# Patient Record
Sex: FEMALE | Race: WHITE | Employment: FULL TIME | URBAN - METROPOLITAN AREA
[De-identification: names, ages, dates, MRNs, and addresses within clinical notes are randomized per-mention and may not be internally consistent; named-entity substitution may affect disease eponyms.]

---

## 2016-06-01 LAB
ANTIBODY SCREEN OB: NEGATIVE
HEMATOCRIT: 42.1
HEMOGLOBIN: 13.9
HEPATITIS B SURFACE ANTIGEN OB: NEGATIVE
PLATELET COUNT: 379
RAPID PLASMA REAGIN OB: NONREACTIVE
RH FACTOR OB: POSITIVE
URINE CULTURE: NEGATIVE

## 2016-08-06 LAB — Lab: NEGATIVE

## 2016-10-18 LAB — Lab: 107

## 2016-12-23 LAB — STREP GP B CULT OB: NEGATIVE

## 2017-01-12 ENCOUNTER — HOSPITAL ENCOUNTER (OUTPATIENT)
Facility: HOSPITAL | Age: 32
Setting detail: OBSERVATION
Discharge: HOME | End: 2017-01-12
Attending: OBSTETRICS & GYNECOLOGY | Admitting: OBSTETRICS & GYNECOLOGY
Payer: COMMERCIAL

## 2017-01-12 PROCEDURE — 59025 FETAL NON-STRESS TEST: CPT

## 2017-01-12 PROCEDURE — 99213 OFFICE O/P EST LOW 20 MIN: CPT

## 2017-01-12 PROCEDURE — 99212 OFFICE O/P EST SF 10 MIN: CPT

## 2017-01-12 RX ORDER — FAMOTIDINE 10 MG
10 TABLET ORAL 2 TIMES DAILY
COMMUNITY
End: 2017-04-18 | Stop reason: ALTCHOICE

## 2017-01-13 VITALS — DIASTOLIC BLOOD PRESSURE: 86 MMHG | SYSTOLIC BLOOD PRESSURE: 127 MMHG | HEART RATE: 86 BPM

## 2017-01-13 NOTE — TRIAGE
[facility]      Triage Note    Rigo Jeffrey Patient Status:  Observation    10/14/1985 MRN [id number]   Location [facility] Attending Elvira Alfaro MD   Hosp Day # 0 PCP Areli Allen.  MD Ashley Bush   Est

## 2017-01-15 ENCOUNTER — ANESTHESIA EVENT (OUTPATIENT)
Dept: OBGYN UNIT | Facility: HOSPITAL | Age: 32
End: 2017-01-15

## 2017-01-15 ENCOUNTER — HOSPITAL ENCOUNTER (INPATIENT)
Facility: HOSPITAL | Age: 32
LOS: 2 days | Discharge: HOME | End: 2017-01-17
Attending: OBSTETRICS & GYNECOLOGY | Admitting: OBSTETRICS & GYNECOLOGY
Payer: COMMERCIAL

## 2017-01-15 ENCOUNTER — ANESTHESIA (OUTPATIENT)
Dept: OBGYN UNIT | Facility: HOSPITAL | Age: 32
End: 2017-01-15

## 2017-01-15 PROBLEM — Z34.90 PREGNANCY: Status: ACTIVE | Noted: 2017-01-15

## 2017-01-15 PROBLEM — Z34.90 PREGNANCY (HCC): Status: ACTIVE | Noted: 2017-01-15

## 2017-01-15 LAB
BILIRUB UR QL: NEGATIVE
COLOR UR: YELLOW
ERYTHROCYTE [DISTWIDTH] IN BLOOD BY AUTOMATED COUNT: 13.1 % (ref 11–15)
GLUCOSE UR-MCNC: NEGATIVE MG/DL
HCT VFR BLD AUTO: 37.8 % (ref 35–48)
HGB BLD-MCNC: 12.9 G/DL (ref 12–16)
KETONES UR-MCNC: NEGATIVE MG/DL
LEUKOCYTE ESTERASE UR QL STRIP.AUTO: NEGATIVE
MCH RBC QN AUTO: 30.3 PG (ref 27–32)
MCHC RBC AUTO-ENTMCNC: 34 G/DL (ref 32–37)
MCV RBC AUTO: 89 FL (ref 80–100)
NITRITE UR QL STRIP.AUTO: NEGATIVE
PH UR: 7 [PH] (ref 5–8)
PLATELET # BLD AUTO: 254 K/UL (ref 140–400)
PMV BLD AUTO: 8.7 FL (ref 7.4–10.3)
PROT UR-MCNC: NEGATIVE MG/DL
RBC # BLD AUTO: 4.25 M/UL (ref 3.7–5.4)
RBC #/AREA URNS AUTO: 1 /HPF
RH BLOOD TYPE: POSITIVE
SP GR UR STRIP: 1.01 (ref 1–1.03)
UROBILINOGEN UR STRIP-ACNC: <2
VIT C UR-MCNC: NEGATIVE MG/DL
WBC # BLD AUTO: 7.7 K/UL (ref 4–11)
WBC #/AREA URNS AUTO: 2 /HPF

## 2017-01-15 PROCEDURE — 51702 INSERT TEMP BLADDER CATH: CPT

## 2017-01-15 PROCEDURE — 85027 COMPLETE CBC AUTOMATED: CPT | Performed by: OBSTETRICS & GYNECOLOGY

## 2017-01-15 PROCEDURE — 86901 BLOOD TYPING SEROLOGIC RH(D): CPT | Performed by: OBSTETRICS & GYNECOLOGY

## 2017-01-15 PROCEDURE — 86900 BLOOD TYPING SEROLOGIC ABO: CPT | Performed by: OBSTETRICS & GYNECOLOGY

## 2017-01-15 PROCEDURE — 0KQM0ZZ REPAIR PERINEUM MUSCLE, OPEN APPROACH: ICD-10-PCS | Performed by: OBSTETRICS & GYNECOLOGY

## 2017-01-15 PROCEDURE — 81001 URINALYSIS AUTO W/SCOPE: CPT | Performed by: OBSTETRICS & GYNECOLOGY

## 2017-01-15 PROCEDURE — 36415 COLL VENOUS BLD VENIPUNCTURE: CPT

## 2017-01-15 PROCEDURE — 86780 TREPONEMA PALLIDUM: CPT | Performed by: OBSTETRICS & GYNECOLOGY

## 2017-01-15 RX ORDER — NALBUPHINE HCL 10 MG/ML
2.5 AMPUL (ML) INJECTION
Status: DISCONTINUED | OUTPATIENT
Start: 2017-01-15 | End: 2017-01-17

## 2017-01-15 RX ORDER — SODIUM CHLORIDE, SODIUM LACTATE, POTASSIUM CHLORIDE, CALCIUM CHLORIDE 600; 310; 30; 20 MG/100ML; MG/100ML; MG/100ML; MG/100ML
INJECTION, SOLUTION INTRAVENOUS
Status: COMPLETED
Start: 2017-01-15 | End: 2017-01-15

## 2017-01-15 RX ORDER — MORPHINE SULFATE 4 MG/ML
4 INJECTION, SOLUTION INTRAMUSCULAR; INTRAVENOUS EVERY 10 MIN PRN
Status: DISCONTINUED | OUTPATIENT
Start: 2017-01-15 | End: 2017-01-17

## 2017-01-15 RX ORDER — LIDOCAINE HYDROCHLORIDE 10 MG/ML
INJECTION, SOLUTION EPIDURAL; INFILTRATION; INTRACAUDAL; PERINEURAL AS NEEDED
Status: DISCONTINUED | OUTPATIENT
Start: 2017-01-15 | End: 2018-09-10 | Stop reason: SURG

## 2017-01-15 RX ORDER — TERBUTALINE SULFATE 1 MG/ML
0.25 INJECTION, SOLUTION SUBCUTANEOUS AS NEEDED
Status: DISCONTINUED | OUTPATIENT
Start: 2017-01-15 | End: 2017-01-15

## 2017-01-15 RX ORDER — SODIUM CHLORIDE 0.9 % (FLUSH) 0.9 %
10 SYRINGE (ML) INJECTION AS NEEDED
Status: DISCONTINUED | OUTPATIENT
Start: 2017-01-15 | End: 2017-01-17

## 2017-01-15 RX ORDER — MORPHINE SULFATE 10 MG/ML
6 INJECTION, SOLUTION INTRAMUSCULAR; INTRAVENOUS EVERY 10 MIN PRN
Status: DISCONTINUED | OUTPATIENT
Start: 2017-01-15 | End: 2017-01-17

## 2017-01-15 RX ORDER — ONDANSETRON 2 MG/ML
4 INJECTION INTRAMUSCULAR; INTRAVENOUS ONCE AS NEEDED
Status: ACTIVE | OUTPATIENT
Start: 2017-01-15 | End: 2017-01-15

## 2017-01-15 RX ORDER — SODIUM CHLORIDE, SODIUM LACTATE, POTASSIUM CHLORIDE, CALCIUM CHLORIDE 600; 310; 30; 20 MG/100ML; MG/100ML; MG/100ML; MG/100ML
INJECTION, SOLUTION INTRAVENOUS
Status: DISPENSED
Start: 2017-01-15 | End: 2017-01-15

## 2017-01-15 RX ORDER — AMMONIA INHALANTS 0.04 G/.3ML
0.3 INHALANT RESPIRATORY (INHALATION) AS NEEDED
Status: DISCONTINUED | OUTPATIENT
Start: 2017-01-15 | End: 2017-01-15

## 2017-01-15 RX ORDER — ONDANSETRON 2 MG/ML
4 INJECTION INTRAMUSCULAR; INTRAVENOUS EVERY 6 HOURS PRN
Status: DISCONTINUED | OUTPATIENT
Start: 2017-01-15 | End: 2017-01-17

## 2017-01-15 RX ORDER — ONDANSETRON 2 MG/ML
4 INJECTION INTRAMUSCULAR; INTRAVENOUS EVERY 6 HOURS PRN
Status: DISCONTINUED | OUTPATIENT
Start: 2017-01-15 | End: 2017-01-15

## 2017-01-15 RX ORDER — LIDOCAINE HYDROCHLORIDE AND EPINEPHRINE 15; 5 MG/ML; UG/ML
INJECTION, SOLUTION EPIDURAL AS NEEDED
Status: DISCONTINUED | OUTPATIENT
Start: 2017-01-15 | End: 2018-09-10 | Stop reason: SURG

## 2017-01-15 RX ORDER — HYDROMORPHONE HYDROCHLORIDE 1 MG/ML
0.2 INJECTION, SOLUTION INTRAMUSCULAR; INTRAVENOUS; SUBCUTANEOUS EVERY 5 MIN PRN
Status: DISCONTINUED | OUTPATIENT
Start: 2017-01-15 | End: 2017-01-17

## 2017-01-15 RX ORDER — MORPHINE SULFATE 2 MG/ML
2 INJECTION, SOLUTION INTRAMUSCULAR; INTRAVENOUS EVERY 10 MIN PRN
Status: DISCONTINUED | OUTPATIENT
Start: 2017-01-15 | End: 2017-01-17

## 2017-01-15 RX ORDER — ACETAMINOPHEN 325 MG/1
650 TABLET ORAL EVERY 4 HOURS PRN
Status: DISCONTINUED | OUTPATIENT
Start: 2017-01-15 | End: 2017-01-17

## 2017-01-15 RX ORDER — HYDROCODONE BITARTRATE AND ACETAMINOPHEN 5; 325 MG/1; MG/1
1 TABLET ORAL EVERY 4 HOURS PRN
Status: DISCONTINUED | OUTPATIENT
Start: 2017-01-15 | End: 2017-01-17

## 2017-01-15 RX ORDER — NALOXONE HYDROCHLORIDE 0.4 MG/ML
80 INJECTION, SOLUTION INTRAMUSCULAR; INTRAVENOUS; SUBCUTANEOUS AS NEEDED
Status: ACTIVE | OUTPATIENT
Start: 2017-01-15 | End: 2017-01-15

## 2017-01-15 RX ORDER — DOCUSATE SODIUM 100 MG/1
100 CAPSULE, LIQUID FILLED ORAL
Status: DISCONTINUED | OUTPATIENT
Start: 2017-01-15 | End: 2017-01-17

## 2017-01-15 RX ORDER — FAMOTIDINE 20 MG/1
10 TABLET ORAL 2 TIMES DAILY
Status: DISCONTINUED | OUTPATIENT
Start: 2017-01-15 | End: 2017-01-17

## 2017-01-15 RX ORDER — EPHEDRINE SULFATE 50 MG/ML
5 INJECTION, SOLUTION INTRAVENOUS AS NEEDED
Status: DISCONTINUED | OUTPATIENT
Start: 2017-01-15 | End: 2017-01-15

## 2017-01-15 RX ORDER — DEXTROSE, SODIUM CHLORIDE, SODIUM LACTATE, POTASSIUM CHLORIDE, AND CALCIUM CHLORIDE 5; .6; .31; .03; .02 G/100ML; G/100ML; G/100ML; G/100ML; G/100ML
125 INJECTION, SOLUTION INTRAVENOUS CONTINUOUS
Status: DISCONTINUED | OUTPATIENT
Start: 2017-01-15 | End: 2017-01-15

## 2017-01-15 RX ORDER — SIMETHICONE 80 MG
80 TABLET,CHEWABLE ORAL 3 TIMES DAILY PRN
Status: DISCONTINUED | OUTPATIENT
Start: 2017-01-15 | End: 2017-01-17

## 2017-01-15 RX ORDER — AMMONIA INHALANTS 0.04 G/.3ML
0.3 INHALANT RESPIRATORY (INHALATION) AS NEEDED
Status: DISCONTINUED | OUTPATIENT
Start: 2017-01-15 | End: 2017-01-17

## 2017-01-15 RX ORDER — HYDROMORPHONE HYDROCHLORIDE 1 MG/ML
0.6 INJECTION, SOLUTION INTRAMUSCULAR; INTRAVENOUS; SUBCUTANEOUS EVERY 5 MIN PRN
Status: DISCONTINUED | OUTPATIENT
Start: 2017-01-15 | End: 2017-01-17

## 2017-01-15 RX ORDER — IBUPROFEN 600 MG/1
600 TABLET ORAL EVERY 6 HOURS
Status: DISCONTINUED | OUTPATIENT
Start: 2017-01-15 | End: 2017-01-17

## 2017-01-15 RX ORDER — IBUPROFEN 600 MG/1
600 TABLET ORAL ONCE AS NEEDED
Status: DISCONTINUED | OUTPATIENT
Start: 2017-01-15 | End: 2017-01-15

## 2017-01-15 RX ORDER — HALOPERIDOL 5 MG/ML
0.25 INJECTION INTRAMUSCULAR ONCE AS NEEDED
Status: ACTIVE | OUTPATIENT
Start: 2017-01-15 | End: 2017-01-15

## 2017-01-15 RX ORDER — HYDROCODONE BITARTRATE AND ACETAMINOPHEN 5; 325 MG/1; MG/1
2 TABLET ORAL EVERY 4 HOURS PRN
Status: DISCONTINUED | OUTPATIENT
Start: 2017-01-15 | End: 2017-01-17

## 2017-01-15 RX ORDER — BUPIVACAINE HYDROCHLORIDE 2.5 MG/ML
INJECTION, SOLUTION EPIDURAL; INFILTRATION; INTRACAUDAL
Status: DISCONTINUED
Start: 2017-01-15 | End: 2017-01-15 | Stop reason: WASHOUT

## 2017-01-15 RX ORDER — HYDROCODONE BITARTRATE AND ACETAMINOPHEN 5; 325 MG/1; MG/1
1 TABLET ORAL AS NEEDED
Status: DISCONTINUED | OUTPATIENT
Start: 2017-01-15 | End: 2017-01-17

## 2017-01-15 RX ORDER — LIDOCAINE HYDROCHLORIDE 10 MG/ML
30 INJECTION, SOLUTION EPIDURAL; INFILTRATION; INTRACAUDAL; PERINEURAL ONCE
Status: DISCONTINUED | OUTPATIENT
Start: 2017-01-15 | End: 2017-01-15

## 2017-01-15 RX ORDER — HYDROMORPHONE HYDROCHLORIDE 1 MG/ML
0.4 INJECTION, SOLUTION INTRAMUSCULAR; INTRAVENOUS; SUBCUTANEOUS EVERY 5 MIN PRN
Status: DISCONTINUED | OUTPATIENT
Start: 2017-01-15 | End: 2017-01-17

## 2017-01-15 RX ORDER — HYDROCODONE BITARTRATE AND ACETAMINOPHEN 5; 325 MG/1; MG/1
2 TABLET ORAL AS NEEDED
Status: DISCONTINUED | OUTPATIENT
Start: 2017-01-15 | End: 2017-01-17

## 2017-01-15 RX ORDER — BISACODYL 10 MG
10 SUPPOSITORY, RECTAL RECTAL ONCE AS NEEDED
Status: ACTIVE | OUTPATIENT
Start: 2017-01-15 | End: 2017-01-15

## 2017-01-15 RX ORDER — SODIUM CHLORIDE 0.9 % (FLUSH) 0.9 %
10 SYRINGE (ML) INJECTION AS NEEDED
Status: DISCONTINUED | OUTPATIENT
Start: 2017-01-15 | End: 2017-01-15

## 2017-01-15 RX ADMIN — LIDOCAINE HYDROCHLORIDE AND EPINEPHRINE 5 ML: 15; 5 INJECTION, SOLUTION EPIDURAL at 03:45:00

## 2017-01-15 RX ADMIN — LIDOCAINE HYDROCHLORIDE 5 ML: 10 INJECTION, SOLUTION EPIDURAL; INFILTRATION; INTRACAUDAL; PERINEURAL at 03:45:00

## 2017-01-15 NOTE — ANESTHESIA PROCEDURE NOTES
Labor Analgesia  Performed by: Marla Saxena by: Felix Jimenez    Start Time:  1/15/2017 3:45 AM  End Time:  1/15/2017 4:02 AM  Site Identification: surface landmarks    Reason for Block: labor epidural    Anesthesiologist:  Felix Jimenez  Performed

## 2017-01-15 NOTE — H&P
[address] Jing Cardenas Patient Status:  Observation    10/14/1985 MRN [id number]   Location [address] C-D Attending Tessa, [address] [place] [address] Day # 0 PCP Roberta Masterson.  Zenobia Lacy MD     Date of Admission admission:  famotidine 10 MG Oral Tab Take 10 mg by mouth 2 (two) times daily. Disp:  Rfl:  1/14/2017 at Unknown time   Prenatal MV-Min-Fe Fum-FA-DHA (PRENATAL+DHA) 28-0.975 & 200 MG Oral Misc Take  by mouth.  Disp:  Rfl:  1/14/2017 at Unknown time     Delfino

## 2017-01-15 NOTE — LACTATION NOTE
This note was copied from the chart of [address] [place] [address].   LACTATION NOTE - INFANT    Evaluation Type  Evaluation Type: Inpatient    Problems & Assessment  Problems Diagnosed or Identified: Sleepy  Infant Assessment: Skin color: pink or appropriate for ethnicity;

## 2017-01-15 NOTE — ANESTHESIA PREPROCEDURE EVALUATION
Anesthesia PreOp Note    HPI:     Michelle August is a 33year old female who presents for preoperative consultation requested by: * No surgeons listed *    Date of Surgery: 1/15/2017    * No procedures listed *  Indication: * No pre-op diagnosis entered * (MARCAINE) 0.25 % injection         fentaNYL 2mcg/ml & bupivacaine 1.25mg/ml 2 mcg/ml-0.125% epidural infusion         lactated ringers infusion         Lidocaine HCl (PF) (XYLOCAINE) 1 % injection Annita Darling MD 5 mL at 01/15/17 0345    lidoca EPHEDrine Sulfate injection 5 mg 5 mg Intravenous PRN Annita Hubbard MD     Nalbuphine HCl (NUBAIN) injection 2.5 mg 2.5 mg Intravenous Q15 Min PRN Annita Hubbard MD     Naloxone HCl (NARCAN) 0.4 MG/ML injection 80 mcg 80 mcg Intravenous PRN Ross Mauricio MD is 79. Her respiration is 16 and oxygen saturation is 97%.     01/15/17  0220 01/15/17  0230 01/15/17  0310   BP: 152/82 154/80 133/79   Pulse: 56 61 79   Temp: 98 °F (36.7 °C)     TempSrc: Oral     Resp: 16     Height: 1.626 m (5' 4\")     Weight: 87.998 k

## 2017-01-15 NOTE — LACTATION NOTE
LACTATION NOTE - MOTHER           Problems identified  Problems identified: Knowledge deficit         Breastfeeding goal  Breastfeeding goal: To maintain breast milk feeding per patient goal    Maternal Assessment  Bilateral Breasts: Soft  Bilateral Nipple

## 2017-01-15 NOTE — L&D DELIVERY NOTE
[facility]    Vaginal Delivery Note    [facility] Patient Status:  Observation    10/14/1985 MRN [id number]   Location [address] C-D Attending Tessa, [address] [place] [address] Day # 0 PCP Leora Luna.  Adelina Paredes MD     Delivery     I

## 2017-01-15 NOTE — DISCHARGE SUMMARY
[facility]    Discharge Summary    Raisa Cam Patient Status:  Observation    10/14/1985 MRN [id number]   Location [address] C-D Attending Tessa, [address] [place] [address] Street Day # 0       Admission Date: 1/15/2017  Discharge

## 2017-01-16 LAB
HCT VFR BLD AUTO: 35.5 % (ref 35–48)
HGB BLD-MCNC: 12.1 G/DL (ref 12–16)
T PALLIDUM AB SER QL: NEGATIVE

## 2017-01-16 PROCEDURE — 85014 HEMATOCRIT: CPT | Performed by: OBSTETRICS & GYNECOLOGY

## 2017-01-16 PROCEDURE — 85018 HEMOGLOBIN: CPT | Performed by: OBSTETRICS & GYNECOLOGY

## 2017-01-16 NOTE — PLAN OF CARE
Problem: POSTPARTUM  Goal: Optimize infant feeding at the breast  INTERVENTIONS:  - Initiate breast feeding within first hour after birth. - Monitor effectiveness of current breast feeding efforts. - Assess support systems available to mother/family.   - beliefs/practices regarding lactation, letdown techniques, maternal food preferences.   - Assess mother’s knowledge and previous experience with breast feeding.  - Provide information as needed about early infant feeding cues (e.g., rooting, lip smacking, s

## 2017-01-16 NOTE — LACTATION NOTE
LACTATION NOTE - MOTHER           Problems identified  Problems identified: Knowledge deficit (hx of plugged ducts)         Breastfeeding goal  Breastfeeding goal: To maintain breast milk feeding per patient goal    Maternal Assessment  Bilateral Breasts:

## 2017-01-16 NOTE — PROGRESS NOTES
[facility]    OB/GYNE Progress Note      Joo Taveras Patient Status:  Inpatient    10/14/1985 MRN [id number]   Location [facility] 3SE Attending Tessa, [address] [place] [address] Day # 1 PCP Arnulfo Amos.  Luba Ken MD       Assessment/Plan

## 2017-01-17 VITALS
WEIGHT: 194 LBS | RESPIRATION RATE: 16 BRPM | HEIGHT: 64 IN | TEMPERATURE: 98 F | OXYGEN SATURATION: 97 % | HEART RATE: 86 BPM | SYSTOLIC BLOOD PRESSURE: 126 MMHG | BODY MASS INDEX: 33.12 KG/M2 | DIASTOLIC BLOOD PRESSURE: 83 MMHG

## 2017-01-17 NOTE — LACTATION NOTE
LACTATION NOTE - MOTHER           Problems identified  Problems identified: Knowledge deficit; Nipple pain  Problems Identified Other: Reports cluster feeding overnight    Maternal history  Other/comment: plugged ducts, engorgement with first    Breastfeedi

## 2017-01-17 NOTE — PLAN OF CARE
BREAST FEEDING    • Optimize infant feeding at the breast Completed        INADEQUATE LATCH, SUCK OR SWALLOW    • Demonstrate ability to latch and sustain latch, audible swallowing and satiety Completed        POSTPARTUM    • Long Term Goal:Experiences nor

## 2017-01-17 NOTE — LACTATION NOTE
This note was copied from the chart of [address] [place] [address].   LACTATION NOTE - INFANT    Evaluation Type  Evaluation Type: Inpatient    Problems & Assessment  Problems Diagnosed or Identified: Tongue restriction  Problems: comment/detail: Visible lingual frenulum an goal: To provide adequate infant nutrition to prevent initial  weight loss of greater than 10% and thereafter to meet or exceed minimum weight gain of 4-7 oz per week  while maintaining breast milk feeding.   Written Education: Outpatient lactation c

## 2017-01-17 NOTE — PROGRESS NOTES
Michelle August  [id number]  January 17, 2017  7:19 AM      Post-Partum Vaginal Delivery    Subjective: Doing well, no complaints    Objective:  Tolerating present diet, pain well controlled, ambulating  /78 mmHg  Pulse 81  Temp(Src) 98.1 °F (36.7 °C) (Oral

## 2017-01-17 NOTE — PLAN OF CARE
Dr Anisha King notified of EPDS 10. Behavior Health notified of score. Requested that RN give Mom line to patient prior to discharge. Patient acting appropriate and given additional resources.

## 2017-01-25 ENCOUNTER — NURSE ONLY (OUTPATIENT)
Dept: LACTATION | Facility: HOSPITAL | Age: 32
End: 2017-01-25
Payer: COMMERCIAL

## 2017-01-25 DIAGNOSIS — O92.79 DISORDER OF LACTATION, POSTPARTUM CONDITION OR COMPLICATION: Primary | ICD-10-CM

## 2017-01-25 PROCEDURE — 99212 OFFICE O/P EST SF 10 MIN: CPT

## 2017-01-25 NOTE — PROGRESS NOTES
Mother here today because of ongoing nipple pain while nursing infant. Infant is gaining weight appropriately. Mom has oversupply of milk.  Upon observation of the feeding, infant unable to hilda upper lip to make a good suction on the breast due to restric positioning to make her back more comfortable with nursing.

## 2017-04-04 ENCOUNTER — TELEPHONE (OUTPATIENT)
Dept: FAMILY MEDICINE CLINIC | Facility: CLINIC | Age: 32
End: 2017-04-04

## 2017-04-04 NOTE — TELEPHONE ENCOUNTER
She's curious if there are any suggestions for energy levels, natural remedies. She's feeling run down after having her child in January and he's sleeping well now so she wants to speak with a nurse.  She's been taking the prenatal vitamin and limiting caff

## 2017-04-04 NOTE — TELEPHONE ENCOUNTER
Pt states she has a 1month old at home, this is her second child, she has a toddler as well. Pt states there are times she feels like she is completley out of energy. Pt is breast feeding, taking prenatal vitamin and limiting caffeine.  Pt is trying to eat

## 2017-04-18 ENCOUNTER — OFFICE VISIT (OUTPATIENT)
Dept: FAMILY MEDICINE CLINIC | Facility: CLINIC | Age: 32
End: 2017-04-18

## 2017-04-18 VITALS
HEIGHT: 65.16 IN | SYSTOLIC BLOOD PRESSURE: 114 MMHG | TEMPERATURE: 98 F | DIASTOLIC BLOOD PRESSURE: 80 MMHG | RESPIRATION RATE: 18 BRPM | WEIGHT: 159 LBS | BODY MASS INDEX: 26.17 KG/M2 | HEART RATE: 87 BPM

## 2017-04-18 DIAGNOSIS — R53.83 OTHER FATIGUE: Primary | ICD-10-CM

## 2017-04-18 PROCEDURE — 99214 OFFICE O/P EST MOD 30 MIN: CPT | Performed by: FAMILY MEDICINE

## 2017-04-18 PROCEDURE — 99212 OFFICE O/P EST SF 10 MIN: CPT | Performed by: FAMILY MEDICINE

## 2017-04-19 NOTE — PROGRESS NOTES
HPI:    Patient ID: Annia Harris is a 33year old female. Malaise  Associated symptoms include fatigue. Pertinent negatives include no fever, headaches, joint swelling, nausea or rash. Nothing aggravates the symptoms. She has tried rest for the symptoms. decrease commitments. If no improvement consider more extensive exam/testing.       Orders Placed This Encounter  BASIC METABOLIC PANEL [04229] [Q]  CBC [6399] [Q]  TSH [899] [Q]    Meds This Visit:  No prescriptions requested or ordered in this encounter

## 2017-04-26 ENCOUNTER — OFFICE VISIT (OUTPATIENT)
Dept: FAMILY MEDICINE CLINIC | Facility: CLINIC | Age: 32
End: 2017-04-26

## 2017-04-26 VITALS
WEIGHT: 157 LBS | DIASTOLIC BLOOD PRESSURE: 78 MMHG | HEIGHT: 65.5 IN | BODY MASS INDEX: 25.84 KG/M2 | RESPIRATION RATE: 16 BRPM | TEMPERATURE: 97 F | SYSTOLIC BLOOD PRESSURE: 113 MMHG | HEART RATE: 80 BPM

## 2017-04-26 DIAGNOSIS — J01.00 ACUTE NON-RECURRENT MAXILLARY SINUSITIS: Primary | ICD-10-CM

## 2017-04-26 PROCEDURE — 99212 OFFICE O/P EST SF 10 MIN: CPT | Performed by: FAMILY MEDICINE

## 2017-04-26 PROCEDURE — 99213 OFFICE O/P EST LOW 20 MIN: CPT | Performed by: FAMILY MEDICINE

## 2017-04-26 RX ORDER — AMOXICILLIN AND CLAVULANATE POTASSIUM 875; 125 MG/1; MG/1
1 TABLET, FILM COATED ORAL 2 TIMES DAILY
Qty: 20 TABLET | Refills: 0 | Status: SHIPPED | OUTPATIENT
Start: 2017-04-26 | End: 2017-05-06

## 2017-04-27 NOTE — PROGRESS NOTES
HPI:    Giovanna Haddad is a 33year old female presents to clinic with a almost  3 week history of symptoms. States that she initially felt like she had a cold over Easter weekend.  Symptoms were severe for about 4-5 days and then they improved but did not r HENT: Positive for congestion and ear pain. Negative for sore throat. Eyes: Negative. Respiratory: Positive for cough. Negative for hemoptysis, sputum production, shortness of breath and wheezing. Cardiovascular: Negative.     Gastrointestinal: N defined types were placed in this encounter. Meds This Visit:    Signed Prescriptions Disp Refills    Amoxicillin-Pot Clavulanate 875-125 MG Oral Tab 20 tablet 0      Sig: Take 1 tablet by mouth 2 (two) times daily.            Imaging & Referrals:  No

## 2017-08-08 ENCOUNTER — OFFICE VISIT (OUTPATIENT)
Dept: FAMILY MEDICINE CLINIC | Facility: CLINIC | Age: 32
End: 2017-08-08

## 2017-08-08 VITALS
SYSTOLIC BLOOD PRESSURE: 116 MMHG | BODY MASS INDEX: 26.34 KG/M2 | HEIGHT: 64.57 IN | WEIGHT: 156.19 LBS | DIASTOLIC BLOOD PRESSURE: 77 MMHG | RESPIRATION RATE: 17 BRPM | TEMPERATURE: 98 F | HEART RATE: 73 BPM

## 2017-08-08 DIAGNOSIS — R20.0 NUMBNESS OF TOES: ICD-10-CM

## 2017-08-08 DIAGNOSIS — D22.9 NEVUS: ICD-10-CM

## 2017-08-08 DIAGNOSIS — E66.3 OVERWEIGHT: ICD-10-CM

## 2017-08-08 DIAGNOSIS — Z01.419 ENCOUNTER FOR GYNECOLOGICAL EXAMINATION WITHOUT ABNORMAL FINDING: Primary | ICD-10-CM

## 2017-08-08 DIAGNOSIS — F41.9 ANXIETY: ICD-10-CM

## 2017-08-08 PROCEDURE — 99395 PREV VISIT EST AGE 18-39: CPT | Performed by: FAMILY MEDICINE

## 2017-08-08 NOTE — PROGRESS NOTES
HPI:    Patient ID: Mayela Mendez is a 33year old female. HPI    Review of Systems   Constitutional: Negative. Respiratory: Negative. Cardiovascular: Negative. Gastrointestinal: Negative. Skin: Negative.     Neurological: Positive for numbness anxiety has been helpful    Overweight–reviewed diet and exercise recommendations    Lactating mother–currently breast-feeding    Nevus–right groin/buttock with 5 mm flat pigmented nevus, discussed monitoring for change    Numbness of toes–patient has inte

## 2017-08-10 LAB — HPV I/H RISK 1 DNA SPEC QL NAA+PROBE: NEGATIVE

## 2017-08-28 ENCOUNTER — NURSE TRIAGE (OUTPATIENT)
Dept: FAMILY MEDICINE CLINIC | Facility: CLINIC | Age: 32
End: 2017-08-28

## 2017-08-28 NOTE — TELEPHONE ENCOUNTER
Action Requested: Summary for Provider     []  Critical Lab, Recommendations Needed  [] Need Additional Advice  []   FYI    []   Need Orders  [] Need Medications Sent to Pharmacy  []  Other     SUMMARY: Carlo Clarke today or tomorrow but pt can only go to OP

## 2017-08-29 ENCOUNTER — OFFICE VISIT (OUTPATIENT)
Dept: FAMILY MEDICINE CLINIC | Facility: CLINIC | Age: 32
End: 2017-08-29

## 2017-08-29 VITALS
SYSTOLIC BLOOD PRESSURE: 114 MMHG | TEMPERATURE: 98 F | DIASTOLIC BLOOD PRESSURE: 77 MMHG | BODY MASS INDEX: 26.25 KG/M2 | RESPIRATION RATE: 16 BRPM | WEIGHT: 155.63 LBS | HEIGHT: 64.57 IN | HEART RATE: 77 BPM
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
## 2017-08-29 PROCEDURE — 99213 OFFICE O/P EST LOW 20 MIN: CPT | Performed by: FAMILY MEDICINE

## 2017-08-29 PROCEDURE — 99212 OFFICE O/P EST SF 10 MIN: CPT | Performed by: FAMILY MEDICINE

## 2017-08-29 RX ORDER — FLUTICASONE PROPIONATE 50 MCG
SPRAY, SUSPENSION (ML) NASAL DAILY
COMMUNITY
End: 2019-12-18

## 2017-08-29 RX ORDER — CHOLECALCIFEROL (VITAMIN D3) 50 MCG
TABLET ORAL
COMMUNITY
End: 2019-02-20

## 2017-08-29 NOTE — PROGRESS NOTES
HPI:    Patient ID: Jennifer Davis is a 33year old female. Rash   This is a new problem. The current episode started yesterday. The problem has been waxing and waning since onset. The affected locations include the left lower leg and right lower leg.  The

## 2017-08-29 NOTE — TELEPHONE ENCOUNTER
Spoke with patient and relayed Dr. Olga Lidia Foley message below-patient verbalizes understanding and agreement. Patient added on to schedule tonight.

## 2017-09-13 ENCOUNTER — NURSE TRIAGE (OUTPATIENT)
Dept: OTHER | Age: 32
End: 2017-09-13

## 2017-09-13 NOTE — TELEPHONE ENCOUNTER
Action Requested: Summary for Provider     []  Critical Lab, Recommendations Needed  [] Need Additional Advice  [x]   FYI    []   Need Orders  [] Need Medications Sent to Pharmacy  []  Other     SUMMARY:Home Care instructions provided.     Pt states that sh

## 2017-09-14 ENCOUNTER — TELEPHONE (OUTPATIENT)
Dept: FAMILY MEDICINE CLINIC | Facility: CLINIC | Age: 32
End: 2017-09-14

## 2017-09-14 NOTE — TELEPHONE ENCOUNTER
Pt states she was seen in at an  for an infection of her eye lid.  Pt was given prescription for Erythromycin eye ointment, pharmacist was not able to give pt information regarding safety of med while breast feeding and pt only found information about ora

## 2017-09-14 NOTE — TELEPHONE ENCOUNTER
Pt called back and stated that she feels her eye is more swollen and the eye is tearing. Pt was offered a appt but she can not make it in until after 5 pm. Pt was advised that she needs to be seen today.  Pt was advised to go to a walk in clinic or IC as sh

## 2017-09-14 NOTE — TELEPHONE ENCOUNTER
Pt returned call, informed her of Dr. Claudia Solis message.  She has no further questions or concerns

## 2017-09-14 NOTE — TELEPHONE ENCOUNTER
Pt calling and stts she is breast feeding and was given the med below to use on her eye. Pt wants to make sure it would be safe?   Please advise    Medication Quantity Refills Start End   triamcinolone acetonide 0.1 % External Cream 15 g 0 8/29/2017 8/29/20

## 2017-09-15 NOTE — TELEPHONE ENCOUNTER
Pt returning call, states she went to  in [facility], was prescribed pt eye ointment and was told her symptoms should improve in a couple days. Advised pt to call back if symptoms worsen. Patient verbalized understanding and had no further questions.

## 2017-10-20 ENCOUNTER — TELEPHONE (OUTPATIENT)
Dept: OTHER | Age: 32
End: 2017-10-20

## 2017-10-20 NOTE — TELEPHONE ENCOUNTER
LM for patient with information on Get out of your Mind and into your Life by Vaishnavi Cano, PhD.  Also contact information for Dr. Adis Polk.

## 2017-10-20 NOTE — TELEPHONE ENCOUNTER
Patient calling and stated she was diagnosed with Anxiety a couple of years ago. He family does have stressful situations. She stated she if fine now but would like to keep her anxiety under control.   She is asking if you know of any books she can read

## 2017-11-02 ENCOUNTER — NURSE TRIAGE (OUTPATIENT)
Dept: OTHER | Age: 32
End: 2017-11-02

## 2017-11-02 RX ORDER — CODEINE PHOSPHATE AND GUAIFENESIN 10; 100 MG/5ML; MG/5ML
5 SOLUTION ORAL EVERY 6 HOURS PRN
Qty: 180 ML | Refills: 0 | Status: SHIPPED | OUTPATIENT
Start: 2017-11-02 | End: 2017-11-16

## 2017-11-02 NOTE — TELEPHONE ENCOUNTER
Please reply to pool: EM RN TRIAGE      Action Requested: Summary for Provider     []  Critical Lab, Recommendations Needed  [x] Need Additional Advice  []   FYI    [x]   Need Orders  [x] Need Medications Sent to Pharmacy  []  Other     SUMMARY: Requesting

## 2017-11-02 NOTE — TELEPHONE ENCOUNTER
Left message for patient–will send Cheratussin to pharmacy. However agree with recommendation for follow-up visit, no additional antibiotic without evaluation.

## 2017-11-02 NOTE — TELEPHONE ENCOUNTER
Dr German Connors, Patient calling to ask if she can take Cheratussin if she is breast feeding her 10 month old, LMP about two weeks ago in October

## 2017-11-03 NOTE — TELEPHONE ENCOUNTER
Please let her know it is okay to use after infant is 3months of age. Occasionally may cause sedation and infant, but not dangerous levels.

## 2017-11-16 ENCOUNTER — OFFICE VISIT (OUTPATIENT)
Dept: FAMILY MEDICINE CLINIC | Facility: CLINIC | Age: 32
End: 2017-11-16

## 2017-11-16 ENCOUNTER — TELEPHONE (OUTPATIENT)
Dept: OTHER | Age: 32
End: 2017-11-16

## 2017-11-16 VITALS
HEART RATE: 84 BPM | TEMPERATURE: 98 F | RESPIRATION RATE: 16 BRPM | DIASTOLIC BLOOD PRESSURE: 83 MMHG | BODY MASS INDEX: 27 KG/M2 | SYSTOLIC BLOOD PRESSURE: 126 MMHG | WEIGHT: 161 LBS

## 2017-11-16 DIAGNOSIS — J02.9 PHARYNGITIS, UNSPECIFIED ETIOLOGY: Primary | ICD-10-CM

## 2017-11-16 PROCEDURE — 99212 OFFICE O/P EST SF 10 MIN: CPT | Performed by: FAMILY MEDICINE

## 2017-11-16 PROCEDURE — 99214 OFFICE O/P EST MOD 30 MIN: CPT | Performed by: FAMILY MEDICINE

## 2017-11-16 PROCEDURE — 87880 STREP A ASSAY W/OPTIC: CPT | Performed by: FAMILY MEDICINE

## 2017-11-16 NOTE — TELEPHONE ENCOUNTER
Patient called and states that she still has bilateral ear pain that is getting worse, sore throat, no fever, no more cough, presently at work, offered appointment but only available after 4 PM in [facility] ,agrees to see other provider,contacted

## 2017-11-16 NOTE — PROGRESS NOTES
HPI: Marilia Grullon is a 27year old female who presents for sore throat. Pt reports she had ear pain and sore throat few weeks ago. Treated with Amoxicillin. Had stomach flu the following week.   States throat has been sore in the morning and night throughout all unspecified etiology  (primary encounter diagnosis)     Rapid strep negative. Will send throat culture. Will do CBC and mono as well as pt has severe pain and swollen lymph nodes. Advised Tylenol or Motrin for pain, cool liquids, soft foods.    Pt agreed

## 2017-11-17 ENCOUNTER — LAB ENCOUNTER (OUTPATIENT)
Dept: LAB | Age: 32
End: 2017-11-17
Attending: FAMILY MEDICINE
Payer: COMMERCIAL

## 2017-11-17 DIAGNOSIS — J02.9 PHARYNGITIS, UNSPECIFIED ETIOLOGY: ICD-10-CM

## 2017-11-17 PROCEDURE — 86665 EPSTEIN-BARR CAPSID VCA: CPT

## 2017-11-17 PROCEDURE — 86664 EPSTEIN-BARR NUCLEAR ANTIGEN: CPT

## 2017-11-17 PROCEDURE — 36415 COLL VENOUS BLD VENIPUNCTURE: CPT

## 2017-11-17 PROCEDURE — 85025 COMPLETE CBC W/AUTO DIFF WBC: CPT

## 2017-11-20 ENCOUNTER — TELEPHONE (OUTPATIENT)
Dept: FAMILY MEDICINE CLINIC | Facility: CLINIC | Age: 32
End: 2017-11-20

## 2017-11-20 NOTE — TELEPHONE ENCOUNTER
Dr. Bridgett Robb for Dr. Janusz Euceda pt is calling for her mono test results. Pt stated that she got the result for the strep throat through mychart but not for the Mono. Ok to inform that she does not have mono? She wants to kiss her kids.

## 2018-01-09 ENCOUNTER — NURSE TRIAGE (OUTPATIENT)
Dept: OTHER | Age: 33
End: 2018-01-09

## 2018-01-09 NOTE — TELEPHONE ENCOUNTER
Action Requested: Summary for Provider     []  Critical Lab, Recommendations Needed  [x] Need Additional Advice  []   FYI    []   Need Orders  [] Need Medications Sent to Pharmacy  []  Other     SUMMARY: Asking if [facility] has any further recommendations for

## 2018-01-09 NOTE — TELEPHONE ENCOUNTER
Called patient - verified patient's name and  - informed pt of doctor's note - patient verbalized understanding and intent to comply

## 2018-01-09 NOTE — TELEPHONE ENCOUNTER
Agree with advice given. Also recommend adding Metamucil or Citrucel 1 teaspoon daily, continue this long-term to prevent future problems.   If current episode is not resolved in 2 weeks with advice given recommend appointment for evaluation

## 2018-01-16 NOTE — TELEPHONE ENCOUNTER
Patient called and states that her hemorrhoid issue is getting worse, feels like burning, stinging, pain for 1/2 hr afterwards, with slight bleeding when wiping it,advised to use witch hazel pad, states that it is not helping her, requesting MD appointment

## 2018-01-17 ENCOUNTER — OFFICE VISIT (OUTPATIENT)
Dept: FAMILY MEDICINE CLINIC | Facility: CLINIC | Age: 33
End: 2018-01-17

## 2018-01-17 ENCOUNTER — TELEPHONE (OUTPATIENT)
Dept: FAMILY MEDICINE CLINIC | Facility: CLINIC | Age: 33
End: 2018-01-17

## 2018-01-17 VITALS
HEART RATE: 75 BPM | WEIGHT: 165.81 LBS | SYSTOLIC BLOOD PRESSURE: 123 MMHG | BODY MASS INDEX: 27.96 KG/M2 | TEMPERATURE: 98 F | DIASTOLIC BLOOD PRESSURE: 82 MMHG | HEIGHT: 64.57 IN | RESPIRATION RATE: 17 BRPM

## 2018-01-17 DIAGNOSIS — L30.4 INTERTRIGO: ICD-10-CM

## 2018-01-17 DIAGNOSIS — K64.4 EXTERNAL HEMORRHOID, BLEEDING: Primary | ICD-10-CM

## 2018-01-17 PROCEDURE — 99212 OFFICE O/P EST SF 10 MIN: CPT | Performed by: FAMILY MEDICINE

## 2018-01-17 PROCEDURE — 99213 OFFICE O/P EST LOW 20 MIN: CPT | Performed by: FAMILY MEDICINE

## 2018-01-17 RX ORDER — CLOTRIMAZOLE AND BETAMETHASONE DIPROPIONATE 10; .64 MG/G; MG/G
1 CREAM TOPICAL 2 TIMES DAILY PRN
Qty: 45 G | Refills: 3 | OUTPATIENT
Start: 2018-01-17 | End: 2018-07-26

## 2018-01-17 NOTE — TELEPHONE ENCOUNTER
Patient waiting on Rx's to be sent to [facility] pharmacy saw Dr Ray Ochoa today. Wanted to also know what she can do for the pain in the meantime? Please advise.

## 2018-01-17 NOTE — TELEPHONE ENCOUNTER
Patient calling back--[facility] has not received RX from today's OV    Spoke with [facility]--ok to phone in Rx pended below--phoned in Rx to [place] at [facility]. Patient made aware.     Please advise    PHYSICAL EXAM:   Physical Exam             ASSESSMENT/PLAN:   No

## 2018-01-17 NOTE — PROGRESS NOTES
HPI:    Patient ID: Rigo Jeffrey is a 27year old female. Hemorrhoids   This is a recurrent problem. The current episode started 1 to 4 weeks ago. The problem occurs intermittently. The problem has been waxing and waning.  Pertinent negatives include no c straining. Sitz baths twice daily ×10-15 minutes. Anusol cream as directed. Call if no improvement in 1-2 weeks. Intertrigo– anterior prepuce clotrimazole/betamethasone cream twice daily ×7 days.     No orders of the defined types were placed in this

## 2018-01-17 NOTE — TELEPHONE ENCOUNTER
Pt called in requesting to have her future prescriptions sent to the [facility] pharmacy in [place], [place]. Pt was seen today and has 2 pending prescriptions.

## 2018-01-22 ENCOUNTER — NURSE TRIAGE (OUTPATIENT)
Dept: FAMILY MEDICINE CLINIC | Facility: CLINIC | Age: 33
End: 2018-01-22

## 2018-01-22 NOTE — TELEPHONE ENCOUNTER
Please let patient know I have sent prescription for Anusol suppository. Try this before appointment.

## 2018-01-22 NOTE — TELEPHONE ENCOUNTER
Action Requested: Summary for Provider     []  Critical Lab, Recommendations Needed  [x] Need Additional Advice  []   FYI    []   Need Orders  [] Need Medications Sent to Pharmacy  []  Other     SUMMARY: Dr Helen Montoya, patient will see you tomorrow, 1730 [address]

## 2018-01-22 NOTE — TELEPHONE ENCOUNTER
Spoke with patient (name and  verified), reviewed information, patient verbalized understanding and agrees with plan.   Relayed Dr Reshma Castillo message, [facility], [facility]. suppository sent to new pharmacy, patient will pick it up after work

## 2018-01-23 ENCOUNTER — OFFICE VISIT (OUTPATIENT)
Dept: FAMILY MEDICINE CLINIC | Facility: CLINIC | Age: 33
End: 2018-01-23

## 2018-01-23 VITALS
WEIGHT: 165 LBS | SYSTOLIC BLOOD PRESSURE: 130 MMHG | DIASTOLIC BLOOD PRESSURE: 87 MMHG | BODY MASS INDEX: 28 KG/M2 | TEMPERATURE: 99 F | HEART RATE: 69 BPM

## 2018-01-23 DIAGNOSIS — K64.4 EXTERNAL HEMORRHOID, BLEEDING: Primary | ICD-10-CM

## 2018-01-23 PROCEDURE — 99212 OFFICE O/P EST SF 10 MIN: CPT | Performed by: FAMILY MEDICINE

## 2018-01-23 PROCEDURE — 99213 OFFICE O/P EST LOW 20 MIN: CPT | Performed by: FAMILY MEDICINE

## 2018-01-23 NOTE — PROGRESS NOTES
HPI:    Patient ID: Princess Owens is a 27year old female. Hemorrhoids   The current episode started 1 to 4 weeks ago. The problem occurs daily. The problem has been waxing and waning. Pertinent negatives include no fever or rash.  The symptoms are aggrava and did notice some improvement in pain today. On exam to small external hemorrhoids with inflammation but no thrombosis. Recommend continue current treatment. If not improved in 1 week consider surgery evaluation.     No orders of the defined types were

## 2018-04-11 ENCOUNTER — TELEPHONE (OUTPATIENT)
Dept: FAMILY MEDICINE CLINIC | Facility: CLINIC | Age: 33
End: 2018-04-11

## 2018-04-11 NOTE — TELEPHONE ENCOUNTER
Spoke with patient and informed of [facility] recommendations. Advised to call back if symptoms progress, worsen or for any other questions/concerns. Patient verbalized understanding and agreement.

## 2018-04-11 NOTE — TELEPHONE ENCOUNTER
Please let patient know I recommend pseudoephedrine 12 hour every morning and Benadryl 25 mg and Afrin nasal spray at bedtime (Afrin only up to 3 days). Use nasal saline to such as Ayr nasal mist several times a day as needed.   Call if not improving in 1

## 2018-04-11 NOTE — TELEPHONE ENCOUNTER
[facility] please advise - patient does not want to come in for appt for evaluation     ----- Message from Princess Owens sent at 4/11/2018  9:04 AM CDT -----  Regarding: Non-Urgent Medical Question  Contact: [phone]  Fadi Perkins,     I am experiencing symptoms and

## 2018-07-26 ENCOUNTER — TELEPHONE (OUTPATIENT)
Dept: OTHER | Age: 33
End: 2018-07-26

## 2018-07-26 ENCOUNTER — OFFICE VISIT (OUTPATIENT)
Dept: FAMILY MEDICINE CLINIC | Facility: CLINIC | Age: 33
End: 2018-07-26
Payer: COMMERCIAL

## 2018-07-26 VITALS
OXYGEN SATURATION: 97 % | WEIGHT: 173.38 LBS | RESPIRATION RATE: 16 BRPM | DIASTOLIC BLOOD PRESSURE: 85 MMHG | BODY MASS INDEX: 29 KG/M2 | TEMPERATURE: 99 F | SYSTOLIC BLOOD PRESSURE: 127 MMHG | HEART RATE: 70 BPM

## 2018-07-26 DIAGNOSIS — J06.9 UPPER RESPIRATORY TRACT INFECTION, UNSPECIFIED TYPE: Primary | ICD-10-CM

## 2018-07-26 PROCEDURE — 99212 OFFICE O/P EST SF 10 MIN: CPT | Performed by: FAMILY MEDICINE

## 2018-07-26 PROCEDURE — 99213 OFFICE O/P EST LOW 20 MIN: CPT | Performed by: FAMILY MEDICINE

## 2018-07-26 RX ORDER — SPIRONOLACTONE 50 MG/1
TABLET, FILM COATED ORAL
Refills: 3 | COMMUNITY
Start: 2018-07-20 | End: 2018-07-26

## 2018-07-26 NOTE — TELEPHONE ENCOUNTER
Patient was left a message to call back.  Transfer to [facility]  Triage further      ----- Message from Willy Jerry sent at 7/25/2018 10:04 AM CDT -----  Regarding: Non-Urgent Medical Question  Contact: [phone]  Hi Dr. Lani Milner,   My , Brenton Jessica, is

## 2018-07-26 NOTE — TELEPHONE ENCOUNTER
Onset of symtpms Sun. 7/22/18. Sore throat, sinus congestion, chest congestion, afebrile. Spouse is ill Dx walking pneumonia. Spouse has 2:45 appt. w/ Dr Melany Rock  This pt would like to come in with him for eval.    Please advise if can be added as well.

## 2018-07-26 NOTE — PROGRESS NOTES
HPI:    Patient ID: Flaquita Zavala is a 27year old female. Cough   This is a new problem. The current episode started in the past 7 days. The problem has been waxing and waning. The cough is non-productive. Associated symptoms include a sore throat.  Perti ago.  Patient particularly concerned as  has had intermittent febrile illness. Exam consistent with URI. Symptomatic care discussed. Follow-up if worsening or if symptoms not resolved in 1 week.     No orders of the defined types were placed in th

## 2018-09-10 NOTE — ANESTHESIA POSTPROCEDURE EVALUATION
Patient: Shayy Bowman    Procedure Summary     Date:  01/15/17 Room / Location:      Anesthesia Start:  8463 Anesthesia Stop:      Procedure:  LABOR ANALGESIA Diagnosis:      Scheduled Providers:   Anesthesiologist:  Celeste Flores MD    Anesthesia Type:  epi

## 2018-09-24 ENCOUNTER — TELEPHONE (OUTPATIENT)
Dept: FAMILY MEDICINE CLINIC | Facility: CLINIC | Age: 33
End: 2018-09-24

## 2018-09-24 NOTE — TELEPHONE ENCOUNTER
Pt has moved to [place] and works down town. Pt stts her family only has one car and it is hard to travel to [facility]. Pt asking if Dr. Edyta Foley can recommend another PCP that is closer to her home ?  Or in [place]?

## 2018-09-26 NOTE — TELEPHONE ENCOUNTER
Please give her info for   [facility] Bandar Alvarez M.D.   Address: [address] [place] [address] [address], [facility], [address] [place] [address]  Phone: [phone]  This is the closest I know

## 2018-10-08 ENCOUNTER — OFFICE VISIT (OUTPATIENT)
Dept: INTERNAL MEDICINE CLINIC | Facility: CLINIC | Age: 33
End: 2018-10-08
Payer: COMMERCIAL

## 2018-10-08 VITALS
SYSTOLIC BLOOD PRESSURE: 127 MMHG | BODY MASS INDEX: 29.85 KG/M2 | RESPIRATION RATE: 18 BRPM | HEIGHT: 64.5 IN | WEIGHT: 177 LBS | DIASTOLIC BLOOD PRESSURE: 85 MMHG | HEART RATE: 70 BPM | TEMPERATURE: 99 F

## 2018-10-08 DIAGNOSIS — F41.9 ANXIETY: ICD-10-CM

## 2018-10-08 DIAGNOSIS — L70.8 OTHER ACNE: Primary | ICD-10-CM

## 2018-10-08 PROCEDURE — 99203 OFFICE O/P NEW LOW 30 MIN: CPT | Performed by: INTERNAL MEDICINE

## 2018-10-08 NOTE — PROGRESS NOTES
HPI:    Patient ID: Yojana Cheney is a 27year old female. HPI  She came today to establish care with new physician.   She   Has she states that she has anxiety which started postpartum she was advised by her OB at that time to see a therapist but that she Hematological: Negative for adenopathy. Does not bruise/bleed easily. Psychiatric/Behavioral: Negative for agitation, behavioral problems, confusion, hallucinations and sleep disturbance. The patient is not nervous/anxious.             Current Outpatien tenderness. Tympanic membrane is not erythematous. Left Ear: Tympanic membrane and external ear normal. No drainage or tenderness. No mastoid tenderness.    Nose: Nose normal. Right sinus exhibits no maxillary sinus tenderness and no frontal sinus tendern normal mood and affect. Her behavior is normal.   Vitals reviewed.            ASSESSMENT/PLAN:   Other acne  (primary encounter diagnosis) will refer to see dermatology   Anxiety-discussed about relaxation technique do some yoga ,will refer to Magnus Azul

## 2019-02-11 ENCOUNTER — HOSPITAL ENCOUNTER (OUTPATIENT)
Dept: GENERAL RADIOLOGY | Facility: HOSPITAL | Age: 34
Discharge: HOME | End: 2019-02-11
Attending: INTERNAL MEDICINE
Payer: COMMERCIAL

## 2019-02-11 ENCOUNTER — TELEPHONE (OUTPATIENT)
Dept: INTERNAL MEDICINE CLINIC | Facility: CLINIC | Age: 34
End: 2019-02-11

## 2019-02-11 ENCOUNTER — OFFICE VISIT (OUTPATIENT)
Dept: INTERNAL MEDICINE CLINIC | Facility: CLINIC | Age: 34
End: 2019-02-11
Payer: COMMERCIAL

## 2019-02-11 VITALS
HEART RATE: 78 BPM | SYSTOLIC BLOOD PRESSURE: 115 MMHG | HEIGHT: 64.5 IN | WEIGHT: 175 LBS | RESPIRATION RATE: 18 BRPM | DIASTOLIC BLOOD PRESSURE: 80 MMHG | TEMPERATURE: 98 F | BODY MASS INDEX: 29.51 KG/M2

## 2019-02-11 DIAGNOSIS — M54.50 LOW BACK PAIN RADIATING TO LOWER EXTREMITY: Primary | ICD-10-CM

## 2019-02-11 DIAGNOSIS — M79.606 LOW BACK PAIN RADIATING TO LOWER EXTREMITY: Primary | ICD-10-CM

## 2019-02-11 DIAGNOSIS — M79.606 LOW BACK PAIN RADIATING TO LOWER EXTREMITY: ICD-10-CM

## 2019-02-11 DIAGNOSIS — M54.50 LOW BACK PAIN RADIATING TO LOWER EXTREMITY: ICD-10-CM

## 2019-02-11 PROCEDURE — 72110 X-RAY EXAM L-2 SPINE 4/>VWS: CPT | Performed by: INTERNAL MEDICINE

## 2019-02-11 PROCEDURE — 99213 OFFICE O/P EST LOW 20 MIN: CPT | Performed by: INTERNAL MEDICINE

## 2019-02-11 RX ORDER — CYCLOBENZAPRINE HCL 5 MG
5 TABLET ORAL NIGHTLY PRN
Qty: 10 TABLET | Refills: 0 | Status: SHIPPED | OUTPATIENT
Start: 2019-02-11 | End: 2019-03-13

## 2019-02-12 NOTE — PATIENT INSTRUCTIONS
Low back pain radiating to lower extremity  (primary encounter diagnosis) etiology?, Careful with back. Correct lifting with legs and not with back. Turn body completely to lift something from side. Back exercises.  Correct posture when sitting with feet fl

## 2019-02-12 NOTE — PROGRESS NOTES
HPI:    Patient ID: Willy Jerry is a 26year old female. HPI  Came in today complaining of lower back pain. According to her symptoms started yesterday.  She was lifting up her 3year-old son when all of a sudden she felt like a popping sound in her low bruise/bleed easily. Psychiatric/Behavioral: Negative for agitation, behavioral problems, confusion, hallucinations and sleep disturbance. The patient is not nervous/anxious.             Current Outpatient Medications:  Cyclobenzaprine HCl 5 MG Oral Tab T Tympanic membrane and external ear normal. No drainage or tenderness. No mastoid tenderness. Tympanic membrane is not erythematous. Left Ear: Tympanic membrane and external ear normal. No drainage or tenderness. No mastoid tenderness.    Nose: Nose normal reflexes. No cranial nerve deficit. She exhibits normal muscle tone. Coordination normal.   Skin: Skin is warm, dry and intact. No rash noted. No cyanosis or erythema. Nails show no clubbing. Psychiatric: She has a normal mood and affect.  Her behavior is

## 2019-02-13 ENCOUNTER — PATIENT MESSAGE (OUTPATIENT)
Dept: INTERNAL MEDICINE CLINIC | Facility: CLINIC | Age: 34
End: 2019-02-13

## 2019-02-14 NOTE — TELEPHONE ENCOUNTER
Please we need to call radiology , there is a mistake in the report that needs to be corrected .  Patient couldn't understand  If the result is positive or negative

## 2019-02-18 ENCOUNTER — TELEPHONE (OUTPATIENT)
Dept: INTERNAL MEDICINE CLINIC | Facility: CLINIC | Age: 34
End: 2019-02-18

## 2019-02-18 NOTE — TELEPHONE ENCOUNTER
If she is not better , I will refer her to physical therapy and physiatry.  Can take ibuprofen as needed for pain, if not better follow up

## 2019-02-18 NOTE — TELEPHONE ENCOUNTER
Spoke with patient about physical therapy and physiatry orders. She will continue to use ibuprofen for one more week and see if pain improves.

## 2019-02-18 NOTE — TELEPHONE ENCOUNTER
Patient states she still continues to have lower back pain .  She want to know what is the next step

## 2019-02-19 NOTE — TELEPHONE ENCOUNTER
Spoke with patient today. Provided phone numbers for both referrals, physical therapy and physiatry.

## 2019-02-19 NOTE — TELEPHONE ENCOUNTER
Spoke with patient. Provided phone numbers for referrals to physical therapy and physiatry. She will call and set up.

## 2019-02-20 ENCOUNTER — OFFICE VISIT (OUTPATIENT)
Dept: NEUROLOGY | Facility: CLINIC | Age: 34
End: 2019-02-20
Payer: COMMERCIAL

## 2019-02-20 ENCOUNTER — TELEPHONE (OUTPATIENT)
Dept: NEUROLOGY | Facility: CLINIC | Age: 34
End: 2019-02-20

## 2019-02-20 VITALS
BODY MASS INDEX: 29.51 KG/M2 | DIASTOLIC BLOOD PRESSURE: 76 MMHG | RESPIRATION RATE: 16 BRPM | SYSTOLIC BLOOD PRESSURE: 107 MMHG | HEART RATE: 76 BPM | HEIGHT: 64.5 IN | WEIGHT: 175 LBS

## 2019-02-20 DIAGNOSIS — M62.9 HAMSTRING TIGHTNESS OF BOTH LOWER EXTREMITIES: ICD-10-CM

## 2019-02-20 DIAGNOSIS — M54.9 TRIGGER POINT WITH BACK PAIN: ICD-10-CM

## 2019-02-20 DIAGNOSIS — M53.3 SACROILIAC JOINT DYSFUNCTION OF LEFT SIDE: ICD-10-CM

## 2019-02-20 DIAGNOSIS — M54.16 LUMBAR RADICULOPATHY, ACUTE: Primary | ICD-10-CM

## 2019-02-20 PROCEDURE — 99244 OFF/OP CNSLTJ NEW/EST MOD 40: CPT | Performed by: PHYSICAL MEDICINE & REHABILITATION

## 2019-02-20 RX ORDER — MULTIVITAMIN
1 TABLET ORAL DAILY
Status: ON HOLD | COMMUNITY
End: 2019-12-14

## 2019-02-20 RX ORDER — METHYLPREDNISOLONE 4 MG/1
TABLET ORAL
Qty: 1 PACKAGE | Refills: 0 | Status: SHIPPED | OUTPATIENT
Start: 2019-02-20 | End: 2019-03-13 | Stop reason: ALTCHOICE

## 2019-02-20 RX ORDER — CYCLOBENZAPRINE HCL 10 MG
10 TABLET ORAL NIGHTLY PRN
Qty: 30 TABLET | Refills: 0 | Status: ON HOLD | OUTPATIENT
Start: 2019-02-20 | End: 2019-12-14

## 2019-02-20 RX ORDER — MULTIVIT-MIN/IRON/FOLIC ACID/K 18-600-40
1 CAPSULE ORAL DAILY
COMMUNITY
End: 2019-12-18

## 2019-02-20 NOTE — TELEPHONE ENCOUNTER
[employer] for authorization of approval for MRI L-spine wo cpt code 44347. Approval was givenAuthorization # [id number] effective 02/20/19 to 03/21/19. Will call Pt. To inform. Pt. Informed of approval. She will call to schedule appt.

## 2019-02-20 NOTE — PATIENT INSTRUCTIONS
-Start physical therapy  -Home exercises as advised by PT  -Get MRI of the lumbar spine completed  -Medrol dose pack and Flexeril as tolerated, no NSAID medication during this time  -Ice/Heat as tolerated  -Follow up in 3 weeks

## 2019-02-20 NOTE — PROGRESS NOTES
[address]  NEW PATIENT EVALUATION    Consultation as a request of Dr. Ronni Bravo  Chief Complaint: left back pain.     HISTORY OF PRESENT ILLNESS:   Patient presents with:  Low Back Pain: New pt presents with int Multiple Vitamin (ONE-DAILY MULTI VITAMINS) Oral Tab Take 1 tablet by mouth daily.  Disp:  Rfl:    methylPREDNISolone 4 MG Oral Tablet Therapy Pack As directed Disp: 1 Package Rfl: 0   Cyclobenzaprine HCl 10 MG Oral Tab Take 1 tablet (10 mg total) by mout dyspnea  Gastrointestinal: negative for abdominal pain, constipation and diarrhea  Genitourinary:negative for dysuria, frequency and urinary incontinence  Hematologic/lymphatic: negative for bleeding and easy bruising  Musculoskeletal:positive for back roberth negative for contralateral SI joint pain and ipsilateral hip pain / tightness  Lizbeth's test: no SI joint instablitiy  Straight leg raise: negative for radicular pain symptoms  Slump test: negative for pain symptoms for radicular pain symptoms  Sujatha weeks without any inciting injury or trauma. Pain has been persistent and getting worse recently with radiation to the posterior thigh up to the knee. She denies any numbness tingling in the toe or any weakness.   She denies any new loss of bowel bladder

## 2019-02-26 ENCOUNTER — HOSPITAL ENCOUNTER (OUTPATIENT)
Dept: MRI IMAGING | Facility: HOSPITAL | Age: 34
Discharge: HOME | End: 2019-02-26
Attending: PHYSICAL MEDICINE & REHABILITATION
Payer: COMMERCIAL

## 2019-02-26 DIAGNOSIS — M54.16 LUMBAR RADICULOPATHY: ICD-10-CM

## 2019-02-26 DIAGNOSIS — M54.16 LUMBAR RADICULOPATHY, ACUTE: ICD-10-CM

## 2019-02-26 PROCEDURE — 72148 MRI LUMBAR SPINE W/O DYE: CPT | Performed by: PHYSICAL MEDICINE & REHABILITATION

## 2019-03-04 ENCOUNTER — MED REC SCAN ONLY (OUTPATIENT)
Dept: NEUROLOGY | Facility: CLINIC | Age: 34
End: 2019-03-04

## 2019-03-13 ENCOUNTER — OFFICE VISIT (OUTPATIENT)
Dept: NEUROLOGY | Facility: CLINIC | Age: 34
End: 2019-03-13
Payer: COMMERCIAL

## 2019-03-13 VITALS
RESPIRATION RATE: 16 BRPM | HEIGHT: 64.5 IN | WEIGHT: 175 LBS | HEART RATE: 74 BPM | BODY MASS INDEX: 29.51 KG/M2 | SYSTOLIC BLOOD PRESSURE: 100 MMHG | DIASTOLIC BLOOD PRESSURE: 74 MMHG

## 2019-03-13 DIAGNOSIS — M54.16 LUMBAR RADICULOPATHY, ACUTE: Primary | ICD-10-CM

## 2019-03-13 DIAGNOSIS — M62.89 PELVIC FLOOR DYSFUNCTION: ICD-10-CM

## 2019-03-13 DIAGNOSIS — M54.9 TRIGGER POINT WITH BACK PAIN: ICD-10-CM

## 2019-03-13 DIAGNOSIS — M53.3 SACROILIAC JOINT DYSFUNCTION OF LEFT SIDE: ICD-10-CM

## 2019-03-13 DIAGNOSIS — M62.9 HAMSTRING TIGHTNESS OF BOTH LOWER EXTREMITIES: ICD-10-CM

## 2019-03-13 PROCEDURE — 99214 OFFICE O/P EST MOD 30 MIN: CPT | Performed by: PHYSICAL MEDICINE & REHABILITATION

## 2019-03-13 RX ORDER — IBUPROFEN 200 MG
200 TABLET ORAL EVERY 6 HOURS PRN
COMMUNITY
End: 2021-02-13

## 2019-03-13 NOTE — PROGRESS NOTES
[address]  NEW PATIENT EVALUATION    Chief Complaint: left back pain.     HISTORY OF PRESENT ILLNESS:   Patient presents with:  Low Back Pain: LOV: 2/20/19 - Pt f/u for left sided LBP that has improved since Cyclobenzaprine HCl 10 MG Oral Tab Take 1 tablet (10 mg total) by mouth nightly as needed for Muscle spasms. Disp: 30 tablet Rfl: 0   Fluticasone Propionate 50 MCG/ACT Nasal Suspension by Each Nare route daily.  Disp:  Rfl:          ALLERGIES:     Dinh Pavon numbness/tingling, negative for weakness   Behavioral/Psych: negative for anxiety and depression  Endocrine: negative for diabetic symptoms including blurry vision, polydipsia, polyphagia and polyuria  Allergic/Immunologic: negative for urticaria      PHYS A1C  Lab Results   Component Value Date    WBC 4.7 11/17/2017    RBC 5.03 11/17/2017    HGB 15.2 11/17/2017    HCT 44.7 11/17/2017    MCV 89.0 11/17/2017    MCH 30.2 11/17/2017    MCHC 34.0 11/17/2017    RDW 12.4 11/17/2017     11/17/2017    MPV 8.2 At this time, considering improvement in symptoms with physical therapy I advised the patient to follow-up with me as needed and to continue home exercises. I would like her to take the muscle relaxer as needed and to NSAIDs as needed as well.   I advised

## 2019-03-13 NOTE — PATIENT INSTRUCTIONS
-continue physical therapy  -Consider pelvic floor therapy with [facility]   -Home exercises as advised by PT  -Continue NSAID and muscle relaxer as needed  -Ice/Heat as tolerated  -Follow up with me as needed or if symptoms are worse

## 2019-03-15 ENCOUNTER — TELEPHONE (OUTPATIENT)
Dept: NEUROLOGY | Facility: CLINIC | Age: 34
End: 2019-03-15

## 2019-03-15 NOTE — TELEPHONE ENCOUNTER
Patient asking if her back problem will last all her life or if it will correct itself. Also, asking if flexeril is addictive. Please advise.

## 2019-04-01 ENCOUNTER — MED REC SCAN ONLY (OUTPATIENT)
Dept: NEUROLOGY | Facility: CLINIC | Age: 34
End: 2019-04-01

## 2019-05-23 ENCOUNTER — TELEPHONE (OUTPATIENT)
Dept: INTERNAL MEDICINE CLINIC | Facility: CLINIC | Age: 34
End: 2019-05-23

## 2019-05-23 NOTE — TELEPHONE ENCOUNTER
Most likely is a viral gastroenteritis since her  had the similar symptoms as well she needs to follow the BRAT  diet drink more fluids drink Gatorade.   If not better then we need to do stool work-up.,  If her  Symptoms get worse during the weekend

## 2019-05-23 NOTE — TELEPHONE ENCOUNTER
Again check was going on please what symptoms she has, how many days, any abdominal pain, any fever?

## 2019-05-23 NOTE — TELEPHONE ENCOUNTER
Patient states she started with one episode of vomiting on Tuesday. But then transitioned to loose stool. Tried eating a bland diet and fluids but diarrhea has worsened today. Almost everything she eats or drinks goes right through her. Denies fever.  She d

## 2019-05-28 ENCOUNTER — APPOINTMENT (OUTPATIENT)
Dept: LAB | Facility: HOSPITAL | Age: 34
End: 2019-05-28
Attending: INTERNAL MEDICINE
Payer: COMMERCIAL

## 2019-05-28 ENCOUNTER — TELEPHONE (OUTPATIENT)
Dept: INTERNAL MEDICINE CLINIC | Facility: CLINIC | Age: 34
End: 2019-05-28

## 2019-05-28 DIAGNOSIS — R19.7 DIARRHEA OF PRESUMED INFECTIOUS ORIGIN: ICD-10-CM

## 2019-05-28 PROCEDURE — 80048 BASIC METABOLIC PNL TOTAL CA: CPT

## 2019-05-28 PROCEDURE — 36415 COLL VENOUS BLD VENIPUNCTURE: CPT

## 2019-05-28 NOTE — TELEPHONE ENCOUNTER
Patient calling in follow up to last week. She stated the episode of diarrhea cleared up. But yesterday another episode hit. She is 11 weeks pregnant. Asking your advice.

## 2019-05-28 NOTE — TELEPHONE ENCOUNTER
If she still having diarrhea. I can order stool work-up for C. difficile. And BMP.,  Needs to follow a brat diet. Drink more fluid, drink Gatorade. We have to make sure the diarrhea is not infectious.

## 2019-05-28 NOTE — TELEPHONE ENCOUNTER
Spoke with patient and provided MD's instructions. She expressed understanding and stated she would comply.

## 2019-05-29 ENCOUNTER — APPOINTMENT (OUTPATIENT)
Dept: LAB | Facility: HOSPITAL | Age: 34
End: 2019-05-29
Attending: INTERNAL MEDICINE
Payer: COMMERCIAL

## 2019-05-29 DIAGNOSIS — R19.7 DIARRHEA OF PRESUMED INFECTIOUS ORIGIN: ICD-10-CM

## 2019-05-29 PROCEDURE — 87493 C DIFF AMPLIFIED PROBE: CPT

## 2019-05-30 ENCOUNTER — TELEPHONE (OUTPATIENT)
Dept: INTERNAL MEDICINE CLINIC | Facility: CLINIC | Age: 34
End: 2019-05-30

## 2019-05-30 NOTE — TELEPHONE ENCOUNTER
Patient calling since receiving her results via my chart patient states she has had increase frequency and what are the next step for treatment.

## 2019-05-30 NOTE — TELEPHONE ENCOUNTER
Spoke with her , she does have soft stool but not watery - 3 episode yesterday , c diff negative , explained to her that she needs to follow a brat diet since she is 12 weeks pregnant I will suggest not to take any Imodium.   Drink more Gatorade, drink more

## 2019-06-05 ENCOUNTER — TELEPHONE (OUTPATIENT)
Dept: INTERNAL MEDICINE CLINIC | Facility: CLINIC | Age: 34
End: 2019-06-05

## 2019-06-05 NOTE — TELEPHONE ENCOUNTER
Patient called, she wanted to let you know that she has not had diarrhea since she has started on the bland diet, and no vomiting. However her two year old son was diagnosed with zepovirus, she is not sure how to spell it.   Patients son has had the same s

## 2019-06-05 NOTE — TELEPHONE ENCOUNTER
Patient notified of Dr. Patterson Pack response. Will start to advance diet to see how it is tolerated. If any relapse or episode of diarrhea will call office.

## 2019-06-05 NOTE — TELEPHONE ENCOUNTER
Its ok, its virus if she doesn't have symptoms anymore should be ok, .wash hands after using bathroom , more fluids

## 2019-06-19 ENCOUNTER — TELEPHONE (OUTPATIENT)
Dept: NEUROLOGY | Facility: CLINIC | Age: 34
End: 2019-06-19

## 2019-06-19 NOTE — TELEPHONE ENCOUNTER
S/w patient who states 1 week ago her back pain started back up in the center of the back above the tailbone w/ radiation to buttocks. She is 14 weeks pregnant and has not been doing her HEP since beginning of pregnancy but trying to get back into it.  Stat

## 2019-09-27 ENCOUNTER — NURSE TRIAGE (OUTPATIENT)
Dept: INTERNAL MEDICINE CLINIC | Facility: CLINIC | Age: 34
End: 2019-09-27

## 2019-09-27 ENCOUNTER — OFFICE VISIT (OUTPATIENT)
Dept: INTERNAL MEDICINE CLINIC | Facility: CLINIC | Age: 34
End: 2019-09-27
Payer: COMMERCIAL

## 2019-09-27 VITALS
TEMPERATURE: 98 F | DIASTOLIC BLOOD PRESSURE: 74 MMHG | RESPIRATION RATE: 18 BRPM | SYSTOLIC BLOOD PRESSURE: 105 MMHG | OXYGEN SATURATION: 98 % | BODY MASS INDEX: 30.56 KG/M2 | HEIGHT: 64 IN | WEIGHT: 179 LBS | HEART RATE: 84 BPM

## 2019-09-27 DIAGNOSIS — J06.9 URI, ACUTE: Primary | ICD-10-CM

## 2019-09-27 PROCEDURE — 99213 OFFICE O/P EST LOW 20 MIN: CPT | Performed by: INTERNAL MEDICINE

## 2019-09-27 RX ORDER — ASPIRIN 81 MG/1
TABLET, CHEWABLE ORAL
Status: ON HOLD | COMMUNITY
End: 2019-12-14

## 2019-09-27 NOTE — PROGRESS NOTES
HPI:    Patient ID: Bonnie Winter is a 26year old female. HPI   she is here today complaining of uri symptoms sicne Sunday .  According to her  Her symptoms started on Sunday with cough dry , no fever or chills   Today morning she started having sore thro route daily. Disp:  Rfl:    aspirin (ASPIRIN 81) 81 MG Oral Chew Tab Aspir-81 Disp:  Rfl:    ibuprofen 200 MG Oral Tab Take 200 mg by mouth every 6 (six) hours as needed for Pain.  Disp:  Rfl:    Cholecalciferol (VITAMIN D) 2000 units Oral Cap Take 1 capsul not erythematous. Left Ear: Tympanic membrane and external ear normal. No drainage or tenderness. No mastoid tenderness. Nose: Nose normal. Right sinus exhibits no maxillary sinus tenderness and no frontal sinus tenderness.  Left sinus exhibits no maxil behavior is normal.   Vitals reviewed.            ASSESSMENT/PLAN:     Michel Nuno - acute , rapid strep is negative , advised her to drink more fluids, salt gargles can help ,  Cough medication as needed , if any fever ,if cough  gets worse , if any sob call us

## 2019-09-27 NOTE — TELEPHONE ENCOUNTER
Action Requested: Summary for Provider     []  Critical Lab, Recommendations Needed  [x] Need Additional Advice  []   FYI    []   Need Orders  [] Need Medications Sent to Pharmacy  []  Other     SUMMARY: Patient advised follow up appt, scheduled today for

## 2019-09-27 NOTE — PATIENT INSTRUCTIONS
Bhavna Godoy - acute , rapid strep is negative , advised her to drink more fluids, salt gargles can help ,  Cough medication as needed , if any fever ,if cough  gets worse , if any sob call us

## 2019-12-12 ENCOUNTER — TELEPHONE (OUTPATIENT)
Dept: INTERNAL MEDICINE CLINIC | Facility: CLINIC | Age: 34
End: 2019-12-12

## 2019-12-12 NOTE — TELEPHONE ENCOUNTER
Pt returning call; informed her of Dr Italo Babb E message below. Dr Balaji Glover, pt is asking what can you recommend to do with the pain? Please advise.

## 2019-12-12 NOTE — TELEPHONE ENCOUNTER
Spoke with patient and advised of Dr. Bret Woodson message below, patient verbalized understanding. Patient states she will call back if the popping is still happening after she delivers the baby. No further questions at this time.

## 2019-12-12 NOTE — TELEPHONE ENCOUNTER
I also think is musculoskeletal and I do not think that she needs to worry about that, we can monitor for now

## 2019-12-12 NOTE — TELEPHONE ENCOUNTER
Patient (who is 9 months pregnant and due Saturday) states for approximately a few weeks having pain and feeling \"popping\" in her right breast.  Patient was seen by her OBGYN for this, but informed \"it's nothing to worry about\" and did not feel any lum

## 2019-12-14 ENCOUNTER — ANESTHESIA EVENT (OUTPATIENT)
Dept: OBGYN UNIT | Facility: HOSPITAL | Age: 34
End: 2019-12-14
Payer: COMMERCIAL

## 2019-12-14 ENCOUNTER — ANESTHESIA (OUTPATIENT)
Dept: OBGYN UNIT | Facility: HOSPITAL | Age: 34
End: 2019-12-14
Payer: COMMERCIAL

## 2019-12-14 ENCOUNTER — HOSPITAL ENCOUNTER (INPATIENT)
Facility: HOSPITAL | Age: 34
LOS: 2 days | Discharge: HOME | End: 2019-12-16
Attending: OBSTETRICS & GYNECOLOGY | Admitting: OBSTETRICS & GYNECOLOGY
Payer: COMMERCIAL

## 2019-12-14 PROBLEM — O26.93 PREGNANCY RELATED CONDITION IN THIRD TRIMESTER: Status: ACTIVE | Noted: 2019-12-14

## 2019-12-14 PROBLEM — O26.93 PREGNANCY RELATED CONDITION IN THIRD TRIMESTER (HCC): Status: RESOLVED | Noted: 2019-12-14 | Resolved: 2019-12-14

## 2019-12-14 PROBLEM — Z37.9 NORMAL LABOR (HCC): Status: ACTIVE | Noted: 2019-12-14

## 2019-12-14 PROBLEM — Z78.9 SUSCEPTIBLE VARICELLA: Status: ACTIVE | Noted: 2019-12-14

## 2019-12-14 PROBLEM — O26.93 PREGNANCY RELATED CONDITION IN THIRD TRIMESTER (HCC): Status: ACTIVE | Noted: 2019-12-14

## 2019-12-14 PROBLEM — O26.93 PREGNANCY RELATED CONDITION IN THIRD TRIMESTER: Status: RESOLVED | Noted: 2019-12-14 | Resolved: 2019-12-14

## 2019-12-14 PROBLEM — O09.529 ADVANCED MATERNAL AGE IN MULTIGRAVIDA: Status: ACTIVE | Noted: 2019-12-14

## 2019-12-14 PROBLEM — O09.529 ADVANCED MATERNAL AGE IN MULTIGRAVIDA (HCC): Status: ACTIVE | Noted: 2019-12-14

## 2019-12-14 PROBLEM — Z86.59 HISTORY OF ANXIETY: Status: ACTIVE | Noted: 2019-12-14

## 2019-12-14 PROBLEM — Z37.9 NORMAL LABOR: Status: ACTIVE | Noted: 2019-12-14

## 2019-12-14 PROCEDURE — 86850 RBC ANTIBODY SCREEN: CPT | Performed by: OBSTETRICS & GYNECOLOGY

## 2019-12-14 PROCEDURE — 85027 COMPLETE CBC AUTOMATED: CPT | Performed by: OBSTETRICS & GYNECOLOGY

## 2019-12-14 PROCEDURE — 0KQM0ZZ REPAIR PERINEUM MUSCLE, OPEN APPROACH: ICD-10-PCS | Performed by: OBSTETRICS & GYNECOLOGY

## 2019-12-14 PROCEDURE — 86900 BLOOD TYPING SEROLOGIC ABO: CPT | Performed by: OBSTETRICS & GYNECOLOGY

## 2019-12-14 PROCEDURE — 86901 BLOOD TYPING SEROLOGIC RH(D): CPT | Performed by: OBSTETRICS & GYNECOLOGY

## 2019-12-14 PROCEDURE — 99214 OFFICE O/P EST MOD 30 MIN: CPT

## 2019-12-14 RX ORDER — ACETAMINOPHEN 500 MG
500 TABLET ORAL EVERY 6 HOURS PRN
Status: DISCONTINUED | OUTPATIENT
Start: 2019-12-14 | End: 2019-12-16

## 2019-12-14 RX ORDER — LIDOCAINE HYDROCHLORIDE AND EPINEPHRINE 20; 5 MG/ML; UG/ML
10 INJECTION, SOLUTION EPIDURAL; INFILTRATION; INTRACAUDAL; PERINEURAL ONCE
Status: COMPLETED | OUTPATIENT
Start: 2019-12-14 | End: 2019-12-14

## 2019-12-14 RX ORDER — SODIUM CHLORIDE 0.9 % (FLUSH) 0.9 %
10 SYRINGE (ML) INJECTION AS NEEDED
Status: DISCONTINUED | OUTPATIENT
Start: 2019-12-14 | End: 2019-12-14

## 2019-12-14 RX ORDER — CHOLECALCIFEROL (VITAMIN D3) 25 MCG
1 TABLET,CHEWABLE ORAL DAILY
Status: DISCONTINUED | OUTPATIENT
Start: 2019-12-14 | End: 2019-12-16

## 2019-12-14 RX ORDER — NALBUPHINE HCL 10 MG/ML
2.5 AMPUL (ML) INJECTION
Status: DISCONTINUED | OUTPATIENT
Start: 2019-12-14 | End: 2019-12-14

## 2019-12-14 RX ORDER — IBUPROFEN 400 MG/1
400 TABLET ORAL EVERY 4 HOURS PRN
Status: DISCONTINUED | OUTPATIENT
Start: 2019-12-14 | End: 2019-12-16

## 2019-12-14 RX ORDER — IBUPROFEN 600 MG/1
600 TABLET ORAL ONCE AS NEEDED
Status: DISCONTINUED | OUTPATIENT
Start: 2019-12-14 | End: 2019-12-14

## 2019-12-14 RX ORDER — ONDANSETRON 2 MG/ML
4 INJECTION INTRAMUSCULAR; INTRAVENOUS EVERY 6 HOURS PRN
Status: DISCONTINUED | OUTPATIENT
Start: 2019-12-14 | End: 2019-12-16

## 2019-12-14 RX ORDER — PRENATAL VIT/IRON FUM/FOLIC AC 27MG-0.8MG
1 TABLET ORAL DAILY
COMMUNITY
End: 2021-06-08

## 2019-12-14 RX ORDER — DOCUSATE SODIUM 100 MG/1
100 CAPSULE, LIQUID FILLED ORAL 2 TIMES DAILY
Status: DISCONTINUED | OUTPATIENT
Start: 2019-12-14 | End: 2019-12-16

## 2019-12-14 RX ORDER — BISACODYL 10 MG
10 SUPPOSITORY, RECTAL RECTAL ONCE AS NEEDED
Status: DISCONTINUED | OUTPATIENT
Start: 2019-12-14 | End: 2019-12-16

## 2019-12-14 RX ORDER — TERBUTALINE SULFATE 1 MG/ML
0.25 INJECTION, SOLUTION SUBCUTANEOUS AS NEEDED
Status: DISCONTINUED | OUTPATIENT
Start: 2019-12-14 | End: 2019-12-14

## 2019-12-14 RX ORDER — EPHEDRINE SULFATE/0.9% NACL/PF 25 MG/5 ML
5 SYRINGE (ML) INTRAVENOUS AS NEEDED
Status: DISCONTINUED | OUTPATIENT
Start: 2019-12-14 | End: 2019-12-14

## 2019-12-14 RX ORDER — SODIUM CHLORIDE 0.9 % (FLUSH) 0.9 %
10 SYRINGE (ML) INJECTION AS NEEDED
Status: DISCONTINUED | OUTPATIENT
Start: 2019-12-14 | End: 2019-12-16

## 2019-12-14 RX ORDER — DIAPER,BRIEF,INFANT-TODD,DISP
1 EACH MISCELLANEOUS EVERY 6 HOURS PRN
Status: DISCONTINUED | OUTPATIENT
Start: 2019-12-14 | End: 2019-12-16

## 2019-12-14 RX ORDER — LIDOCAINE HYDROCHLORIDE 10 MG/ML
30 INJECTION, SOLUTION EPIDURAL; INFILTRATION; INTRACAUDAL; PERINEURAL ONCE
Status: COMPLETED | OUTPATIENT
Start: 2019-12-14 | End: 2019-12-14

## 2019-12-14 RX ORDER — AMMONIA INHALANTS 0.04 G/.3ML
0.3 INHALANT RESPIRATORY (INHALATION) AS NEEDED
Status: DISCONTINUED | OUTPATIENT
Start: 2019-12-14 | End: 2019-12-14

## 2019-12-14 RX ORDER — TRISODIUM CITRATE DIHYDRATE AND CITRIC ACID MONOHYDRATE 500; 334 MG/5ML; MG/5ML
30 SOLUTION ORAL AS NEEDED
Status: DISCONTINUED | OUTPATIENT
Start: 2019-12-14 | End: 2019-12-14

## 2019-12-14 RX ORDER — AMMONIA INHALANTS 0.04 G/.3ML
0.3 INHALANT RESPIRATORY (INHALATION) AS NEEDED
Status: DISCONTINUED | OUTPATIENT
Start: 2019-12-14 | End: 2019-12-16

## 2019-12-14 RX ORDER — LIDOCAINE HYDROCHLORIDE AND EPINEPHRINE 15; 5 MG/ML; UG/ML
INJECTION, SOLUTION EPIDURAL
Status: COMPLETED | OUTPATIENT
Start: 2019-12-14 | End: 2019-12-14

## 2019-12-14 RX ORDER — SODIUM CHLORIDE, SODIUM LACTATE, POTASSIUM CHLORIDE, CALCIUM CHLORIDE 600; 310; 30; 20 MG/100ML; MG/100ML; MG/100ML; MG/100ML
INJECTION, SOLUTION INTRAVENOUS CONTINUOUS
Status: DISCONTINUED | OUTPATIENT
Start: 2019-12-14 | End: 2019-12-14

## 2019-12-14 RX ORDER — DEXTROSE, SODIUM CHLORIDE, SODIUM LACTATE, POTASSIUM CHLORIDE, AND CALCIUM CHLORIDE 5; .6; .31; .03; .02 G/100ML; G/100ML; G/100ML; G/100ML; G/100ML
INJECTION, SOLUTION INTRAVENOUS CONTINUOUS
Status: DISCONTINUED | OUTPATIENT
Start: 2019-12-14 | End: 2019-12-14

## 2019-12-14 RX ORDER — BUPIVACAINE HYDROCHLORIDE 2.5 MG/ML
10 INJECTION, SOLUTION EPIDURAL; INFILTRATION; INTRACAUDAL ONCE
Status: COMPLETED | OUTPATIENT
Start: 2019-12-14 | End: 2019-12-14

## 2019-12-14 RX ORDER — SIMETHICONE 80 MG
80 TABLET,CHEWABLE ORAL 3 TIMES DAILY PRN
Status: DISCONTINUED | OUTPATIENT
Start: 2019-12-14 | End: 2019-12-16

## 2019-12-14 RX ORDER — IBUPROFEN 600 MG/1
600 TABLET ORAL EVERY 4 HOURS PRN
Status: DISCONTINUED | OUTPATIENT
Start: 2019-12-14 | End: 2019-12-16

## 2019-12-14 RX ORDER — IBUPROFEN 200 MG
200 TABLET ORAL EVERY 4 HOURS PRN
Status: DISCONTINUED | OUTPATIENT
Start: 2019-12-14 | End: 2019-12-16

## 2019-12-14 RX ADMIN — BUPIVACAINE HYDROCHLORIDE 5 ML: 2.5 INJECTION, SOLUTION EPIDURAL; INFILTRATION; INTRACAUDAL at 08:37:00

## 2019-12-14 RX ADMIN — LIDOCAINE HYDROCHLORIDE AND EPINEPHRINE 3 ML: 15; 5 INJECTION, SOLUTION EPIDURAL at 08:31:00

## 2019-12-14 NOTE — ANESTHESIA PREPROCEDURE EVALUATION
Anesthesia PreOp Note    HPI:     Avelino Borden is a 37year old female who presents for preoperative consultation requested by: * No surgeons listed *    Date of Surgery: 12/14/2019    * No procedures listed *  Indication: * No pre-op diagnosis entered * month at Unknown time  Multiple Vitamin (ONE-DAILY MULTI VITAMINS) Oral Tab, Take 1 tablet by mouth daily. , Disp: , Rfl: , More than a month at Unknown time  Cyclobenzaprine HCl 10 MG Oral Tab, Take 1 tablet (10 mg total) by mouth nightly as needed for Mus mg/5 ml injection 5 mg, 5 mg, Intravenous, PRN, Claudine Bustos MD  Nalbuphine HCl (NUBAIN) injection 2.5 mg, 2.5 mg, Intravenous, Q15 Min PRN, Edwin Lockhart MD  lactated ringers IV bolus 1,000 mL, 1,000 mL, Intravenous, Once, Nahid Álvarez MD Alcohol use: Yes        Comment: occasionally      Drug use: No      Sexual activity: Not on file    Lifestyle      Physical activity:        Days per week: Not on file        Minutes per session: Not on file      Stress: Not on file    Relationships 12/14/19  0841 12/14/19  0844   BP: (!) 115/91 121/66 120/69 112/70   Pulse: 79 71 77 80   Resp:    17   Temp:       TempSrc:       SpO2:    98%        Anesthesia Evaluation     Patient summary reviewed    Airway   Mallampati: II  TM distance: >3 FB  Neck

## 2019-12-14 NOTE — DISCHARGE SUMMARY
[facility]    Discharge Summary    Lala Gaspar Patient Status:  Inpatient    10/14/1985 MRN [id number]   Location [address]  Attending Slyvester Egnlish MD   [facility] Day # 0       Admission Date: 2019  Dischar

## 2019-12-14 NOTE — ANESTHESIA PROCEDURE NOTES
Labor Analgesia  Date/Time: 12/14/2019 8:31 AM  Performed by: Kristal Lofton MD  Authorized by: Kristal Lofton MD       General Information and Staff    Start Time:  12/14/2019 8:21 AM  End Time:  12/14/2019 8:35 AM  Anesthesiologist:  Kristal Lofton MD  P

## 2019-12-14 NOTE — PROGRESS NOTES
Patient up to bathroom with assist x 2. Voided. Patient transferred to mother/baby room 366 per wheelchair in stable condition with baby and personal belongings. Accompanied by significant other and staff. Report given to mother/baby RN.

## 2019-12-14 NOTE — PROGRESS NOTES
Received patient into room 366 via [facility]. Bedside shift report received from [employer]. Pt transferred to bed. Bed in lowest and locked position. Side rails up x2. VSS. IV site unremarkable.  Baby present in open crib.  ID bands matched. Kam Ford and family o

## 2019-12-14 NOTE — L&D DELIVERY NOTE
[facility] HOSP - [facility]    Vaginal Delivery Note    Bk Jolly Patient Status:  Inpatient    10/14/1985 MRN [id number]   Location [address] Attending Ney Rodgers MD   Hosp Day # 0 PCP Nereida Larsen MD     Delivery

## 2019-12-14 NOTE — H&P
[address] C[address] Patient Status:  Inpatient    10/14/1985 MRN [id number]   Location [address]  Attending Sylvester English MD   Hosp Day # 0 PCP Elan Maguire MD     Date of Admissio Grandfather    • Cancer Paternal Grandfather         lung   • Allergies Sister    • Asthma Other         cousin   • Breast Cancer Paternal Aunt      Social History: Social History    Tobacco Use      Smoking status: Never Smoker      Smokeless tobacco: Nev ASSESSMENT:    40 and 0/7 weeks gestation. Inadequate uterine activity - intensity or frequency. Obstetrical history significant for advanced maternal age, varicella non immune. History of anxiety.     PLAN:    Risks, benefits, alternatives and possible

## 2019-12-14 NOTE — PROGRESS NOTES
Pt is a 37year old female admitted to LDR3/LDR3-A. Patient presents with:  Contractions     Pt is  40w0d intra-uterine pregnancy. History obtained, consents signed. Oriented to room, staff, and plan of care.

## 2019-12-15 PROCEDURE — 85018 HEMOGLOBIN: CPT | Performed by: OBSTETRICS & GYNECOLOGY

## 2019-12-15 PROCEDURE — 85014 HEMATOCRIT: CPT | Performed by: OBSTETRICS & GYNECOLOGY

## 2019-12-15 PROCEDURE — 90471 IMMUNIZATION ADMIN: CPT

## 2019-12-15 NOTE — PROGRESS NOTES
[facility]    OB/GYNE Progress Note      Nichole Cosbyjeanmarie Patient Status:  Inpatient    10/14/1985 MRN [id number]   Location [facility] 3SE Attending Jacinta Moraes MD   Hosp Day # 1 PCP Madie Antonio MD       Assessment/Plan   Discuss

## 2019-12-15 NOTE — PLAN OF CARE
Problem: Patient Centered Care  Goal: Patient preferences are identified and integrated in the patient's plan of care  Description  Interventions:  - What would you like us to know as we care for you?   - Provide timely, complete, and accurate informatio hematoma. - Assess bladder function and monitor for bladder distention.  - Provide/instruct/assist with pericare as needed. - Provide VTE prophylaxis as needed. - Monitor bowel function.  - Encourage ambulation and provide assistance as needed.   - Asses needed. - Assess for and manage engorgement. - Provide breast feeding education handouts and information on community breast feeding support.    Outcome: Progressing  Goal: Establishment of adequate milk supply with medication/procedure interruptions  Breezy needed to be done on shift. Sat and conversed with patient, encouraged questions, and attentive listening done by RN. Will continue to monitor.

## 2019-12-16 VITALS
OXYGEN SATURATION: 98 % | SYSTOLIC BLOOD PRESSURE: 128 MMHG | RESPIRATION RATE: 18 BRPM | DIASTOLIC BLOOD PRESSURE: 52 MMHG | HEART RATE: 110 BPM | TEMPERATURE: 98 F

## 2019-12-16 PROBLEM — Z37.9 NORMAL LABOR: Status: RESOLVED | Noted: 2019-12-14 | Resolved: 2019-12-16

## 2019-12-16 PROBLEM — Z37.9 NORMAL LABOR (HCC): Status: RESOLVED | Noted: 2019-12-14 | Resolved: 2019-12-16

## 2019-12-16 NOTE — PLAN OF CARE
Problem: Patient Centered Care  Goal: Patient preferences are identified and integrated in the patient's plan of care  Description  Interventions:  - What would you like us to know as we care for you?  Safe delivery, healthy baby  - Provide timely, comple and assess for signs and symptoms of infection and hematoma. - Assess bladder function and monitor for bladder distention.  - Provide/instruct/assist with pericare as needed. - Provide VTE prophylaxis as needed.   - Monitor bowel function.  - Encourage am Encourage skin-to-skin contact. - Provide LC support as needed. - Assess for and manage engorgement. - Provide breast feeding education handouts and information on community breast feeding support.    Outcome: Adequate for Discharge  Goal: Establishment

## 2019-12-16 NOTE — PROGRESS NOTES
Focus: Discharge  D: Discharge order received from MD.  A: Postpartum Discharge folder given, Discharge Medication form reviewed, signed, and given to the patient. ID band matched with baby band.   Patient informed when to make a follow-up appointment with

## 2019-12-16 NOTE — PROGRESS NOTES
[facility]    OB/GYNE Progress Note      Llamas Session Patient Status:  Inpatient    10/14/1985 MRN [id number]   Location [facility] 3SE Attending Radha Claros MD   Hosp Day # 2 PCP Familia Foss MD       Assessment/Plan   Discuss

## 2019-12-16 NOTE — LACTATION NOTE
LACTATION NOTE - MOTHER      Evaluation Type: Inpatient    Problems identified  Problems identified: Knowledge deficit    Maternal history  Other/comment: plugged ducts, engorgement with first    Breastfeeding goal  Breastfeeding goal: To maintain breast m

## 2019-12-18 ENCOUNTER — NURSE TRIAGE (OUTPATIENT)
Dept: INTERNAL MEDICINE CLINIC | Facility: CLINIC | Age: 34
End: 2019-12-18

## 2019-12-18 ENCOUNTER — OFFICE VISIT (OUTPATIENT)
Dept: INTERNAL MEDICINE CLINIC | Facility: CLINIC | Age: 34
End: 2019-12-18
Payer: COMMERCIAL

## 2019-12-18 VITALS
BODY MASS INDEX: 29.67 KG/M2 | HEIGHT: 64 IN | OXYGEN SATURATION: 97 % | TEMPERATURE: 98 F | WEIGHT: 173.81 LBS | HEART RATE: 76 BPM | SYSTOLIC BLOOD PRESSURE: 115 MMHG | DIASTOLIC BLOOD PRESSURE: 80 MMHG

## 2019-12-18 DIAGNOSIS — T88.1XXA LOCAL REACTION TO IMMUNIZATION, INITIAL ENCOUNTER: Primary | ICD-10-CM

## 2019-12-18 PROCEDURE — 99213 OFFICE O/P EST LOW 20 MIN: CPT | Performed by: INTERNAL MEDICINE

## 2019-12-18 NOTE — PROGRESS NOTES
HPI:    Patient ID: Maureen Adair is a 37year old female. HPI She is here   complainig of vaccine  Reaction. She had a baby last week.    She got her varicella  Vaccine while she was at the hospital  And today she noticed redness and itchiness on the suni Oral Tab Take 200 mg by mouth every 6 (six) hours as needed for Pain.        Allergies:  Adhesive Tape               Comment:Other reaction(s): ADHESIVE BANDAGE  Latex                       Comment:Other reaction(s): Rash    HISTORY:  Past Medical History: and mucous membranes are normal. Mucous membranes are not cyanotic. No oropharyngeal exudate, posterior oropharyngeal edema or posterior oropharyngeal erythema. Eyes: Pupils are equal, round, and reactive to light.  Conjunctivae and EOM are normal. Right Visit:  Requested Prescriptions      No prescriptions requested or ordered in this encounter       Imaging & Referrals:  None        #[id number]

## 2019-12-18 NOTE — TELEPHONE ENCOUNTER
Action Requested: Summary for Provider     []  Critical Lab, Recommendations Needed  [] Need Additional Advice  []   FYI    []   Need Orders  [] Need Medications Sent to Pharmacy  []  Other     SUMMARY: Pt stated that on Sunday she received the Varicela on

## 2019-12-26 ENCOUNTER — NURSE ONLY (OUTPATIENT)
Dept: LACTATION | Facility: HOSPITAL | Age: 34
End: 2019-12-26
Attending: OBSTETRICS & GYNECOLOGY
Payer: COMMERCIAL

## 2019-12-26 PROCEDURE — 99212 OFFICE O/P EST SF 10 MIN: CPT

## 2019-12-26 NOTE — PROGRESS NOTES
Mom is here today with infant due to nipple pain and nipple trauma which is healing. Also, mom has been having some trouble with engorgement, breast pain, and low grade fever for one day. OB  r/o mastitis. No s/s of mastitis today.  We discussed that the

## 2019-12-31 ENCOUNTER — NURSE ONLY (OUTPATIENT)
Dept: LACTATION | Facility: HOSPITAL | Age: 34
End: 2019-12-31
Payer: COMMERCIAL

## 2019-12-31 PROCEDURE — 99212 OFFICE O/P EST SF 10 MIN: CPT

## 2019-12-31 NOTE — PROGRESS NOTES
Mom is here with infant today for a follow up weight check after exclusive breastfeeding for the last four days.  Infant gained four ounces in four days and during feeding assessment, infant increased the amount of EBM that she took during the feeding which

## 2020-01-28 ENCOUNTER — OFFICE VISIT (OUTPATIENT)
Dept: INTERNAL MEDICINE CLINIC | Facility: CLINIC | Age: 35
End: 2020-01-28
Payer: COMMERCIAL

## 2020-01-28 VITALS
BODY MASS INDEX: 26.98 KG/M2 | WEIGHT: 158 LBS | TEMPERATURE: 98 F | RESPIRATION RATE: 18 BRPM | DIASTOLIC BLOOD PRESSURE: 81 MMHG | HEIGHT: 64 IN | SYSTOLIC BLOOD PRESSURE: 113 MMHG | HEART RATE: 81 BPM

## 2020-01-28 DIAGNOSIS — Z23 NEED FOR VACCINATION: Primary | ICD-10-CM

## 2020-01-28 DIAGNOSIS — D22.9 SKIN MOLE: ICD-10-CM

## 2020-01-28 PROBLEM — Z34.90 PREGNANCY (HCC): Status: RESOLVED | Noted: 2017-01-15 | Resolved: 2020-01-28

## 2020-01-28 PROBLEM — O09.529 ADVANCED MATERNAL AGE IN MULTIGRAVIDA (HCC): Status: RESOLVED | Noted: 2019-12-14 | Resolved: 2020-01-28

## 2020-01-28 PROBLEM — O09.529 ADVANCED MATERNAL AGE IN MULTIGRAVIDA: Status: RESOLVED | Noted: 2019-12-14 | Resolved: 2020-01-28

## 2020-01-28 PROBLEM — Z34.90 PREGNANCY: Status: RESOLVED | Noted: 2017-01-15 | Resolved: 2020-01-28

## 2020-01-28 PROCEDURE — 99395 PREV VISIT EST AGE 18-39: CPT | Performed by: INTERNAL MEDICINE

## 2020-01-28 NOTE — PROGRESS NOTES
HPI:   Pernell Diane is a 37year old female who presents for a complete physical exam. She is 6 weeks postpartum   She was seen by ob and everything was ok   She needs her second dose of varicella  Wt Readings from Last 3 Encounters:  01/28/20 : 158 lb (71. Negative Eye pain and vision changes. Respiratory Negative Cough, dyspnea and wheezing. Cardio Negative Chest pain, claudication, edema and irregular heartbeat/palpitations.    GI Negative Abdominal pain, blood in stool, constipation, diarrhea, heartbur Overview - Normal. No swelling or deformities.    Skin Normal Inspection - Normal.   Neurological Normal Memory - Normal. Sensory - Normal. Motor - Normal. Balance & gait - Normal.   Psychiatric Normal Orientation - Oriented to time, place, person & situati

## 2020-02-10 ENCOUNTER — NURSE ONLY (OUTPATIENT)
Dept: INTERNAL MEDICINE CLINIC | Facility: CLINIC | Age: 35
End: 2020-02-10
Payer: COMMERCIAL

## 2020-02-10 DIAGNOSIS — Z23 NEED FOR VARICELLA VACCINE: Primary | ICD-10-CM

## 2020-02-10 PROCEDURE — 90716 VAR VACCINE LIVE SUBQ: CPT | Performed by: INTERNAL MEDICINE

## 2020-02-10 PROCEDURE — 90471 IMMUNIZATION ADMIN: CPT | Performed by: INTERNAL MEDICINE

## 2020-02-10 NOTE — PROGRESS NOTES
Patient presents to office for second dose or varicella. When reviewing patient's chart noticed patient was seen for reaction to first varicella vaccine. Reached out to patient's PCP via [employer] message to advise.  Per PCP inform patient may have similar react

## 2020-02-18 ENCOUNTER — OFFICE VISIT (OUTPATIENT)
Dept: INTERNAL MEDICINE CLINIC | Facility: CLINIC | Age: 35
End: 2020-02-18
Payer: COMMERCIAL

## 2020-02-18 VITALS
HEART RATE: 81 BPM | DIASTOLIC BLOOD PRESSURE: 80 MMHG | SYSTOLIC BLOOD PRESSURE: 115 MMHG | BODY MASS INDEX: 26.63 KG/M2 | HEIGHT: 64 IN | OXYGEN SATURATION: 97 % | TEMPERATURE: 98 F | WEIGHT: 156 LBS

## 2020-02-18 DIAGNOSIS — J01.11 ACUTE RECURRENT FRONTAL SINUSITIS: Primary | ICD-10-CM

## 2020-02-18 PROCEDURE — 99213 OFFICE O/P EST LOW 20 MIN: CPT | Performed by: INTERNAL MEDICINE

## 2020-02-18 RX ORDER — FLUTICASONE PROPIONATE 50 MCG
2 SPRAY, SUSPENSION (ML) NASAL DAILY
Qty: 1 INHALER | Refills: 0 | Status: SHIPPED | OUTPATIENT
Start: 2020-02-18

## 2020-02-18 RX ORDER — AMOXICILLIN 875 MG/1
875 TABLET, COATED ORAL 2 TIMES DAILY
Qty: 14 TABLET | Refills: 0 | Status: SHIPPED | OUTPATIENT
Start: 2020-02-18 | End: 2021-02-13 | Stop reason: ALTCHOICE

## 2020-02-18 NOTE — PATIENT INSTRUCTIONS
Acute recurrent frontal sinusitis  (primary encounter diagnosis) will start her on amoxicillin advised her to take with food, drink more fluids, Flonase nasal spray, steam can help with congestion, Tylenol as needed if not better follow-up

## 2020-02-18 NOTE — PROGRESS NOTES
HPI:    Patient ID: Antonio Miner is a 37year old female. HPI she is here today complaining of congestion and cold symptoms. According to her symptoms started initially in January  Lasted for few days and then resolved.  According to her beginning of Feb adenopathy. Does not bruise/bleed easily. Psychiatric/Behavioral: Negative for agitation, behavioral problems, confusion, hallucinations and sleep disturbance. The patient is not nervous/anxious.           Current Outpatient Medications   Medication Sig D and time. She appears well-developed and well-nourished. No distress. HENT:   Head: Normocephalic and atraumatic. Right Ear: Tympanic membrane and external ear normal. No drainage or tenderness. No mastoid tenderness.  Tympanic membrane is not erythemat reflexes. No cranial nerve deficit. She exhibits normal muscle tone. Coordination normal.   Skin: Skin is warm, dry and intact. No rash noted. No cyanosis or erythema. Nails show no clubbing. Psychiatric: She has a normal mood and affect.  Her behavior is

## 2020-06-09 ENCOUNTER — TELEMEDICINE (OUTPATIENT)
Dept: INTERNAL MEDICINE CLINIC | Facility: CLINIC | Age: 35
End: 2020-06-09

## 2020-06-09 ENCOUNTER — TELEPHONE (OUTPATIENT)
Dept: INTERNAL MEDICINE CLINIC | Facility: CLINIC | Age: 35
End: 2020-06-09

## 2020-06-09 ENCOUNTER — PATIENT MESSAGE (OUTPATIENT)
Dept: INTERNAL MEDICINE CLINIC | Facility: CLINIC | Age: 35
End: 2020-06-09

## 2020-06-09 DIAGNOSIS — R53.83 FATIGUE, UNSPECIFIED TYPE: Primary | ICD-10-CM

## 2020-06-09 PROCEDURE — 99213 OFFICE O/P EST LOW 20 MIN: CPT | Performed by: INTERNAL MEDICINE

## 2020-06-09 NOTE — TELEPHONE ENCOUNTER
Patient calling reports having allergy symptoms as below  in her [employer] message     Also has right nostril \"stuffinees' and now has an oral cold sore ; she is currently breastfeeding   Has been using Flonase with some relief,  Is \" exhausted from not re

## 2020-06-09 NOTE — PROGRESS NOTES
This is a telemedicine visit with live, interactive video and audio. Patient understands and accepts financial responsibility for any deductible, co-insurance and/or co-pays associated with this service.     SUBJECTIVE   Fatigue  cold symptoms   [facility] Refill   • amoxicillin 875 MG Oral Tab Take 1 tablet (875 mg total) by mouth 2 (two) times daily. 14 tablet 0   • Fluticasone Propionate 50 MCG/ACT Nasal Suspension 2 sprays by Nasal route daily. 1 Inhaler 0   • Docusate Sodium (COLACE OR) Take by mouth. CBC W Differential W Platelet [E]      Vitamin B12 [E]      Meds This Visit:  Requested Prescriptions      No prescriptions requested or ordered in this encounter       Imaging & Referrals:  None       #[id number]

## 2020-06-29 ENCOUNTER — LAB ENCOUNTER (OUTPATIENT)
Dept: LAB | Facility: HOSPITAL | Age: 35
End: 2020-06-29
Attending: INTERNAL MEDICINE
Payer: COMMERCIAL

## 2020-06-29 DIAGNOSIS — R53.83 FATIGUE, UNSPECIFIED TYPE: ICD-10-CM

## 2020-06-29 LAB
BASOPHILS # BLD AUTO: 0.04 X10(3) UL (ref 0–0.2)
BASOPHILS NFR BLD AUTO: 0.6 %
DEPRECATED RDW RBC AUTO: 38.5 FL (ref 35.1–46.3)
EOSINOPHIL # BLD AUTO: 0.07 X10(3) UL (ref 0–0.7)
EOSINOPHIL NFR BLD AUTO: 1 %
ERYTHROCYTE [DISTWIDTH] IN BLOOD BY AUTOMATED COUNT: 12 % (ref 11–15)
HCT VFR BLD AUTO: 40.7 % (ref 35–48)
HGB BLD-MCNC: 13.9 G/DL (ref 12–16)
IMM GRANULOCYTES # BLD AUTO: 0.01 X10(3) UL (ref 0–1)
IMM GRANULOCYTES NFR BLD: 0.1 %
LYMPHOCYTES # BLD AUTO: 3.38 X10(3) UL (ref 1–4)
LYMPHOCYTES NFR BLD AUTO: 49.1 %
MCH RBC QN AUTO: 30 PG (ref 26–34)
MCHC RBC AUTO-ENTMCNC: 34.2 G/DL (ref 31–37)
MCV RBC AUTO: 87.9 FL (ref 80–100)
MONOCYTES # BLD AUTO: 0.5 X10(3) UL (ref 0.1–1)
MONOCYTES NFR BLD AUTO: 7.3 %
NEUTROPHILS # BLD AUTO: 2.88 X10 (3) UL (ref 1.5–7.7)
NEUTROPHILS # BLD AUTO: 2.88 X10(3) UL (ref 1.5–7.7)
NEUTROPHILS NFR BLD AUTO: 41.9 %
PLATELET # BLD AUTO: 350 10(3)UL (ref 150–450)
RBC # BLD AUTO: 4.63 X10(6)UL (ref 3.8–5.3)
VIT B12 SERPL-MCNC: 1125 PG/ML (ref 193–986)
WBC # BLD AUTO: 6.9 X10(3) UL (ref 4–11)

## 2020-06-29 PROCEDURE — 82607 VITAMIN B-12: CPT

## 2020-06-29 PROCEDURE — 85025 COMPLETE CBC W/AUTO DIFF WBC: CPT

## 2020-06-29 PROCEDURE — 36415 COLL VENOUS BLD VENIPUNCTURE: CPT

## 2020-08-27 ENCOUNTER — TELEPHONE (OUTPATIENT)
Dept: INTERNAL MEDICINE CLINIC | Facility: CLINIC | Age: 35
End: 2020-08-27

## 2020-08-27 ENCOUNTER — PATIENT MESSAGE (OUTPATIENT)
Dept: INTERNAL MEDICINE CLINIC | Facility: CLINIC | Age: 35
End: 2020-08-27

## 2020-08-27 NOTE — TELEPHONE ENCOUNTER
I will suggest you to use tylenol as needed for headache , drink more fluids , steam can help with congestion, sicne you are breastfeeding I will not recommend anything more.  You can use flonase nasal spray in the morning , can help too

## 2020-08-27 NOTE — TELEPHONE ENCOUNTER
----- Message from Mj Alamo sent at 8/27/2020  6:50 AM CDT -----  Regarding: Other  Contact: [phone]  Fadi Vasques,    My family has a head cold; my eldest son was tested and confirmed NOT Covid but I wonder if you could please confirm what medications I

## 2020-08-27 NOTE — TELEPHONE ENCOUNTER
From: Joo Taveras  To: Sharda Murguia MD  Sent: 8/27/2020 6:50 AM CDT  Subject: Other    Hi Dr,    My family has a head cold; my eldest son was tested and confirmed NOT Covid but I wonder if you could please confirm what medications I can take that are saf

## 2020-10-21 ENCOUNTER — MED REC SCAN ONLY (OUTPATIENT)
Dept: INTERNAL MEDICINE CLINIC | Facility: CLINIC | Age: 35
End: 2020-10-21

## 2021-02-13 ENCOUNTER — OFFICE VISIT (OUTPATIENT)
Dept: INTERNAL MEDICINE CLINIC | Facility: CLINIC | Age: 36
End: 2021-02-13
Payer: COMMERCIAL

## 2021-02-13 VITALS
HEIGHT: 64 IN | HEART RATE: 73 BPM | BODY MASS INDEX: 32.1 KG/M2 | WEIGHT: 188 LBS | DIASTOLIC BLOOD PRESSURE: 88 MMHG | SYSTOLIC BLOOD PRESSURE: 137 MMHG

## 2021-02-13 DIAGNOSIS — Z00.00 ANNUAL PHYSICAL EXAM: Primary | ICD-10-CM

## 2021-02-13 LAB
ALBUMIN SERPL-MCNC: 3.7 G/DL (ref 3.4–5)
ALBUMIN/GLOB SERPL: 0.9 {RATIO} (ref 1–2)
ALP LIVER SERPL-CCNC: 114 U/L
ALT SERPL-CCNC: 35 U/L
ANION GAP SERPL CALC-SCNC: 2 MMOL/L (ref 0–18)
AST SERPL-CCNC: 18 U/L (ref 15–37)
BASOPHILS # BLD AUTO: 0.06 X10(3) UL (ref 0–0.2)
BASOPHILS NFR BLD AUTO: 0.7 %
BILIRUB SERPL-MCNC: 0.5 MG/DL (ref 0.1–2)
BUN BLD-MCNC: 12 MG/DL (ref 7–18)
BUN/CREAT SERPL: 14.6 (ref 10–20)
CALCIUM BLD-MCNC: 9.4 MG/DL (ref 8.5–10.1)
CHLORIDE SERPL-SCNC: 106 MMOL/L (ref 98–112)
CHOLEST SMN-MCNC: 255 MG/DL (ref ?–200)
CO2 SERPL-SCNC: 29 MMOL/L (ref 21–32)
CREAT BLD-MCNC: 0.82 MG/DL
DEPRECATED RDW RBC AUTO: 39 FL (ref 35.1–46.3)
EOSINOPHIL # BLD AUTO: 0.1 X10(3) UL (ref 0–0.7)
EOSINOPHIL NFR BLD AUTO: 1.2 %
ERYTHROCYTE [DISTWIDTH] IN BLOOD BY AUTOMATED COUNT: 11.9 % (ref 11–15)
GLOBULIN PLAS-MCNC: 4.3 G/DL (ref 2.8–4.4)
GLUCOSE BLD-MCNC: 79 MG/DL (ref 70–99)
HCT VFR BLD AUTO: 43.3 %
HDLC SERPL-MCNC: 53 MG/DL (ref 40–59)
HGB BLD-MCNC: 14.5 G/DL
IMM GRANULOCYTES # BLD AUTO: 0.02 X10(3) UL (ref 0–1)
IMM GRANULOCYTES NFR BLD: 0.2 %
LDLC SERPL CALC-MCNC: 171 MG/DL (ref ?–100)
LYMPHOCYTES # BLD AUTO: 2.84 X10(3) UL (ref 1–4)
LYMPHOCYTES NFR BLD AUTO: 35.1 %
M PROTEIN MFR SERPL ELPH: 8 G/DL (ref 6.4–8.2)
MCH RBC QN AUTO: 29.9 PG (ref 26–34)
MCHC RBC AUTO-ENTMCNC: 33.5 G/DL (ref 31–37)
MCV RBC AUTO: 89.3 FL
MONOCYTES # BLD AUTO: 0.59 X10(3) UL (ref 0.1–1)
MONOCYTES NFR BLD AUTO: 7.3 %
NEUTROPHILS # BLD AUTO: 4.48 X10 (3) UL (ref 1.5–7.7)
NEUTROPHILS # BLD AUTO: 4.48 X10(3) UL (ref 1.5–7.7)
NEUTROPHILS NFR BLD AUTO: 55.5 %
NONHDLC SERPL-MCNC: 202 MG/DL (ref ?–130)
OSMOLALITY SERPL CALC.SUM OF ELEC: 283 MOSM/KG (ref 275–295)
PATIENT FASTING Y/N/NP: YES
PATIENT FASTING Y/N/NP: YES
PLATELET # BLD AUTO: 336 10(3)UL (ref 150–450)
POTASSIUM SERPL-SCNC: 4.1 MMOL/L (ref 3.5–5.1)
RBC # BLD AUTO: 4.85 X10(6)UL
SODIUM SERPL-SCNC: 137 MMOL/L (ref 136–145)
TRIGL SERPL-MCNC: 155 MG/DL (ref 30–149)
TSI SER-ACNC: 1.92 MIU/ML (ref 0.36–3.74)
VLDLC SERPL CALC-MCNC: 31 MG/DL (ref 0–30)
WBC # BLD AUTO: 8.1 X10(3) UL (ref 4–11)

## 2021-02-13 PROCEDURE — 99395 PREV VISIT EST AGE 18-39: CPT | Performed by: INTERNAL MEDICINE

## 2021-02-13 PROCEDURE — 3008F BODY MASS INDEX DOCD: CPT | Performed by: INTERNAL MEDICINE

## 2021-02-13 PROCEDURE — 3079F DIAST BP 80-89 MM HG: CPT | Performed by: INTERNAL MEDICINE

## 2021-02-13 PROCEDURE — 3075F SYST BP GE 130 - 139MM HG: CPT | Performed by: INTERNAL MEDICINE

## 2021-02-13 RX ORDER — CITALOPRAM 10 MG/1
10 TABLET ORAL DAILY
Qty: 90 TABLET | Refills: 0 | Status: SHIPPED | OUTPATIENT
Start: 2021-02-13 | End: 2021-03-30

## 2021-02-13 NOTE — PROGRESS NOTES
HPI:   Michelle August is a 38year old female who presents for a complete physical exam. Her period is heavy , regular . She stopped breast-feeding since January. She states that anxiety is much better.   She is following with therapist since August.  She is Smoker      Smokeless tobacco: Never Used    Alcohol use: Not Currently      Comment: occasionally    Drug use: No    Exercise: none. Diet: doesn't watch     REVIEW OF SYSTEMS:     Constitutional Negative Chills, fatigue and fever.    ENMT Negative Hearing performs self breast exam.   Respiratory Normal Auscultation - Normal, no wheezes or rales   Cardiovascular Normal Regular rate and rhythm. No murmurs, gallops, or rubs   Vascular Normal Pulses - Dorsalis pedis: Normal. Bruits - Carotids: Absent.     Extrem

## 2021-02-19 ENCOUNTER — TELEPHONE (OUTPATIENT)
Dept: INTERNAL MEDICINE CLINIC | Facility: CLINIC | Age: 36
End: 2021-02-19

## 2021-02-19 NOTE — TELEPHONE ENCOUNTER
Patient states that  prescribed citalopram medication to her but, would like to know if it is ok to take have a glass of wine while on the medication.      Current Outpatient Medications   Medication Sig Dispense Refill   • Citalopram Hydrobromide 1

## 2021-03-11 ENCOUNTER — NURSE TRIAGE (OUTPATIENT)
Dept: INTERNAL MEDICINE CLINIC | Facility: CLINIC | Age: 36
End: 2021-03-11

## 2021-03-11 ENCOUNTER — OFFICE VISIT (OUTPATIENT)
Dept: INTERNAL MEDICINE CLINIC | Facility: CLINIC | Age: 36
End: 2021-03-11
Payer: COMMERCIAL

## 2021-03-11 VITALS
BODY MASS INDEX: 31.76 KG/M2 | DIASTOLIC BLOOD PRESSURE: 76 MMHG | HEIGHT: 64 IN | HEART RATE: 84 BPM | SYSTOLIC BLOOD PRESSURE: 118 MMHG | WEIGHT: 186 LBS

## 2021-03-11 DIAGNOSIS — R10.32 LEFT LOWER QUADRANT PAIN: Primary | ICD-10-CM

## 2021-03-11 DIAGNOSIS — N39.0 URINARY TRACT INFECTION WITHOUT HEMATURIA, SITE UNSPECIFIED: ICD-10-CM

## 2021-03-11 PROCEDURE — 3078F DIAST BP <80 MM HG: CPT | Performed by: INTERNAL MEDICINE

## 2021-03-11 PROCEDURE — 3074F SYST BP LT 130 MM HG: CPT | Performed by: INTERNAL MEDICINE

## 2021-03-11 PROCEDURE — 3008F BODY MASS INDEX DOCD: CPT | Performed by: INTERNAL MEDICINE

## 2021-03-11 PROCEDURE — 99213 OFFICE O/P EST LOW 20 MIN: CPT | Performed by: INTERNAL MEDICINE

## 2021-03-11 NOTE — PROGRESS NOTES
HPI/Subjective:     Patient ID: Michelle August is a 38year old female. HPI    She came in today complaining of left lower quadrant abdominal pain radiating to her groin.   According to the patient her symptoms started yesterday as she felt some shooting pa behavioral problems, confusion, hallucinations and sleep disturbance. The patient is not nervous/anxious.       Current Outpatient Medications   Medication Sig Dispense Refill   • Citalopram Hydrobromide 10 MG Oral Tab Take 1 tablet (10 mg total) by mouth d tenderness. No mastoid tenderness. Tympanic membrane is not erythematous. Left Ear: Tympanic membrane and external ear normal. No drainage or tenderness. No mastoid tenderness.       Nose: Nose normal.      Right Sinus: No maxillary sinus tenderness or no clubbing. Neurological:      Mental Status: She is alert and oriented to person, place, and time. Cranial Nerves: No cranial nerve deficit. Motor: No abnormal muscle tone.       Coordination: Coordination normal.      Deep Tendon Reflexes: Re

## 2021-03-22 ENCOUNTER — TELEPHONE (OUTPATIENT)
Dept: INTERNAL MEDICINE CLINIC | Facility: CLINIC | Age: 36
End: 2021-03-22

## 2021-03-22 NOTE — TELEPHONE ENCOUNTER
Sravan Jack pt stated that she was in to see you on 2/13/2021 and it seems her discomfort was due to her menstrual cycle. Pt stated that she continues to have the diarrhea and she strongly feels it is due to the Citalopram Hydrobromide 10 MG Oral Tab.  Pt stat

## 2021-03-22 NOTE — TELEPHONE ENCOUNTER
I will suggested to hold citalopram for 1 week and then will see if that makes any difference.   If she continues to have same symptoms that she will need to follow-up with GI I

## 2021-03-23 NOTE — TELEPHONE ENCOUNTER
I called the patient and notified her of Dr. Leah Montejo of care below. Patient will stop citalopram for 1 week and then call back in 1 week with a condition update for next steps in plan of care.      If she improves she will ask for a different medicatio

## 2021-03-30 NOTE — TELEPHONE ENCOUNTER
If she wants to try different antidepressant I can start her on Effexor. She will take once a day. One episode of diarrhea should not be concerning it could be related to anything that she ate.   Since her symptoms improved after stopping citalopram I rosemarie

## 2021-03-30 NOTE — TELEPHONE ENCOUNTER
Patient returned call with condition update. States that since stopping the citalopram her symptoms of abdominal discomfort, bloating and diarrhea have resolved.   She did have 1 episode of diarrhea Sunday night without cramping, fever, body aches or chill

## 2021-03-30 NOTE — TELEPHONE ENCOUNTER
Spoke with pt,  verified  Pt informed of MD recommendation, pt stated understanding. Pt was informed gen effexor was sent to [facility], advised to f/u with them. She stated understanding.

## 2021-06-04 ENCOUNTER — NURSE TRIAGE (OUTPATIENT)
Dept: INTERNAL MEDICINE CLINIC | Facility: CLINIC | Age: 36
End: 2021-06-04

## 2021-06-04 NOTE — TELEPHONE ENCOUNTER
Action Requested: Summary for Provider     []  Critical Lab, Recommendations Needed  [x] Need Additional Advice  []   FYI    []   Need Orders  [] Need Medications Sent to Pharmacy  []  Other     SUMMARY: Verified name and .   Patient was exposed to [facility]

## 2021-06-04 NOTE — TELEPHONE ENCOUNTER
Patient states that she has a few blisters forming but says that it is not severe at this time. She was advised of Dr. Geovanna Nelson note and agrees to continue with cortisone cream and Zyrtec and to take Benadryl only if very itchy.  She declines video visit tod

## 2021-06-04 NOTE — TELEPHONE ENCOUNTER
Pt calling back and asking if Dr Quinton Bueno can prescribe the \"tapering steroids\" as states \"the rash is very itchy and the topical steroid is only helping a little and temporarily. \"     Verified allergies and pharmacy on file.        Mentions is no longer t

## 2021-06-04 NOTE — TELEPHONE ENCOUNTER
How bad is the rash ? Any blisters ? Can take cortisone topical and zyrtec , no need for benadryl unless very itchii , but if it worse might need tapering steroids .  If she want I can see her today or video visit if she has concern otherwise continue with

## 2021-06-05 RX ORDER — METHYLPREDNISOLONE 4 MG/1
TABLET ORAL
Qty: 1 EACH | Refills: 0 | Status: SHIPPED | OUTPATIENT
Start: 2021-06-05 | End: 2021-09-20

## 2021-06-08 ENCOUNTER — OFFICE VISIT (OUTPATIENT)
Dept: INTEGRATIVE MEDICINE | Facility: CLINIC | Age: 36
End: 2021-06-08
Payer: COMMERCIAL

## 2021-06-08 VITALS
HEIGHT: 64 IN | HEART RATE: 76 BPM | OXYGEN SATURATION: 98 % | BODY MASS INDEX: 31.92 KG/M2 | SYSTOLIC BLOOD PRESSURE: 120 MMHG | WEIGHT: 187 LBS | DIASTOLIC BLOOD PRESSURE: 76 MMHG

## 2021-06-08 DIAGNOSIS — M54.9 UPPER BACK PAIN: ICD-10-CM

## 2021-06-08 DIAGNOSIS — G44.229 CHRONIC TENSION-TYPE HEADACHE, NOT INTRACTABLE: ICD-10-CM

## 2021-06-08 DIAGNOSIS — E66.9 OBESITY (BMI 30-39.9): Primary | ICD-10-CM

## 2021-06-08 DIAGNOSIS — F41.9 ANXIETY: ICD-10-CM

## 2021-06-08 DIAGNOSIS — K59.00 CONSTIPATION, UNSPECIFIED CONSTIPATION TYPE: ICD-10-CM

## 2021-06-08 DIAGNOSIS — I83.93 ASYMPTOMATIC VARICOSE VEINS OF BOTH LOWER EXTREMITIES: ICD-10-CM

## 2021-06-08 DIAGNOSIS — M54.2 NECK PAIN: ICD-10-CM

## 2021-06-08 DIAGNOSIS — T78.40XS ALLERGIC REACTION, SEQUELA: ICD-10-CM

## 2021-06-08 PROBLEM — E66.3 OVERWEIGHT: Status: RESOLVED | Noted: 2018-02-01 | Resolved: 2021-06-08

## 2021-06-08 PROBLEM — Z78.9 SUSCEPTIBLE VARICELLA: Status: RESOLVED | Noted: 2019-12-14 | Resolved: 2021-06-08

## 2021-06-08 PROBLEM — Z30.2 ENCOUNTER FOR VASECTOMY: Status: RESOLVED | Noted: 2021-03-25 | Resolved: 2021-06-08

## 2021-06-08 PROBLEM — Z30.2 ENCOUNTER FOR VASECTOMY: Status: ACTIVE | Noted: 2021-03-25

## 2021-06-08 PROBLEM — E66.3 OVERWEIGHT: Status: ACTIVE | Noted: 2018-02-01

## 2021-06-08 PROBLEM — Z86.59 HISTORY OF ANXIETY: Status: RESOLVED | Noted: 2019-12-14 | Resolved: 2021-06-08

## 2021-06-08 PROCEDURE — 3008F BODY MASS INDEX DOCD: CPT | Performed by: FAMILY MEDICINE

## 2021-06-08 PROCEDURE — 3074F SYST BP LT 130 MM HG: CPT | Performed by: FAMILY MEDICINE

## 2021-06-08 PROCEDURE — 99204 OFFICE O/P NEW MOD 45 MIN: CPT | Performed by: FAMILY MEDICINE

## 2021-06-08 PROCEDURE — 3078F DIAST BP <80 MM HG: CPT | Performed by: FAMILY MEDICINE

## 2021-06-08 RX ORDER — CETIRIZINE HYDROCHLORIDE 5 MG/1
5 TABLET ORAL DAILY
COMMUNITY

## 2021-06-08 NOTE — PATIENT INSTRUCTIONS
I have complete marizol in the body's ability to heal and transform. The products and items listed below (the “Products”)  and their claims have not been evaluated by the Food and Drug Administration.  Dietary products are not intended to treat, prevent, m circulation in the legs  Where: [email or web address]/[employer]Life/#/shop/from/?[email or web address]    To support varicose veins:  Beautiful Legs by [employer]  Why: to help reduce appearance and symptoms of varicose veins    Directions: 1 capsule daily  Where:  Wh

## 2021-06-08 NOTE — PROGRESS NOTES
Yojana Cheney is a 38year old female. Patient presents with:  New Patient  Integrative Medicine Consult: nutrition, weight problem, anxiety, back and neck issues, skin issues      HPI:     Has poison ivy. Very itchy, exposed 1 week ago. Still spreading.

## 2021-06-14 ENCOUNTER — TELEPHONE (OUTPATIENT)
Dept: INTERNAL MEDICINE CLINIC | Facility: CLINIC | Age: 36
End: 2021-06-14

## 2021-06-14 NOTE — TELEPHONE ENCOUNTER
Verified pt name and . Reviewed doctor's recommendations as noted below. Provided pt with referral information and transferred pt to derm office to schedule appt.

## 2021-06-14 NOTE — TELEPHONE ENCOUNTER
She needs to see dermatology - ASAP  I put referal , pls let her schedule. I have not see her in the office , she didn't want to come in.  She needs to see dermatology

## 2021-06-14 NOTE — TELEPHONE ENCOUNTER
Was prescribed steroids 6-5-2021 for poison ivy. Completed steroids Thursday 6-10-21. Exposed to poison ivy on memorial day weekend. Says the rash is still spreading with blisters. Applying aloe, hydrocortisone and calamine lotion.   Takes daily zyrtec a

## 2021-06-16 ENCOUNTER — OFFICE VISIT (OUTPATIENT)
Dept: DERMATOLOGY CLINIC | Facility: CLINIC | Age: 36
End: 2021-06-16
Payer: COMMERCIAL

## 2021-06-16 DIAGNOSIS — L23.7 POISON IVY: Primary | ICD-10-CM

## 2021-06-16 PROCEDURE — 99203 OFFICE O/P NEW LOW 30 MIN: CPT | Performed by: PHYSICIAN ASSISTANT

## 2021-06-16 RX ORDER — FLUOCINONIDE 0.5 MG/G
1 OINTMENT TOPICAL 2 TIMES DAILY
Qty: 60 G | Refills: 1 | Status: SHIPPED | OUTPATIENT
Start: 2021-06-16 | End: 2021-07-01

## 2021-06-16 NOTE — PROGRESS NOTES
HPI:    Patient ID: Natalia Clarke is a 38year old female. Patient presents with rash on both arms with draining and is spreading. Its also around the waist as well. Does itch. No pain. Did have blistering rash and has improved with medrol dose pack.  She and torso. Neurological:      Mental Status: She is alert and oriented to person, place, and time. ASSESSMENT/PLAN:   1.  Poison ivy  -After discussion with patient, advised the following:  -Told to use fluocinonide   -Educated pt to use 2

## 2021-06-30 ENCOUNTER — LAB ENCOUNTER (OUTPATIENT)
Dept: LAB | Age: 36
End: 2021-06-30
Attending: FAMILY MEDICINE
Payer: COMMERCIAL

## 2021-06-30 ENCOUNTER — OFFICE VISIT (OUTPATIENT)
Dept: DERMATOLOGY CLINIC | Facility: CLINIC | Age: 36
End: 2021-06-30
Payer: COMMERCIAL

## 2021-06-30 DIAGNOSIS — E66.9 OBESITY (BMI 30-39.9): ICD-10-CM

## 2021-06-30 DIAGNOSIS — L23.7 POISON IVY: Primary | ICD-10-CM

## 2021-06-30 DIAGNOSIS — I83.93 ASYMPTOMATIC VARICOSE VEINS OF BOTH LOWER EXTREMITIES: ICD-10-CM

## 2021-06-30 LAB
DHEA-S SERPL-MCNC: 123.2 UG/DL
VIT B12 SERPL-MCNC: 840 PG/ML (ref 193–986)

## 2021-06-30 PROCEDURE — 99213 OFFICE O/P EST LOW 20 MIN: CPT | Performed by: PHYSICIAN ASSISTANT

## 2021-06-30 PROCEDURE — 86628 CANDIDA ANTIBODY: CPT

## 2021-06-30 PROCEDURE — 82627 DEHYDROEPIANDROSTERONE: CPT

## 2021-06-30 PROCEDURE — 84140 ASSAY OF PREGNENOLONE: CPT

## 2021-06-30 PROCEDURE — 82306 VITAMIN D 25 HYDROXY: CPT

## 2021-06-30 PROCEDURE — 82607 VITAMIN B-12: CPT

## 2021-06-30 PROCEDURE — 36415 COLL VENOUS BLD VENIPUNCTURE: CPT

## 2021-06-30 PROCEDURE — 84207 ASSAY OF VITAMIN B-6: CPT

## 2021-06-30 NOTE — PROGRESS NOTES
HPI:    Patient ID: Danielle Pinon is a 38year old female. Patient presents for follow-up on rash. She was using flucinonide ointment and mupirocin with relief. She feels the rash has greatly improved. Still some areas of itching present. No draining.  No with patient, advised the following:  -Told to use fluocinonide 1 time per day for the next 2 weeks.   -To stop use of mupirocin.   -To avoid poison ivy.   -To call or follow-up with worsening symptoms or concerns.   -Pt was agreeable to plan and will comp

## 2021-07-01 ENCOUNTER — TELEPHONE (OUTPATIENT)
Dept: DERMATOLOGY CLINIC | Facility: CLINIC | Age: 36
End: 2021-07-01

## 2021-07-01 RX ORDER — FLUOCINONIDE 0.5 MG/G
1 OINTMENT TOPICAL DAILY
Qty: 60 G | Refills: 1 | Status: SHIPPED | OUTPATIENT
Start: 2021-07-01 | End: 2021-09-20

## 2021-07-01 NOTE — TELEPHONE ENCOUNTER
Patient called    States Fluocinonide 0.05 % External Ointment is not available for refill until next week. Was told to call office. States using for poison ivy.  Please call

## 2021-07-01 NOTE — TELEPHONE ENCOUNTER
Pharmacy requesting prescription clarification of areas that fluocinonide cream is used so another tube can be dispensed. Per pt she is using now on  arms and chest BID. Order pended.

## 2021-07-02 LAB — 25(OH)D3 SERPL-MCNC: 21.1 NG/ML (ref 30–100)

## 2021-07-03 LAB — VITAMIN B6: 28.8 NMOL/L

## 2021-07-04 LAB — PREGNENOLONE BY MS/MS, SERUM: 58 NG/DL

## 2021-07-06 LAB
CANDIDA ANTIBODY IGA: 1.58 EV
CANDIDA ANTIBODY IGG: 1.09 EV
CANDIDA ANTIBODY IGM: 1.2 EV

## 2021-07-25 ENCOUNTER — HOSPITAL ENCOUNTER (OUTPATIENT)
Age: 36
Discharge: HOME | End: 2021-07-25
Payer: COMMERCIAL

## 2021-07-25 VITALS
HEART RATE: 86 BPM | SYSTOLIC BLOOD PRESSURE: 137 MMHG | OXYGEN SATURATION: 98 % | HEIGHT: 64 IN | WEIGHT: 180 LBS | RESPIRATION RATE: 18 BRPM | DIASTOLIC BLOOD PRESSURE: 81 MMHG | TEMPERATURE: 97 F | BODY MASS INDEX: 30.73 KG/M2

## 2021-07-25 DIAGNOSIS — Z20.822 ENCOUNTER FOR LABORATORY TESTING FOR COVID-19 VIRUS: Primary | ICD-10-CM

## 2021-07-25 DIAGNOSIS — J06.9 VIRAL URI: ICD-10-CM

## 2021-07-25 LAB
S PYO AG THROAT QL: NEGATIVE
SARS-COV-2 RNA RESP QL NAA+PROBE: NOT DETECTED

## 2021-07-25 PROCEDURE — 99203 OFFICE O/P NEW LOW 30 MIN: CPT | Performed by: NURSE PRACTITIONER

## 2021-07-25 PROCEDURE — U0002 COVID-19 LAB TEST NON-CDC: HCPCS | Performed by: NURSE PRACTITIONER

## 2021-07-25 PROCEDURE — 87880 STREP A ASSAY W/OPTIC: CPT | Performed by: NURSE PRACTITIONER

## 2021-07-25 NOTE — ED PROVIDER NOTES
Patient Seen in: [facility]      History   Patient presents with:  Upper Respiratory Infection    Stated Complaint: Testing    HPI/Subjective: The history is provided by the patient.        Patient presents to the immediate care requesting complaint: Testing  Other systems are as noted in HPI. Constitutional and vital signs reviewed. All other systems reviewed and negative except as noted above.     Physical Exam     ED Triage Vitals [07/25/21 1102]   /81   Pulse 86   Resp 18   Te place, and time. Deep Tendon Reflexes: Reflexes are normal and symmetric. Psychiatric:         Behavior: Behavior normal.         Thought Content:  Thought content normal.         Judgment: Judgment normal.               ED Course     Labs Reviewed

## 2021-08-12 ENCOUNTER — HOSPITAL ENCOUNTER (OUTPATIENT)
Age: 36
Discharge: HOME | End: 2021-08-12
Payer: COMMERCIAL

## 2021-08-12 VITALS
OXYGEN SATURATION: 98 % | HEART RATE: 74 BPM | RESPIRATION RATE: 16 BRPM | WEIGHT: 185 LBS | DIASTOLIC BLOOD PRESSURE: 90 MMHG | SYSTOLIC BLOOD PRESSURE: 133 MMHG | BODY MASS INDEX: 31.58 KG/M2 | HEIGHT: 64 IN | TEMPERATURE: 97 F

## 2021-08-12 DIAGNOSIS — J06.9 UPPER RESPIRATORY TRACT INFECTION, UNSPECIFIED TYPE: ICD-10-CM

## 2021-08-12 DIAGNOSIS — Z20.822 CLOSE EXPOSURE TO 2019 NOVEL CORONAVIRUS: Primary | ICD-10-CM

## 2021-08-12 LAB
S PYO AG THROAT QL: NEGATIVE
SARS-COV-2 RNA RESP QL NAA+PROBE: NOT DETECTED

## 2021-08-12 PROCEDURE — 87880 STREP A ASSAY W/OPTIC: CPT | Performed by: NURSE PRACTITIONER

## 2021-08-12 PROCEDURE — U0002 COVID-19 LAB TEST NON-CDC: HCPCS | Performed by: NURSE PRACTITIONER

## 2021-08-12 PROCEDURE — 99213 OFFICE O/P EST LOW 20 MIN: CPT | Performed by: NURSE PRACTITIONER

## 2021-08-12 NOTE — ED PROVIDER NOTES
Patient Seen in: [facility]      History   Patient presents with:  Covid-19 Test: Cold, cough, sore throat, slight headache, fatigue - Entered by patient    Stated Complaint: Covid-19 Test - Cold, cough, sore throat, slight headache, fatigue ill-appearing. HENT:      Head: Normocephalic. Ears:      Comments: Bilateral effusion     Nose: Congestion present. Mouth/Throat:      Pharynx: Posterior oropharyngeal erythema present.    Eyes:      Extraocular Movements: Extraocular movements [phone]  Schedule an appointment as soon as possible for a visit   As needed    Jesus Doe, [facility] [place] [id number] [phone]    Schedule an appointment as soon as possible for a visit   As needed          Medications P

## 2021-08-19 ENCOUNTER — HOSPITAL ENCOUNTER (OUTPATIENT)
Age: 36
Discharge: HOME | End: 2021-08-19
Payer: COMMERCIAL

## 2021-08-19 VITALS
HEIGHT: 64 IN | TEMPERATURE: 97 F | WEIGHT: 185 LBS | RESPIRATION RATE: 16 BRPM | SYSTOLIC BLOOD PRESSURE: 128 MMHG | HEART RATE: 84 BPM | BODY MASS INDEX: 31.58 KG/M2 | DIASTOLIC BLOOD PRESSURE: 86 MMHG | OXYGEN SATURATION: 100 %

## 2021-08-19 DIAGNOSIS — Z20.822 ENCOUNTER FOR LABORATORY TESTING FOR COVID-19 VIRUS: Primary | ICD-10-CM

## 2021-08-19 LAB — SARS-COV-2 RNA RESP QL NAA+PROBE: NOT DETECTED

## 2021-08-19 PROCEDURE — U0002 COVID-19 LAB TEST NON-CDC: HCPCS | Performed by: NURSE PRACTITIONER

## 2021-08-19 PROCEDURE — 99212 OFFICE O/P EST SF 10 MIN: CPT | Performed by: NURSE PRACTITIONER

## 2021-08-19 NOTE — ED PROVIDER NOTES
Patient Seen in: [facility]      History   Patient presents with:  Covid-19 Test: Entered by patient  Cough/URI    Stated Complaint: Covid-19 Test    HPI/Subjective:   HPI    57-year-old female presents to the immediate care requesting Covid ED Course     Labs Reviewed   RAPID SARS-COV-2 BY PCR - Normal     All vital signs stable sats 100% on room air temperature 96.7 Covid test is negative              MDM      I have given the patient instructions regarding their diagnoses, expec

## 2021-08-19 NOTE — ED INITIAL ASSESSMENT (HPI)
Pt states that she would like a covid test for travel. Reports a cough and lingering nasal congestion.

## 2021-09-20 ENCOUNTER — OFFICE VISIT (OUTPATIENT)
Dept: FAMILY MEDICINE CLINIC | Facility: CLINIC | Age: 36
End: 2021-09-20
Payer: COMMERCIAL

## 2021-09-20 VITALS
BODY MASS INDEX: 31.76 KG/M2 | OXYGEN SATURATION: 98 % | WEIGHT: 186 LBS | HEART RATE: 88 BPM | SYSTOLIC BLOOD PRESSURE: 104 MMHG | DIASTOLIC BLOOD PRESSURE: 68 MMHG | HEIGHT: 64 IN

## 2021-09-20 DIAGNOSIS — M79.631 RIGHT FOREARM PAIN: ICD-10-CM

## 2021-09-20 DIAGNOSIS — M77.11 LATERAL EPICONDYLITIS OF RIGHT ELBOW: Primary | ICD-10-CM

## 2021-09-20 PROCEDURE — 3078F DIAST BP <80 MM HG: CPT | Performed by: NURSE PRACTITIONER

## 2021-09-20 PROCEDURE — 99213 OFFICE O/P EST LOW 20 MIN: CPT | Performed by: NURSE PRACTITIONER

## 2021-09-20 PROCEDURE — 3074F SYST BP LT 130 MM HG: CPT | Performed by: NURSE PRACTITIONER

## 2021-09-20 PROCEDURE — 3008F BODY MASS INDEX DOCD: CPT | Performed by: NURSE PRACTITIONER

## 2021-09-20 RX ORDER — NAPROXEN 500 MG/1
500 TABLET ORAL 2 TIMES DAILY WITH MEALS
Qty: 28 TABLET | Refills: 0 | Status: SHIPPED | OUTPATIENT
Start: 2021-09-20 | End: 2021-10-04

## 2021-09-20 NOTE — PROGRESS NOTES
Chief Complaint:   Patient presents with:  Pain: arm and hand pain from gardening back in july/aug      HPI:   This is a 38year old female coming in for pain in right forearm and BL hands.  Onset either 7/5 or 8/22, she did a lot of weeding in her garden w Sister    • Asthma Other         cousin   • Breast Cancer Paternal Aunt      Allergies:    Adhesive Tape               Comment:Other reaction(s): ADHESIVE BANDAGE  Latex                       Comment:Other reaction(s): Rash  Current Meds:  Current Outpatie Patient is alert, awake and oriented, well developed, well nourished. HEENT:  Head:  Normocephalic, atraumatic Eyes: EOMI, PERRLA, conjunctivae clear bilaterally, no eye discharge Ears: External normal, TM clear bilaterally.  Nose: patent, no nasal dischar Hamstring tightness of both lower extremities     Trigger point with back pain     Pelvic floor dysfunction     Anxiety     GERD (gastroesophageal reflux disease)     Hypercholesterolemia     Obesity      SUSANA Maldonado  9/20/2021  5:04 PM

## 2021-09-20 NOTE — PATIENT INSTRUCTIONS
RICE     Rest an injury, elevate it, and use ice and compression as directed. RICE stands for rest, ice, compression, and elevation. These can limit pain and swelling after an injury.  RICE may be recommended to help treat breaks (fractures), sprains, s or tingly  · Signs of infection. These include warmth in the skin, redness, drainage, or bad smell coming from the injured body part. Bhaskar last reviewed this educational content on 6/1/2018  © 4216-1427 [employer]. All rights reserved. You may need physical therapy. It may include stretching, range-of-motion, and strengthening exercises. These treatments help most cases. You may need surgery if your symptoms continue for 6 months despite treatment.    Home care  Follow these guidelines wh When to seek medical advice  Call your healthcare provider right away if any of these occur:  · Redness over the painful area  · Pain, stiffness,  or swelling at the elbow gets worse  · Any numbness or tingling in your arm, hands, or fingers  · Unexplain

## 2021-10-06 ENCOUNTER — PATIENT MESSAGE (OUTPATIENT)
Dept: FAMILY MEDICINE CLINIC | Facility: CLINIC | Age: 36
End: 2021-10-06

## 2021-10-06 DIAGNOSIS — M79.631 RIGHT FOREARM PAIN: ICD-10-CM

## 2021-10-06 DIAGNOSIS — M54.2 CHRONIC NECK PAIN: ICD-10-CM

## 2021-10-06 DIAGNOSIS — G89.29 CHRONIC NECK PAIN: ICD-10-CM

## 2021-10-06 DIAGNOSIS — M77.11 LATERAL EPICONDYLITIS OF RIGHT ELBOW: Primary | ICD-10-CM

## 2021-10-06 DIAGNOSIS — M54.50 ACUTE MIDLINE LOW BACK PAIN WITHOUT SCIATICA: ICD-10-CM

## 2021-10-11 ENCOUNTER — TELEMEDICINE (OUTPATIENT)
Dept: FAMILY MEDICINE CLINIC | Facility: CLINIC | Age: 36
End: 2021-10-11
Payer: COMMERCIAL

## 2021-10-11 DIAGNOSIS — M79.631 RIGHT FOREARM PAIN: ICD-10-CM

## 2021-10-11 DIAGNOSIS — G89.29 CHRONIC NECK PAIN: ICD-10-CM

## 2021-10-11 DIAGNOSIS — M77.11 LATERAL EPICONDYLITIS OF RIGHT ELBOW: Primary | ICD-10-CM

## 2021-10-11 DIAGNOSIS — M54.50 ACUTE MIDLINE LOW BACK PAIN WITHOUT SCIATICA: ICD-10-CM

## 2021-10-11 DIAGNOSIS — M54.2 CHRONIC NECK PAIN: ICD-10-CM

## 2021-10-11 PROCEDURE — 99213 OFFICE O/P EST LOW 20 MIN: CPT | Performed by: NURSE PRACTITIONER

## 2021-10-11 RX ORDER — NAPROXEN 500 MG/1
500 TABLET ORAL 2 TIMES DAILY WITH MEALS
Qty: 28 TABLET | Refills: 0 | Status: SHIPPED | OUTPATIENT
Start: 2021-10-11 | End: 2021-10-25

## 2021-10-11 NOTE — PATIENT INSTRUCTIONS
Back Exercises: Abdominal Lift Brace with Marching    The abdominal lift brace with march strengthens your lower abdominal muscles, helping you keep your pelvis and back stable:  · Lie on the floor with both knees bent.  Put your feet flat on the floor an slightly. · Hold for 5 seconds. Return to starting position. · Repeat 5 times. Bhaskar last reviewed this educational content on 3/1/2018  © 6235-2492 [employer]. All rights reserved.  This information is not intended as a substitute for You should feel comfortable and relaxed in this position. · Rest your right ankle on your left knee. · Place a towel behind your left thigh, and use it to pull the knee toward your chest. Feel the stretch in your buttocks. · Hold for 30 to 60 seconds.  R provider before starting an exercise program.    Bhaskar last reviewed this educational content on 3/1/2018  © 1732-2272 [employer]. All rights reserved. This information is not intended as a substitute for professional medical care.  Always your feet well apart. · Bend forward and touch the floor with the backs of your hands. Relax and let your body drop. · Hold for  20 seconds. Return to starting position.   · Repeat  2 times.   Note: If you've had back or hip surgery, talk with your health professional's instructions. Back Exercises: Seated Rotation    To start, sit in a chair with your feet flat on the floor. Shift your weight slightly forward to avoid rounding your back.  Relax, and keep your ears, shoulders, and hips aligned:  · Fol exercise again, this time lifting your arm to the side. Repeat 5 times. 3. Do the exercise again, this time lifting your arm backward, palm up. Repeat 5 times. Switch sides and do each exercise with the other arm.   Note: This exercise may not be advised Exercise    To start, sit in a chair with your feet flat on the floor. Shift your weight slightly forward so you don't round your back. Relax.  Keep your ears, shoulders, and hips aligned:  · Raise both of your shoulders as high as you can, as if you were t Then turn to the other side. · Repeat  5 times on each side. Note: Keep your shoulders on the floor. Don’t lift or tuck your chin as you turn your head. Bhaskar last reviewed this educational content on 7/1/2020  © 2501-3011 [employer]. level with your back. · Hold for 5 seconds. Repeat 5 to10 times. Bhaskar last reviewed this educational content on 3/1/2018  © 8433-5911 [employer]. All rights reserved.  This information is not intended as a substitute for professional me 6050-1233 The [facility]. All rights reserved. This information is not intended as a substitute for professional medical care. Always follow your healthcare professional's instructions.         Neck Exercises: Neck Rotation    To start, lie on yo epicondylitis? The condition most often occurs because of overuse.  This can be from any activity that repeatedly puts stress on the forearm extensor muscles or tendons and wrist. For instance, playing tennis, lifting weights, cutting meat, painting, and t tissue. Possible complications of lateral epicondylitis  If the tendons involved don’t heal properly, symptoms may return or get worse. To help prevent this, follow your treatment plan as directed.    When to call your healthcare provider  Call your health

## 2021-10-11 NOTE — TELEPHONE ENCOUNTER
Pt made appt with pt. Appointment not until November. Pt wondering if it is ok to wait or do you recommend another place.  Also concerns about when to start the RX since a delay in PT

## 2021-10-11 NOTE — TELEPHONE ENCOUNTER
From: Italo Carpenter  Sent: 10/11/2021 3:47 PM CDT  To: [facility] Clinical Staff  Subject: Arm Pain not Better    Dr Edie Medina,    Thanks again for your time today.  I called and made an appointment through the physical therapy in your building but they can’t f

## 2021-10-11 NOTE — PROGRESS NOTES
Due to the real risk of possible exposure to Coronavirus (CoV-2, COVID-19) in the office/medical building and recommendations for social distancing (key to mitigation/limiting the spread of the virus) a Virtual or Telemedicine visit over the phone was perf Coding/billing information is submitted for this visit based on complexity of care and/or time spent for the visit. HPI:  Patient presents with:   Follow - Up: R elbow pain, low back pain      Rigo Jeffrey is a 38year old female who calls for complaints reaction(s): ADHESIVE BANDAGE  Latex                       Comment:Other reaction(s): Rash  Current Outpatient Medications   Medication Sig Dispense Refill   • naproxen 500 MG Oral Tab Take 1 tablet (500 mg total) by mouth 2 (two) times daily with meals fo PHYSICAL THERAPY - INTERNAL  - naproxen 500 MG Oral Tab; Take 1 tablet (500 mg total) by mouth 2 (two) times daily with meals for 14 days. Dispense: 28 tablet;  Refill: 0        · Follow up with me 6-8 weeks    · Call and/or go to the ER if worsening sympt

## 2021-10-18 ENCOUNTER — HOSPITAL ENCOUNTER (OUTPATIENT)
Age: 36
Discharge: HOME | End: 2021-10-18
Payer: COMMERCIAL

## 2021-10-18 VITALS
TEMPERATURE: 97 F | OXYGEN SATURATION: 99 % | DIASTOLIC BLOOD PRESSURE: 89 MMHG | HEIGHT: 64 IN | RESPIRATION RATE: 16 BRPM | BODY MASS INDEX: 31.58 KG/M2 | WEIGHT: 185 LBS | SYSTOLIC BLOOD PRESSURE: 139 MMHG | HEART RATE: 77 BPM

## 2021-10-18 DIAGNOSIS — J02.9 SORE THROAT: Primary | ICD-10-CM

## 2021-10-18 PROCEDURE — 87880 STREP A ASSAY W/OPTIC: CPT | Performed by: NURSE PRACTITIONER

## 2021-10-18 PROCEDURE — U0002 COVID-19 LAB TEST NON-CDC: HCPCS | Performed by: NURSE PRACTITIONER

## 2021-10-18 PROCEDURE — 99213 OFFICE O/P EST LOW 20 MIN: CPT | Performed by: NURSE PRACTITIONER

## 2021-10-18 RX ORDER — OMEPRAZOLE 20 MG/1
20 CAPSULE, DELAYED RELEASE ORAL
COMMUNITY

## 2021-10-18 NOTE — ED PROVIDER NOTES
Patient Seen in: [facility]      History   Patient presents with:  Sore Throat: Entered by patient    Stated Complaint: Sore Throat    Subjective:   HPI    36yr old female here today with c/o sore throat that started yesterday.   Pt reports t Landmarks intact. No trismus. Mucous membranes moist.  No oral pallor. No oral vesicles. Neck: No cervical lymphadenopathy. Supple. No meningsmus. Heart: S1-S2. Regular rate and rhythm. Lungs: good inspiratory effort.  +air [address] [place] [address]  [phone]                Medications Prescribed:  Discharge Medication List as of 10/18/2021  8:43 AM

## 2021-10-20 ENCOUNTER — TELEPHONE (OUTPATIENT)
Dept: FAMILY MEDICINE CLINIC | Facility: CLINIC | Age: 36
End: 2021-10-20

## 2021-10-20 NOTE — TELEPHONE ENCOUNTER
Received PT Eval/POC from [facility]. Report placed on providers desk for signature. When returned, fax and send to scanning. Fax to [phone].

## 2021-11-05 ENCOUNTER — APPOINTMENT (OUTPATIENT)
Dept: PHYSICAL THERAPY | Age: 36
End: 2021-11-05
Attending: NURSE PRACTITIONER
Payer: COMMERCIAL

## 2021-11-09 ENCOUNTER — APPOINTMENT (OUTPATIENT)
Dept: PHYSICAL THERAPY | Age: 36
End: 2021-11-09
Attending: NURSE PRACTITIONER
Payer: COMMERCIAL

## 2021-11-12 ENCOUNTER — APPOINTMENT (OUTPATIENT)
Dept: PHYSICAL THERAPY | Age: 36
End: 2021-11-12
Attending: NURSE PRACTITIONER
Payer: COMMERCIAL

## 2021-11-16 ENCOUNTER — APPOINTMENT (OUTPATIENT)
Dept: PHYSICAL THERAPY | Age: 36
End: 2021-11-16
Attending: NURSE PRACTITIONER
Payer: COMMERCIAL

## 2021-11-19 ENCOUNTER — APPOINTMENT (OUTPATIENT)
Dept: PHYSICAL THERAPY | Age: 36
End: 2021-11-19
Attending: NURSE PRACTITIONER
Payer: COMMERCIAL

## 2021-11-22 ENCOUNTER — APPOINTMENT (OUTPATIENT)
Dept: PHYSICAL THERAPY | Age: 36
End: 2021-11-22
Attending: NURSE PRACTITIONER
Payer: COMMERCIAL

## 2021-11-26 ENCOUNTER — APPOINTMENT (OUTPATIENT)
Dept: PHYSICAL THERAPY | Age: 36
End: 2021-11-26
Attending: NURSE PRACTITIONER
Payer: COMMERCIAL

## 2021-11-29 ENCOUNTER — APPOINTMENT (OUTPATIENT)
Dept: PHYSICAL THERAPY | Age: 36
End: 2021-11-29
Attending: NURSE PRACTITIONER
Payer: COMMERCIAL

## 2021-12-01 ENCOUNTER — TELEPHONE (OUTPATIENT)
Dept: FAMILY MEDICINE CLINIC | Facility: CLINIC | Age: 36
End: 2021-12-01

## 2021-12-01 NOTE — TELEPHONE ENCOUNTER
Office received fax from [facility] 11/30/2021- Signature required. Will place on SUSANA Mcknight desk.

## 2021-12-03 ENCOUNTER — APPOINTMENT (OUTPATIENT)
Dept: PHYSICAL THERAPY | Age: 36
End: 2021-12-03
Attending: NURSE PRACTITIONER
Payer: COMMERCIAL

## 2021-12-16 ENCOUNTER — HOSPITAL ENCOUNTER (OUTPATIENT)
Age: 36
Discharge: HOME | End: 2021-12-16
Attending: EMERGENCY MEDICINE
Payer: COMMERCIAL

## 2021-12-16 VITALS
RESPIRATION RATE: 18 BRPM | TEMPERATURE: 98 F | OXYGEN SATURATION: 99 % | SYSTOLIC BLOOD PRESSURE: 149 MMHG | DIASTOLIC BLOOD PRESSURE: 94 MMHG | HEART RATE: 77 BPM

## 2021-12-16 DIAGNOSIS — J02.0 STREP PHARYNGITIS: ICD-10-CM

## 2021-12-16 DIAGNOSIS — Z20.822 ENCOUNTER FOR SCREENING LABORATORY TESTING FOR COVID-19 VIRUS: Primary | ICD-10-CM

## 2021-12-16 PROCEDURE — 99213 OFFICE O/P EST LOW 20 MIN: CPT | Performed by: EMERGENCY MEDICINE

## 2021-12-16 PROCEDURE — 87880 STREP A ASSAY W/OPTIC: CPT | Performed by: EMERGENCY MEDICINE

## 2021-12-16 PROCEDURE — U0002 COVID-19 LAB TEST NON-CDC: HCPCS | Performed by: EMERGENCY MEDICINE

## 2021-12-16 RX ORDER — PENICILLIN V POTASSIUM 500 MG/1
500 TABLET ORAL 2 TIMES DAILY
Qty: 20 TABLET | Refills: 0 | Status: SHIPPED | OUTPATIENT
Start: 2021-12-16 | End: 2021-12-26

## 2021-12-16 NOTE — ED INITIAL ASSESSMENT (HPI)
Pt c/o sore throat, runny nose, cough, hoarse voice, headache x4 days. States neg covid test yesterday and 3 days ago.

## 2021-12-16 NOTE — ED PROVIDER NOTES
Patient Seen in: [facility]      History   Patient presents with:  Sore Throat    Stated Complaint: sore throat    Subjective:   HPI    4 days of sore throat primarily with mild runny nose and cough. No documented fever.  Has had multiple neg rate and regular rhythm. Pulmonary:      Effort: Pulmonary effort is normal. No respiratory distress. Musculoskeletal:      Cervical back: Normal range of motion and neck supple. Lymphadenopathy:      Cervical: Cervical adenopathy present.    Skin:

## 2021-12-19 ENCOUNTER — HOSPITAL ENCOUNTER (OUTPATIENT)
Age: 36
Discharge: HOME | End: 2021-12-19
Payer: COMMERCIAL

## 2021-12-19 VITALS
TEMPERATURE: 98 F | HEIGHT: 64 IN | HEART RATE: 87 BPM | DIASTOLIC BLOOD PRESSURE: 89 MMHG | OXYGEN SATURATION: 97 % | BODY MASS INDEX: 30.73 KG/M2 | RESPIRATION RATE: 19 BRPM | WEIGHT: 180 LBS | SYSTOLIC BLOOD PRESSURE: 132 MMHG

## 2021-12-19 DIAGNOSIS — J06.9 UPPER RESPIRATORY TRACT INFECTION, UNSPECIFIED TYPE: ICD-10-CM

## 2021-12-19 DIAGNOSIS — Z11.52 ENCOUNTER FOR SCREENING FOR COVID-19: Primary | ICD-10-CM

## 2021-12-19 PROCEDURE — 99212 OFFICE O/P EST SF 10 MIN: CPT | Performed by: NURSE PRACTITIONER

## 2021-12-19 PROCEDURE — U0002 COVID-19 LAB TEST NON-CDC: HCPCS | Performed by: NURSE PRACTITIONER

## 2021-12-19 RX ORDER — BENZONATATE 100 MG/1
100 CAPSULE ORAL 3 TIMES DAILY PRN
Qty: 30 CAPSULE | Refills: 0 | Status: SHIPPED | OUTPATIENT
Start: 2021-12-19 | End: 2022-01-18

## 2021-12-19 NOTE — ED INITIAL ASSESSMENT (HPI)
Dx with Strep on Thursday, taking PCN. States throat is more painful now. Lost taste and smell this morning. Took COVID test at home and is negative.

## 2021-12-20 ENCOUNTER — TELEPHONE (OUTPATIENT)
Dept: FAMILY MEDICINE CLINIC | Facility: CLINIC | Age: 36
End: 2021-12-20

## 2021-12-20 ENCOUNTER — TELEMEDICINE (OUTPATIENT)
Dept: FAMILY MEDICINE CLINIC | Facility: CLINIC | Age: 36
End: 2021-12-20

## 2021-12-20 DIAGNOSIS — R51.9 ACUTE NONINTRACTABLE HEADACHE, UNSPECIFIED HEADACHE TYPE: ICD-10-CM

## 2021-12-20 DIAGNOSIS — J01.40 ACUTE NON-RECURRENT PANSINUSITIS: Primary | ICD-10-CM

## 2021-12-20 DIAGNOSIS — R09.81 SINUS CONGESTION: ICD-10-CM

## 2021-12-20 DIAGNOSIS — T88.7XXA SIDE EFFECT OF MEDICATION: ICD-10-CM

## 2021-12-20 DIAGNOSIS — R05.9 COUGH: ICD-10-CM

## 2021-12-20 DIAGNOSIS — R43.2 LOSS OF TASTE: ICD-10-CM

## 2021-12-20 PROCEDURE — 99213 OFFICE O/P EST LOW 20 MIN: CPT | Performed by: NURSE PRACTITIONER

## 2021-12-20 RX ORDER — AMOXICILLIN AND CLAVULANATE POTASSIUM 875; 125 MG/1; MG/1
1 TABLET, FILM COATED ORAL 2 TIMES DAILY
Qty: 20 TABLET | Refills: 0 | Status: SHIPPED | OUTPATIENT
Start: 2021-12-20 | End: 2021-12-30

## 2021-12-20 RX ORDER — FLUCONAZOLE 150 MG/1
150 TABLET ORAL ONCE
Qty: 1 TABLET | Refills: 0 | Status: SHIPPED | OUTPATIENT
Start: 2021-12-20 | End: 2021-12-20

## 2021-12-20 NOTE — PROGRESS NOTES
Due to the real risk of possible exposure to Coronavirus (CoV-2, COVID-19) in the office/medical building and recommendations for social distancing (key to mitigation/limiting the spread of the virus) a Virtual or Telemedicine visit over the phone was perf Coding/billing information is submitted for this visit based on complexity of care and/or time spent for the visit.     HPI:  Patient presents with:  Acute: sore throat, runny nose, cough      Jennifer Davis is a 43year old female who calls for complaints of: omeprazole 20 MG Oral Capsule Delayed Release Take 20 mg by mouth every morning before breakfast.     • Cetirizine HCl 5 MG Oral Tab Take 5 mg by mouth daily. • Sertraline HCl 50 MG Oral Tab Take 50 mg by mouth daily.      • Fluticasone Propionate 50 patient's parent if <19 y/o) indicates understanding of the above recommendations and agrees to the above plan. No orders of the defined types were placed in this encounter.       Meds & Refills for this Visit:  Requested Prescriptions     Signed Prescri

## 2021-12-20 NOTE — TELEPHONE ENCOUNTER
We do not yet have a confirmed COVID test, she just has suspicious symptoms. Agree that simply sleeping in separate rooms would not be helpful if they are around one another during the day.  We are not planning to retest for COVID so no need for formal quar

## 2021-12-20 NOTE — TELEPHONE ENCOUNTER
Received vm from pt, forgot to ask [facility] if spouse should sleep in a different room if any concerns having covid. Routed to APRN for advice.

## 2021-12-20 NOTE — TELEPHONE ENCOUNTER
Received vm from pt, questions re medications she is on. I spoke with pt, feeling a little bit better today. Wants to touch base with [facility] as she has been going to  and is on a lot of medications right now and wants to make sure all is OK.  Cur

## 2021-12-27 ENCOUNTER — TELEPHONE (OUTPATIENT)
Dept: FAMILY MEDICINE CLINIC | Facility: CLINIC | Age: 36
End: 2021-12-27

## 2021-12-27 NOTE — TELEPHONE ENCOUNTER
I spoke with pt, said yesterday she slept more and felt like she had more energy; by the afternoon runny nose - like a faucet and cough. Today woke with a HA - sinus pressure. 3year old has ear infection. She is taking flonase BID, finishing off antibiotics. Cough med from IC, needing 2-3 times a day again - was on 0-1 x per day. Reviewed and discussed OK to take benzonatate TID PRN; can take sudafed sparingly, not to take this with nyquil/dayquil, also not to take with extra tylenol. Can take ibuprofen PRN. Reviewed and discussed to consider re-testing for COVID, or may have other/new virus that is worsening s/s. She will consider getting re-tested. Virtual visit scheduled with SUSANA, next available wed.

## 2021-12-27 NOTE — TELEPHONE ENCOUNTER
Would consider re-testing for COVID--it's possible this is a new/separate infection. Can offer visit.

## 2021-12-27 NOTE — TELEPHONE ENCOUNTER
Pt had virtual visit 12/20/21 with Suni Hawkins and medication was adjusted for symptoms. Pt was slowly getting better. Started yesterday afternoon her nose was dripping, sinus headache, and cough have come back. Pt looking to see if she should do anything different to help.

## 2021-12-29 ENCOUNTER — LAB ENCOUNTER (OUTPATIENT)
Dept: LAB | Facility: HOSPITAL | Age: 36
End: 2021-12-29
Attending: NURSE PRACTITIONER
Payer: COMMERCIAL

## 2021-12-29 ENCOUNTER — TELEMEDICINE (OUTPATIENT)
Dept: FAMILY MEDICINE CLINIC | Facility: CLINIC | Age: 36
End: 2021-12-29
Payer: COMMERCIAL

## 2021-12-29 DIAGNOSIS — R05.9 COUGH: ICD-10-CM

## 2021-12-29 DIAGNOSIS — R09.81 SINUS CONGESTION: ICD-10-CM

## 2021-12-29 DIAGNOSIS — J01.40 ACUTE NON-RECURRENT PANSINUSITIS: ICD-10-CM

## 2021-12-29 DIAGNOSIS — R06.2 WHEEZING: ICD-10-CM

## 2021-12-29 DIAGNOSIS — R05.9 COUGH: Primary | ICD-10-CM

## 2021-12-29 PROCEDURE — 99213 OFFICE O/P EST LOW 20 MIN: CPT | Performed by: NURSE PRACTITIONER

## 2021-12-29 RX ORDER — ALBUTEROL SULFATE 90 UG/1
2 AEROSOL, METERED RESPIRATORY (INHALATION) EVERY 6 HOURS PRN
Qty: 1 EACH | Refills: 0 | Status: SHIPPED | OUTPATIENT
Start: 2021-12-29

## 2021-12-29 NOTE — PATIENT INSTRUCTIONS
Coronavirus Disease 2019 (COVID-19)     [facility] is committed to the safety and well-being of our patients, members, employees, and communities.  As concerns arise about the new strain of coronavirus that causes COVID-19, [facility] exposure  • After day 7 from date of last exposure with a negative test result (test must occur on day 5 or later)  After stopping quarantine, you should  • Watch for symptoms until 14 days after exposure.   • If you have symptoms, immediately self-isolate Care     If you are awaiting test results or are confirmed positive for COVID -19, and your symptoms worsen at home with symptoms such as: extreme weakness, difficult breathing, or unrelenting fevers greater than 100.4 degrees Fahrenheit, you should contac Follow-up  If you are diagnosed with COVID, refrain from exercise until approved by your primary care provider. Please call your primary care provider within 2 days of your discharge to arrange for a telehealth follow-up.  CDC does not recommend repeat test Control & Prevention (CDC)  10 things you can do to manage your health at home, Suad.nl. pdf  [employer] Retrieved March 17, 2021, from https://health.[facility]/coronavirus/covid-19-information/covid-19-long-haulers. html  Long-term effects of covid-19. (n.d.).  Retrieved May 11, 2021, from [employer] (oxygen saturation, or O2 sat) may be checked often. This is to make sure your lungs are getting enough oxygen into your body. Your O2 sat level may be checked after each time you change position.   Getting ready for proning at home  Before you do prone pos needed. · If you have neck problems, you can fold a towel into a horseshoe shape to support your face. This will let you lie face down without turning your head to the side. · Try putting your arms in different positions to see what is most comfortable.

## 2021-12-29 NOTE — PROGRESS NOTES
Due to the real risk of possible exposure to Coronavirus (CoV-2, COVID-19) in the office/medical building and recommendations for social distancing (key to mitigation/limiting the spread of the virus) a Virtual or Telemedicine visit over the phone was perf Coding/billing information is submitted for this visit based on complexity of care and/or time spent for the visit.     HPI:  Patient presents with:  Acute: COVID/cold symptoms      Antonio Miner is a 43year old female who calls for complaints of:    Return benzonatate 100 MG Oral Cap Take 1 capsule (100 mg total) by mouth 3 (three) times daily as needed for cough.  30 capsule 0   • omeprazole 20 MG Oral Capsule Delayed Release Take 20 mg by mouth every morning before breakfast.     • Cetirizine HCl 5 MG Oral Aero Soln; Inhale 2 puffs into the lungs every 6 (six) hours as needed for Wheezing. Dispense: 1 each; Refill: 0    2. Wheezing  -Will try albuterol inhaler 2 puffs Q6H PRN for wheezing.  Discussed how to use and possible side effects.  - SARS-COV-2 BY PCR

## 2021-12-30 NOTE — ED PROVIDER NOTES
Patient Seen in: [facility]    History   Patient presents with:  Sore Throat    Stated Complaint: dx w/strep throat; symptoms worsening;loss of smell & taste;fevers;body aches;h*    HPI    Gabriella Gonzalez is a 43year old female who presents to Stroke Paternal Grandmother         CVA (stroke)   • Other (Other) Paternal Grandmother         NO DX IN NG, JUST 'OTHER'   • Alcohol and Other Disorders Associated Paternal Grandfather         alcoholism   • Colon Cancer Paternal Grandfather    • Cancer P Respiratory effort was normal.  There is no rales, wheezes, or rhonchi. There is no stridor. Air entry is equal.  Cardiovascular: Regular rate and rhythm, no murmurs, gallops, rubs. Capillary refill is brisk. No extremity edema.   Gastrointestinal: Marshall Kilts

## 2021-12-31 LAB — SARS-COV-2 RNA RESP QL NAA+PROBE: NOT DETECTED

## 2022-02-02 ENCOUNTER — HOSPITAL ENCOUNTER (OUTPATIENT)
Dept: ULTRASOUND IMAGING | Facility: HOSPITAL | Age: 37
Discharge: HOME | End: 2022-02-02
Attending: OBSTETRICS & GYNECOLOGY
Payer: COMMERCIAL

## 2022-02-02 ENCOUNTER — HOSPITAL ENCOUNTER (OUTPATIENT)
Dept: MAMMOGRAPHY | Facility: HOSPITAL | Age: 37
Discharge: HOME | End: 2022-02-02
Attending: OBSTETRICS & GYNECOLOGY
Payer: COMMERCIAL

## 2022-02-02 DIAGNOSIS — N63.10 MASS OF RIGHT BREAST, UNSPECIFIED QUADRANT: ICD-10-CM

## 2022-02-02 PROCEDURE — 76642 ULTRASOUND BREAST LIMITED: CPT | Performed by: OBSTETRICS & GYNECOLOGY

## 2022-02-02 PROCEDURE — 77066 DX MAMMO INCL CAD BI: CPT | Performed by: OBSTETRICS & GYNECOLOGY

## 2022-02-02 PROCEDURE — 77062 BREAST TOMOSYNTHESIS BI: CPT | Performed by: OBSTETRICS & GYNECOLOGY

## 2022-04-19 ENCOUNTER — VIRTUAL PHONE E/M (OUTPATIENT)
Dept: FAMILY MEDICINE CLINIC | Facility: CLINIC | Age: 37
End: 2022-04-19

## 2022-04-19 ENCOUNTER — TELEPHONE (OUTPATIENT)
Dept: FAMILY MEDICINE CLINIC | Facility: CLINIC | Age: 37
End: 2022-04-19

## 2022-04-19 DIAGNOSIS — M54.50 ACUTE BILATERAL LOW BACK PAIN WITHOUT SCIATICA: Primary | ICD-10-CM

## 2022-04-19 DIAGNOSIS — M54.17 LUMBOSACRAL RADICULOPATHY: ICD-10-CM

## 2022-04-19 DIAGNOSIS — M62.830 MUSCLE SPASM OF BACK: ICD-10-CM

## 2022-04-19 DIAGNOSIS — M51.27 LUMBOSACRAL DISC HERNIATION: ICD-10-CM

## 2022-04-19 PROCEDURE — 99214 OFFICE O/P EST MOD 30 MIN: CPT | Performed by: FAMILY MEDICINE

## 2022-04-19 RX ORDER — CYCLOBENZAPRINE HCL 10 MG
TABLET ORAL
Qty: 30 TABLET | Refills: 1 | Status: SHIPPED | OUTPATIENT
Start: 2022-04-19

## 2022-04-19 RX ORDER — PREDNISONE 20 MG/1
TABLET ORAL
Qty: 10 TABLET | Refills: 0 | Status: SHIPPED | OUTPATIENT
Start: 2022-04-19

## 2022-04-19 RX ORDER — DICLOFENAC SODIUM 75 MG/1
TABLET, DELAYED RELEASE ORAL
Qty: 30 TABLET | Refills: 1 | Status: SHIPPED | OUTPATIENT
Start: 2022-04-19

## 2022-04-19 NOTE — TELEPHONE ENCOUNTER
Received pt call this am re: persistent lower back pain x 3 days. Pain is dull, aching in nature and centralized along lumbar spine/ saccral area w/o any radiation to either flank or to either lower extremity. Pt states she did not experience a fall / back injury but was socializing Friday evening and after alcohol, \"slept hard\" and believes she twisted herself in bed bc woke in the am Saturday 4/16 w/intense back pain. Pain persisting today and muscles now very tight as well exacerbating the pain. Cannot use her core muscles at all given the pain. Pt tried OTC ibuprofen 600 mg Q6-8 hrs and icing of back initially, switched to heating pad yesterday while working/ sitting in office chair and pain worsened w/ sitting upright and heat. Pain continues today and pt cannot rest her spine, lay down bc  tested positive for covid this am and is now in quarantine and she is caring for children including 1 y/o toddler she cannot . She tested children w/ home tests (all negative thus far) but was unable to test herself given she ran out of home tests. Her father is bringing her a new home test kit this am to test herself. Asking for some other potential medication to ease the tightness and pain in LBP. Virtual appt given for today w/ Dr Ana Laura Gonzalez @ 4p. Pt will increase the frequency of the Ibuprofen to 600 mg Q 4 hrs and attempt back rest as is possible until appt til afternoon. Pt acknowledges GERD symptom exacerbation is possible w/ persistent NSAID use but is  taking her prescribed omeprazole w/o any GERD S/S currently.

## 2022-04-25 ENCOUNTER — PATIENT MESSAGE (OUTPATIENT)
Dept: FAMILY MEDICINE CLINIC | Facility: CLINIC | Age: 37
End: 2022-04-25

## 2022-04-25 DIAGNOSIS — M51.27 LUMBOSACRAL DISC HERNIATION: ICD-10-CM

## 2022-04-25 DIAGNOSIS — M54.50 ACUTE BILATERAL LOW BACK PAIN WITHOUT SCIATICA: Primary | ICD-10-CM

## 2022-04-25 DIAGNOSIS — M62.830 MUSCLE SPASM OF BACK: ICD-10-CM

## 2022-04-25 DIAGNOSIS — M54.17 LUMBOSACRAL RADICULOPATHY: ICD-10-CM

## 2022-04-26 NOTE — TELEPHONE ENCOUNTER
From: Daisha Beard  To: Wilfredo Esparza MD  Sent: 4/25/2022 11:21 AM CDT  Subject: Back Pain much better but still there    Hi Dr. Gini Guerrero,  Thank you again for seeing me virtually last week; my best is significantly better since last Tuesday. Though debilitating pain is gone there is persistent pain, discomfort and stiffness. Now, I am still sleeping on our pull out couch since my 's 10 day quarantine doesn't end until tomorrow (4/26). I'm hopeful to have some relief when I'm back in my bed. I have finished the 5 day steroid and still on the anti-inflammatory. I take the muscle relaxant at night and only tried it a few times during the day. What can I take for pain during the day since I'm on the anti-inflammatory? Or should I try the muscle relaxant more during the day? Thanks for your time!

## 2022-05-09 ENCOUNTER — HOSPITAL ENCOUNTER (OUTPATIENT)
Age: 37
Discharge: HOME | End: 2022-05-09
Payer: COMMERCIAL

## 2022-05-09 VITALS
RESPIRATION RATE: 18 BRPM | OXYGEN SATURATION: 98 % | DIASTOLIC BLOOD PRESSURE: 103 MMHG | HEART RATE: 77 BPM | TEMPERATURE: 98 F | SYSTOLIC BLOOD PRESSURE: 144 MMHG

## 2022-05-09 DIAGNOSIS — J06.9 UPPER RESPIRATORY TRACT INFECTION, UNSPECIFIED TYPE: ICD-10-CM

## 2022-05-09 DIAGNOSIS — Z20.822 ENCOUNTER FOR SCREENING LABORATORY TESTING FOR COVID-19 VIRUS: Primary | ICD-10-CM

## 2022-05-09 DIAGNOSIS — H66.91 RIGHT OTITIS MEDIA, UNSPECIFIED OTITIS MEDIA TYPE: ICD-10-CM

## 2022-05-09 LAB
S PYO AG THROAT QL: NEGATIVE
SARS-COV-2 RNA RESP QL NAA+PROBE: NOT DETECTED

## 2022-05-09 PROCEDURE — 99213 OFFICE O/P EST LOW 20 MIN: CPT | Performed by: NURSE PRACTITIONER

## 2022-05-09 PROCEDURE — 87880 STREP A ASSAY W/OPTIC: CPT | Performed by: NURSE PRACTITIONER

## 2022-05-09 PROCEDURE — U0002 COVID-19 LAB TEST NON-CDC: HCPCS | Performed by: NURSE PRACTITIONER

## 2022-05-09 RX ORDER — AMOXICILLIN 875 MG/1
875 TABLET, COATED ORAL 2 TIMES DAILY
Qty: 20 TABLET | Refills: 0 | Status: SHIPPED | OUTPATIENT
Start: 2022-05-09 | End: 2022-05-19

## 2022-05-09 NOTE — ED INITIAL ASSESSMENT (HPI)
Patient is here with complaints of sore throat that is getting worse and now a cough and some head congestion.

## 2022-05-26 LAB
CYTOLOGY CVX/VAG DOC THIN PREP: NORMAL
HPV16+18+45 E6+E7MRNA CVX NAA+PROBE: NEGATIVE

## 2022-06-14 ENCOUNTER — TELEPHONE (OUTPATIENT)
Dept: PHYSICAL MEDICINE AND REHAB | Facility: CLINIC | Age: 37
End: 2022-06-14

## 2022-06-14 ENCOUNTER — PATIENT MESSAGE (OUTPATIENT)
Dept: PHYSICAL MEDICINE AND REHAB | Facility: CLINIC | Age: 37
End: 2022-06-14

## 2022-06-14 ENCOUNTER — OFFICE VISIT (OUTPATIENT)
Dept: PHYSICAL MEDICINE AND REHAB | Facility: CLINIC | Age: 37
End: 2022-06-14
Payer: COMMERCIAL

## 2022-06-14 VITALS
WEIGHT: 190 LBS | BODY MASS INDEX: 32.44 KG/M2 | DIASTOLIC BLOOD PRESSURE: 88 MMHG | HEIGHT: 64 IN | SYSTOLIC BLOOD PRESSURE: 109 MMHG

## 2022-06-14 DIAGNOSIS — M54.16 LEFT LUMBAR RADICULOPATHY: Primary | ICD-10-CM

## 2022-06-14 DIAGNOSIS — E78.00 HYPERCHOLESTEROLEMIA: ICD-10-CM

## 2022-06-14 DIAGNOSIS — K21.9 GASTROESOPHAGEAL REFLUX DISEASE, UNSPECIFIED WHETHER ESOPHAGITIS PRESENT: ICD-10-CM

## 2022-06-14 DIAGNOSIS — M54.16 ACUTE LEFT LUMBAR RADICULOPATHY: Primary | ICD-10-CM

## 2022-06-14 PROCEDURE — 99244 OFF/OP CNSLTJ NEW/EST MOD 40: CPT | Performed by: PHYSICAL MEDICINE & REHABILITATION

## 2022-06-14 PROCEDURE — 3074F SYST BP LT 130 MM HG: CPT | Performed by: PHYSICAL MEDICINE & REHABILITATION

## 2022-06-14 PROCEDURE — 3008F BODY MASS INDEX DOCD: CPT | Performed by: PHYSICAL MEDICINE & REHABILITATION

## 2022-06-14 PROCEDURE — 3079F DIAST BP 80-89 MM HG: CPT | Performed by: PHYSICAL MEDICINE & REHABILITATION

## 2022-06-14 RX ORDER — GABAPENTIN 300 MG/1
CAPSULE ORAL
Qty: 90 CAPSULE | Refills: 0 | Status: SHIPPED | OUTPATIENT
Start: 2022-06-14

## 2022-06-14 RX ORDER — METHYLPREDNISOLONE 4 MG/1
TABLET ORAL
Qty: 1 EACH | Refills: 0 | Status: SHIPPED | OUTPATIENT
Start: 2022-06-14

## 2022-06-14 NOTE — PATIENT INSTRUCTIONS
-Start physical therapy and home exercises  -Medrol dose pack to be started today  -Gabapentin 300mg three times daily  -Ice/Heat as tolerated  -MRI of the lumbar spine and follow up after  -Please stop the medication if you have any side effects and call the office if you have any questions or concerns  -Will discuss possible epidural injection after MRI

## 2022-06-15 NOTE — TELEPHONE ENCOUNTER
MRI has been ordered again. I spoke with the patient over the phone. Advised her that the symptoms are typical with the lumbar disc herniation. I have advised her to continue the current medications. She denies any saddle anesthesia, any loss of bowel bladder control. She does feel some urine urgency with squatting and endorses some stress incontinence. She denies any new weakness but has had left lower extremity weakness that is unchanged.     Frank Ramirez DO, FAAPMR & CAQSM  Physical Medicine and Rehabilitation/Sports Medicine  [facility]

## 2022-06-15 NOTE — TELEPHONE ENCOUNTER
From: Marciano Dandy  To: Buster Martinez. Andry Jacobo DO  Sent: 6/14/2022 9:17 PM CDT  Subject: Back pain - leg numbness    Hi Dr. Andry Jacobo,    Thank you for seeing me today; I am writing to follow up. The pain down my left leg is now causing my foot to feel prickly and a little numb. I JUST started the steroid this afternoon and the nerve medicine and just wanted to follow up with you. Also, I forgot to mention in my visit today that I think I'm also having some incontinence issues. I'm hoping that it's just the back pain and as I heal and eventually get stronger it will resolve itself. The numbness and muscle twitches in my left butt and calf are just worrying me. Is this normal with this much pain for sciatica? Thanks again for your time.

## 2022-06-15 NOTE — TELEPHONE ENCOUNTER
Luke Cancer from central scheduling is calling to check if there is an MRI or for pt, please advise. -NL

## 2022-06-15 NOTE — TELEPHONE ENCOUNTER
Order for MRI Lumbar has not been sent. Order placed for MRI Lumbar. Dr. Christine Shrestha was notified about patient's experiencing symptoms. Dr. Christien Shrestha will reach out.

## 2022-06-16 ENCOUNTER — PATIENT MESSAGE (OUTPATIENT)
Dept: PHYSICAL MEDICINE AND REHAB | Facility: CLINIC | Age: 37
End: 2022-06-16

## 2022-06-16 ENCOUNTER — TELEPHONE (OUTPATIENT)
Dept: PHYSICAL THERAPY | Facility: HOSPITAL | Age: 37
End: 2022-06-16

## 2022-06-17 ENCOUNTER — OFFICE VISIT (OUTPATIENT)
Dept: PHYSICAL THERAPY | Facility: HOSPITAL | Age: 37
End: 2022-06-17
Attending: PHYSICAL MEDICINE & REHABILITATION
Payer: COMMERCIAL

## 2022-06-17 PROCEDURE — 97110 THERAPEUTIC EXERCISES: CPT | Performed by: PHYSICAL THERAPIST

## 2022-06-17 PROCEDURE — 97161 PT EVAL LOW COMPLEX 20 MIN: CPT | Performed by: PHYSICAL THERAPIST

## 2022-06-20 ENCOUNTER — APPOINTMENT (OUTPATIENT)
Dept: PHYSICAL THERAPY | Facility: HOSPITAL | Age: 37
End: 2022-06-20
Attending: PHYSICAL MEDICINE & REHABILITATION
Payer: COMMERCIAL

## 2022-06-21 ENCOUNTER — OFFICE VISIT (OUTPATIENT)
Dept: PHYSICAL THERAPY | Facility: HOSPITAL | Age: 37
End: 2022-06-21
Attending: PHYSICAL MEDICINE & REHABILITATION
Payer: COMMERCIAL

## 2022-06-21 ENCOUNTER — HOSPITAL ENCOUNTER (OUTPATIENT)
Dept: MRI IMAGING | Age: 37
Discharge: HOME | End: 2022-06-21
Attending: PHYSICAL MEDICINE & REHABILITATION
Payer: COMMERCIAL

## 2022-06-21 ENCOUNTER — TELEPHONE (OUTPATIENT)
Dept: NEUROLOGY | Facility: CLINIC | Age: 37
End: 2022-06-21

## 2022-06-21 DIAGNOSIS — M54.16 LEFT LUMBAR RADICULOPATHY: ICD-10-CM

## 2022-06-21 PROCEDURE — 97110 THERAPEUTIC EXERCISES: CPT

## 2022-06-21 PROCEDURE — 97530 THERAPEUTIC ACTIVITIES: CPT

## 2022-06-21 PROCEDURE — 72148 MRI LUMBAR SPINE W/O DYE: CPT | Performed by: PHYSICAL MEDICINE & REHABILITATION

## 2022-06-21 NOTE — TELEPHONE ENCOUNTER
Pt call to inform she had her MRI done today and mal a virtual apt for MRI test result on 07/08/22. BUT pt feels she need to be seen this week per the last conversation she had with  .   Please advise

## 2022-06-22 ENCOUNTER — APPOINTMENT (OUTPATIENT)
Dept: PHYSICAL THERAPY | Facility: HOSPITAL | Age: 37
End: 2022-06-22
Attending: FAMILY MEDICINE
Payer: COMMERCIAL

## 2022-06-22 ENCOUNTER — TELEPHONE (OUTPATIENT)
Dept: PHYSICAL THERAPY | Age: 37
End: 2022-06-22

## 2022-06-24 ENCOUNTER — OFFICE VISIT (OUTPATIENT)
Dept: PHYSICAL THERAPY | Facility: HOSPITAL | Age: 37
End: 2022-06-24
Attending: FAMILY MEDICINE
Payer: COMMERCIAL

## 2022-06-24 ENCOUNTER — TELEPHONE (OUTPATIENT)
Dept: PHYSICAL THERAPY | Facility: HOSPITAL | Age: 37
End: 2022-06-24

## 2022-06-24 PROCEDURE — 97110 THERAPEUTIC EXERCISES: CPT

## 2022-06-24 NOTE — TELEPHONE ENCOUNTER
----- Message from Chepe Ernandez PT sent at 6/23/2022  5:58 PM CDT -----  Regarding: call to offer appts  I think I see some openings the week after July 4. Can you get her added at least 1x/wk where you can starting the week of July 4 (I don't think I have anything yet) asap before someone else takes them?  (She is on for next wk) Thanks

## 2022-06-29 ENCOUNTER — TELEPHONE (OUTPATIENT)
Dept: PHYSICAL MEDICINE AND REHAB | Facility: CLINIC | Age: 37
End: 2022-06-29

## 2022-06-29 ENCOUNTER — OFFICE VISIT (OUTPATIENT)
Dept: PHYSICAL THERAPY | Facility: HOSPITAL | Age: 37
End: 2022-06-29
Attending: PHYSICAL MEDICINE & REHABILITATION
Payer: COMMERCIAL

## 2022-06-29 ENCOUNTER — TELEMEDICINE (OUTPATIENT)
Dept: PHYSICAL MEDICINE AND REHAB | Facility: CLINIC | Age: 37
End: 2022-06-29

## 2022-06-29 DIAGNOSIS — E78.00 HYPERCHOLESTEROLEMIA: ICD-10-CM

## 2022-06-29 DIAGNOSIS — K21.9 GASTROESOPHAGEAL REFLUX DISEASE, UNSPECIFIED WHETHER ESOPHAGITIS PRESENT: ICD-10-CM

## 2022-06-29 DIAGNOSIS — M54.16 LEFT LUMBAR RADICULOPATHY: Primary | ICD-10-CM

## 2022-06-29 PROCEDURE — 99214 OFFICE O/P EST MOD 30 MIN: CPT | Performed by: PHYSICAL MEDICINE & REHABILITATION

## 2022-06-29 PROCEDURE — 97110 THERAPEUTIC EXERCISES: CPT

## 2022-06-29 NOTE — PATIENT INSTRUCTIONS
-Continue Gabapentin 300mg at nighttime  -Continue PT and home exercises  -See neurosurgery  -Follow up in 4 weeks

## 2022-06-29 NOTE — TELEPHONE ENCOUNTER
Initiated authorization for Left S1 Transforaminal Epidural Steroid Injection under fluoroscopy guidance CPT [id number] with Celine GIRARD at [facility]  Case #[id number].   Status: Approved-authorization is not required per health plan

## 2022-06-29 NOTE — TELEPHONE ENCOUNTER
LMTCB 1st attempt    Procedure: Left S1 Transforaminal epidural steroid injection  Anesthesia: Local  Dx: M54.16  Location: [address] [place] [address]   CPT Codes: A3841042, 99855

## 2022-06-30 NOTE — TELEPHONE ENCOUNTER
Patient has been scheduled for Left S1 Transforaminal epidural steroid injection on 7/25/22 at the [place] with Dr. Brandon Sanches.   -Anesthesia type: Local.  -If receiving MAC or IVC sedation patient will need to get COVID tested 3 days prior even if already vaccinated (order placed by [place].)  -If scheduling [address] covid testing required for all procedures whether patient is vaccinated or not. -Patient informed not to eat or drink anything after midnight the night prior to the procedure, if being sedated. -Patient was advised that if he/she does receive the covid vaccine it needs to be at least 2 weeks before or after the injection. -Medications and allergies reviewed. -Patient reminded to hold NSAIDs (Ibuprofen, ASA 81, Aleve, Naproxen, Mobic etc.) for 3 days prior to [place]  if BMI is greater than 35. For Cervical injections only hold multivitamins, Vitamin E, Fish Oil, Phentermine (Lomaira) for 7 days prior to injection and NSAIDS.  mg to be held for 7 days prior to injections.  -If patient is receiving MAC/IVCS Phentermine Basim Macon) will need to be held for 7 days prior to injection.  -If on blood thinner clearance has been received to hold this medication by provider.   -Patient informed he/she will need a  to and from procedure. -[facility] is located in the [facility] 1st floor. Patient may park in the yellow parking. Patient verbalized understanding and agrees with plan.  -----> Scheduled in Epic: Yes  -----> Scheduled in [employer]:  Yes

## 2022-07-07 ENCOUNTER — OFFICE VISIT (OUTPATIENT)
Dept: PHYSICAL THERAPY | Facility: HOSPITAL | Age: 37
End: 2022-07-07
Attending: PHYSICAL MEDICINE & REHABILITATION
Payer: COMMERCIAL

## 2022-07-07 PROCEDURE — 97110 THERAPEUTIC EXERCISES: CPT

## 2022-07-11 ENCOUNTER — OFFICE VISIT (OUTPATIENT)
Dept: SURGERY | Facility: CLINIC | Age: 37
End: 2022-07-11

## 2022-07-11 DIAGNOSIS — M54.16 LUMBAR RADICULOPATHY, ACUTE: Primary | ICD-10-CM

## 2022-07-11 PROCEDURE — 64483 NJX AA&/STRD TFRM EPI L/S 1: CPT | Performed by: PHYSICAL MEDICINE & REHABILITATION

## 2022-07-11 NOTE — PROCEDURES
[facility] [address]    LEFT S1 TFESI   NAME:  Randal Szymanski    MR #:    [id number] :  10/14/1985     PHYSICIAN:  Abelardo Downey. George Limon DO        Operative Report    DATE OF PROCEDURE: 2022   PREOPERATIVE DIAGNOSES: 1. Lumbar radiculopathy, acute        POSTOPERATIVE DIAGNOSES:   1. Lumbar radiculopathy, acute        PROCEDURES: Left S1 transforaminal epidural steroid injections done under fluoroscopic guidance with contrast enhancement. SURGEON: Abelardo Downey. George Limon DO   ANESTHESIA: Local   INDICATIONS:      OPERATIVE PROCEDURE:  Written consent was obtained from the patient. The patient was brought into the operating room and placed in the prone position on the fluoroscopy table with pillow underneath the abdomen. The patient's skin was cleaned and draped in a normal sterile fashion. Using AP fluoroscopy, all five lumbar vertebrae were identified. Then the left first pedicle was identified with the left first sacral foramen inferior to it. Then, 3.5 inch inch, 22-gauge spinal needle was inserted and directed towards the left first sacral foramen. When it was felt to be in good position, Omnipaque-240 contrast was used to obtain a good epidurograms indicating correct needle placement. Then, aspiration was performed. No blood, fluid, or air was aspirated. Then, the patient was injected with a 4 cc solution of 2 cc of 6 mg/cc of Celestone and 2 cc of 1% PF lidocaine without epinephrine on the left side. After this, the needle was removed. The patient's skin was cleaned. Band-Aid was applied. The patient was transferred to the cart and into [facility] [place]. The patient was given discharge instructions and will follow up in the clinic as scheduled. Throughout the whole procedure, the patient's pulse oximetry and vital signs were monitored and they remained completely stable. Also, throughout the whole procedure, prior to injection of any medication, aspiration was performed.   No blood, fluid, or air was aspirated at anytime.     Jameel Khan DO, FAAPMR & CAQSM  Physical Medicine and Rehabilitation/Sports Medicine  [facility]

## 2022-07-12 ENCOUNTER — APPOINTMENT (OUTPATIENT)
Dept: PHYSICAL THERAPY | Facility: HOSPITAL | Age: 37
End: 2022-07-12
Attending: PHYSICAL MEDICINE & REHABILITATION
Payer: COMMERCIAL

## 2022-07-15 ENCOUNTER — APPOINTMENT (OUTPATIENT)
Dept: PHYSICAL THERAPY | Facility: HOSPITAL | Age: 37
End: 2022-07-15
Attending: PHYSICAL MEDICINE & REHABILITATION
Payer: COMMERCIAL

## 2022-07-15 PROCEDURE — 97110 THERAPEUTIC EXERCISES: CPT

## 2022-07-19 ENCOUNTER — APPOINTMENT (OUTPATIENT)
Dept: PHYSICAL THERAPY | Facility: HOSPITAL | Age: 37
End: 2022-07-19
Attending: PHYSICAL MEDICINE & REHABILITATION
Payer: COMMERCIAL

## 2022-07-22 ENCOUNTER — APPOINTMENT (OUTPATIENT)
Dept: PHYSICAL THERAPY | Facility: HOSPITAL | Age: 37
End: 2022-07-22
Attending: PHYSICAL MEDICINE & REHABILITATION
Payer: COMMERCIAL

## 2022-07-22 ENCOUNTER — TELEMEDICINE (OUTPATIENT)
Dept: PHYSICAL MEDICINE AND REHAB | Facility: CLINIC | Age: 37
End: 2022-07-22

## 2022-07-22 DIAGNOSIS — R29.898 WEAKNESS OF LEFT FOOT: Primary | ICD-10-CM

## 2022-07-22 DIAGNOSIS — M54.16 LEFT LUMBAR RADICULOPATHY: ICD-10-CM

## 2022-07-22 PROCEDURE — 97110 THERAPEUTIC EXERCISES: CPT

## 2022-07-22 PROCEDURE — 99214 OFFICE O/P EST MOD 30 MIN: CPT | Performed by: PHYSICAL MEDICINE & REHABILITATION

## 2022-07-22 RX ORDER — MELOXICAM 15 MG/1
15 TABLET ORAL DAILY
Qty: 14 TABLET | Refills: 0 | Status: SHIPPED | OUTPATIENT
Start: 2022-07-22 | End: 2022-08-05

## 2022-07-26 ENCOUNTER — TELEPHONE (OUTPATIENT)
Dept: PHYSICAL MEDICINE AND REHAB | Facility: CLINIC | Age: 37
End: 2022-07-26

## 2022-07-26 ENCOUNTER — APPOINTMENT (OUTPATIENT)
Dept: PHYSICAL THERAPY | Facility: HOSPITAL | Age: 37
End: 2022-07-26
Attending: PHYSICAL MEDICINE & REHABILITATION
Payer: COMMERCIAL

## 2022-07-27 ENCOUNTER — OFFICE VISIT (OUTPATIENT)
Dept: PHYSICAL THERAPY | Facility: HOSPITAL | Age: 37
End: 2022-07-27
Attending: PHYSICAL MEDICINE & REHABILITATION
Payer: COMMERCIAL

## 2022-07-27 PROCEDURE — 97110 THERAPEUTIC EXERCISES: CPT

## 2022-07-29 ENCOUNTER — APPOINTMENT (OUTPATIENT)
Dept: PHYSICAL THERAPY | Facility: HOSPITAL | Age: 37
End: 2022-07-29
Attending: PHYSICAL MEDICINE & REHABILITATION
Payer: COMMERCIAL

## 2022-08-03 ENCOUNTER — OFFICE VISIT (OUTPATIENT)
Dept: PHYSICAL THERAPY | Facility: HOSPITAL | Age: 37
End: 2022-08-03
Attending: PHYSICAL MEDICINE & REHABILITATION
Payer: COMMERCIAL

## 2022-08-03 ENCOUNTER — OFFICE VISIT (OUTPATIENT)
Dept: SURGERY | Facility: CLINIC | Age: 37
End: 2022-08-03
Payer: COMMERCIAL

## 2022-08-03 VITALS — DIASTOLIC BLOOD PRESSURE: 78 MMHG | SYSTOLIC BLOOD PRESSURE: 120 MMHG | HEART RATE: 73 BPM

## 2022-08-03 DIAGNOSIS — M51.26 HNP (HERNIATED NUCLEUS PULPOSUS), LUMBAR: Primary | ICD-10-CM

## 2022-08-03 PROCEDURE — 3078F DIAST BP <80 MM HG: CPT | Performed by: NEUROLOGICAL SURGERY

## 2022-08-03 PROCEDURE — 3074F SYST BP LT 130 MM HG: CPT | Performed by: NEUROLOGICAL SURGERY

## 2022-08-03 PROCEDURE — 99204 OFFICE O/P NEW MOD 45 MIN: CPT | Performed by: NEUROLOGICAL SURGERY

## 2022-08-03 RX ORDER — SERTRALINE HYDROCHLORIDE 25 MG/1
TABLET, FILM COATED ORAL
COMMUNITY

## 2022-08-03 RX ORDER — CETIRIZINE HYDROCHLORIDE 10 MG/1
10 TABLET ORAL DAILY
COMMUNITY

## 2022-08-03 NOTE — PROGRESS NOTES
Pt here for VESNA lower back pain- Pt has weakness and numbness on left leg. Pt had cortisone injection. 7/11/22.  Pt is in PT and pt had MRI            Review of Systems:    Hand Dominance: right  General: weight change  Neuro: weakness left leg  Head: headache  Musculoskeletal: numbness  Cardiovascular: no symptoms reported  Gastrointestinal: heartburn  Genitourinary: no symptoms reported  Respiratory: no symptoms reported  Eyes: no symptoms reported  Skin: no symptoms reported  Mouth & throat: no symptoms reported  Neck: pain and stiffness  Nose: no symptoms reported  Psychiatric: anxiety

## 2022-08-10 ENCOUNTER — OFFICE VISIT (OUTPATIENT)
Dept: PHYSICAL THERAPY | Facility: HOSPITAL | Age: 37
End: 2022-08-10
Attending: PHYSICAL MEDICINE & REHABILITATION
Payer: COMMERCIAL

## 2022-08-10 PROCEDURE — 97110 THERAPEUTIC EXERCISES: CPT

## 2022-08-15 ENCOUNTER — APPOINTMENT (OUTPATIENT)
Dept: PHYSICAL THERAPY | Facility: HOSPITAL | Age: 37
End: 2022-08-15
Attending: PHYSICAL MEDICINE & REHABILITATION
Payer: COMMERCIAL

## 2022-08-15 ENCOUNTER — TELEPHONE (OUTPATIENT)
Dept: PHYSICAL THERAPY | Facility: HOSPITAL | Age: 37
End: 2022-08-15

## 2022-08-26 ENCOUNTER — OFFICE VISIT (OUTPATIENT)
Dept: PHYSICAL THERAPY | Facility: HOSPITAL | Age: 37
End: 2022-08-26
Attending: PHYSICAL MEDICINE & REHABILITATION
Payer: COMMERCIAL

## 2022-08-26 PROCEDURE — 97110 THERAPEUTIC EXERCISES: CPT

## 2022-08-30 ENCOUNTER — OFFICE VISIT (OUTPATIENT)
Dept: DERMATOLOGY CLINIC | Facility: CLINIC | Age: 37
End: 2022-08-30
Payer: COMMERCIAL

## 2022-08-30 DIAGNOSIS — L82.1 SEBORRHEIC KERATOSIS: ICD-10-CM

## 2022-08-30 DIAGNOSIS — Z12.83 SCREENING EXAM FOR SKIN CANCER: Primary | ICD-10-CM

## 2022-08-30 DIAGNOSIS — L70.0 ACNE VULGARIS: ICD-10-CM

## 2022-08-30 DIAGNOSIS — D22.9 BENIGN MOLE: ICD-10-CM

## 2022-08-30 PROCEDURE — 17110 DESTRUCTION B9 LES UP TO 14: CPT | Performed by: PHYSICIAN ASSISTANT

## 2022-08-30 PROCEDURE — 99213 OFFICE O/P EST LOW 20 MIN: CPT | Performed by: PHYSICIAN ASSISTANT

## 2022-08-30 RX ORDER — TRANEXAMIC ACID 650 1/1
TABLET ORAL
COMMUNITY

## 2022-08-30 RX ORDER — TRANEXAMIC ACID 650 1/1
1300 TABLET ORAL 3 TIMES DAILY
COMMUNITY
Start: 2022-05-26

## 2022-08-30 RX ORDER — COVID-19 ANTIGEN TEST
KIT MISCELLANEOUS
COMMUNITY
Start: 2022-08-25

## 2022-08-31 ENCOUNTER — OFFICE VISIT (OUTPATIENT)
Dept: PHYSICAL THERAPY | Facility: HOSPITAL | Age: 37
End: 2022-08-31
Attending: PHYSICAL MEDICINE & REHABILITATION
Payer: COMMERCIAL

## 2022-08-31 PROCEDURE — 97110 THERAPEUTIC EXERCISES: CPT

## 2022-09-01 ENCOUNTER — PATIENT MESSAGE (OUTPATIENT)
Dept: FAMILY MEDICINE CLINIC | Facility: CLINIC | Age: 37
End: 2022-09-01

## 2022-09-08 ENCOUNTER — TELEMEDICINE (OUTPATIENT)
Dept: FAMILY MEDICINE CLINIC | Facility: CLINIC | Age: 37
End: 2022-09-08
Payer: COMMERCIAL

## 2022-09-08 ENCOUNTER — TELEPHONE (OUTPATIENT)
Dept: FAMILY MEDICINE CLINIC | Facility: CLINIC | Age: 37
End: 2022-09-08

## 2022-09-08 DIAGNOSIS — J01.40 ACUTE NON-RECURRENT PANSINUSITIS: Primary | ICD-10-CM

## 2022-09-08 DIAGNOSIS — H10.33 ACUTE BACTERIAL CONJUNCTIVITIS OF BOTH EYES: ICD-10-CM

## 2022-09-08 DIAGNOSIS — R05.1 ACUTE COUGH: ICD-10-CM

## 2022-09-08 DIAGNOSIS — R09.81 NASAL CONGESTION: ICD-10-CM

## 2022-09-08 PROCEDURE — 99213 OFFICE O/P EST LOW 20 MIN: CPT | Performed by: NURSE PRACTITIONER

## 2022-09-08 RX ORDER — AMOXICILLIN AND CLAVULANATE POTASSIUM 875; 125 MG/1; MG/1
1 TABLET, FILM COATED ORAL 2 TIMES DAILY
Qty: 20 TABLET | Refills: 0 | Status: SHIPPED | OUTPATIENT
Start: 2022-09-08 | End: 2022-09-18

## 2022-09-08 RX ORDER — TOBRAMYCIN 3 MG/ML
2 SOLUTION/ DROPS OPHTHALMIC EVERY 6 HOURS
Qty: 5 ML | Refills: 0 | Status: SHIPPED | OUTPATIENT
Start: 2022-09-08

## 2022-09-08 NOTE — TELEPHONE ENCOUNTER
Patient c/o symptoms of pink eye (both eyes), congestion, cough, sore throat, no fever for approx last x 2 weeks, at home test covid negative.     Please advise, Thank you

## 2022-09-13 RX ORDER — CETIRIZINE HYDROCHLORIDE 10 MG/1
CAPSULE, LIQUID FILLED ORAL
COMMUNITY
Start: 2002-06-01

## 2022-09-13 RX ORDER — OMEPRAZOLE 20 MG/1
TABLET, DELAYED RELEASE ORAL
COMMUNITY
Start: 2012-07-01

## 2022-09-13 RX ORDER — FLUTICASONE PROPIONATE 50 MCG
SPRAY, SUSPENSION (ML) NASAL
COMMUNITY
Start: 2022-09-01

## 2022-09-14 ENCOUNTER — TELEMEDICINE (OUTPATIENT)
Dept: FAMILY MEDICINE CLINIC | Facility: CLINIC | Age: 37
End: 2022-09-14

## 2022-09-14 DIAGNOSIS — R41.840 INATTENTION: Primary | ICD-10-CM

## 2022-09-14 DIAGNOSIS — F41.9 ANXIETY: ICD-10-CM

## 2022-09-14 PROCEDURE — 99214 OFFICE O/P EST MOD 30 MIN: CPT | Performed by: NURSE PRACTITIONER

## 2022-09-20 ENCOUNTER — IMMUNIZATION (OUTPATIENT)
Dept: LAB | Age: 37
End: 2022-09-20
Attending: EMERGENCY MEDICINE

## 2022-09-20 DIAGNOSIS — Z23 NEED FOR VACCINATION: Primary | ICD-10-CM

## 2022-09-20 PROCEDURE — 0124A SARSCOV2 VAC BVL 30MCG/0.3ML: CPT

## 2022-10-18 ENCOUNTER — PATIENT MESSAGE (OUTPATIENT)
Dept: FAMILY MEDICINE CLINIC | Facility: CLINIC | Age: 37
End: 2022-10-18

## 2022-10-20 NOTE — TELEPHONE ENCOUNTER
I spoke with pt, declined appt with SUSANA today. Feels like she is turning the corner. Taking sudafed during day and nyquil at night. Discussed to add plain mucinex to regimen. HALEY yesterday, comes and goes. Nasal drainage calming down. Occasional coughing fits, improving. Advised to continue decongestant, vit D, flonase, add plain mucinex. Pt said she ran out of zyrtec, was not helping much anyway. Advised to change it to allegra or claritin. ER/IC precautions discussed - worst HA of her life, sob, cp, chest tightness, syncope, dizziness. Reach out if any changes or questions.

## 2022-10-20 NOTE — TELEPHONE ENCOUNTER
Can offer virtual today at 10:15 (double-book). Otherwise recommend Mucinex and agree with IC PRN. Thanks.

## 2022-10-28 ENCOUNTER — TELEPHONE (OUTPATIENT)
Dept: FAMILY MEDICINE CLINIC | Facility: CLINIC | Age: 37
End: 2022-10-28

## 2022-10-28 ENCOUNTER — E-VISIT (OUTPATIENT)
Dept: TELEHEALTH | Age: 37
End: 2022-10-28

## 2022-10-28 DIAGNOSIS — M54.50 ACUTE LOW BACK PAIN WITHOUT SCIATICA, UNSPECIFIED BACK PAIN LATERALITY: Primary | ICD-10-CM

## 2022-10-28 RX ORDER — CYCLOBENZAPRINE HCL 10 MG
10 TABLET ORAL 3 TIMES DAILY PRN
Qty: 20 TABLET | Refills: 0 | Status: SHIPPED | OUTPATIENT
Start: 2022-10-28

## 2022-10-28 RX ORDER — METHYLPREDNISOLONE 4 MG/1
TABLET ORAL
Qty: 1 EACH | Refills: 0 | Status: SHIPPED | OUTPATIENT
Start: 2022-10-28

## 2022-10-28 NOTE — TELEPHONE ENCOUNTER
Left vm, need to go to IC/WIC or do e-visit now visit for eval by provider for pain med. We do not have openings today. Can call me back if needed.

## 2022-11-02 ENCOUNTER — TELEMEDICINE (OUTPATIENT)
Dept: FAMILY MEDICINE CLINIC | Facility: CLINIC | Age: 37
End: 2022-11-02
Payer: COMMERCIAL

## 2022-11-02 DIAGNOSIS — M54.50 ACUTE MIDLINE LOW BACK PAIN WITHOUT SCIATICA: Primary | ICD-10-CM

## 2022-11-02 DIAGNOSIS — M51.27 LUMBOSACRAL DISC HERNIATION: ICD-10-CM

## 2022-11-02 PROCEDURE — 99213 OFFICE O/P EST LOW 20 MIN: CPT | Performed by: NURSE PRACTITIONER

## 2022-11-02 RX ORDER — NAPROXEN 500 MG/1
500 TABLET ORAL 2 TIMES DAILY WITH MEALS
Qty: 28 TABLET | Refills: 1 | Status: SHIPPED | OUTPATIENT
Start: 2022-11-02 | End: 2022-11-30

## 2023-01-19 ENCOUNTER — OFFICE VISIT (OUTPATIENT)
Dept: PODIATRY CLINIC | Facility: CLINIC | Age: 38
End: 2023-01-19

## 2023-01-19 DIAGNOSIS — L85.9 HYPERKERATOSIS OF SOLE: ICD-10-CM

## 2023-01-19 DIAGNOSIS — B35.1 ONYCHOMYCOSIS: Primary | ICD-10-CM

## 2023-01-19 PROCEDURE — 99203 OFFICE O/P NEW LOW 30 MIN: CPT | Performed by: STUDENT IN AN ORGANIZED HEALTH CARE EDUCATION/TRAINING PROGRAM

## 2023-01-19 RX ORDER — DEXMETHYLPHENIDATE HYDROCHLORIDE 10 MG/1
10 TABLET ORAL 2 TIMES DAILY
COMMUNITY

## 2023-01-19 RX ORDER — BUSPIRONE HYDROCHLORIDE 10 MG/1
10 TABLET ORAL 2 TIMES DAILY
COMMUNITY

## 2023-01-22 RX ORDER — EFINACONAZOLE 100 MG/ML
1 SOLUTION TOPICAL
Qty: 8 ML | Refills: 3 | Status: SHIPPED | OUTPATIENT
Start: 2023-01-23

## 2023-01-23 LAB — CALCOFLUOR WHITE STAIN RESULT: POSITIVE

## 2023-01-25 RX ORDER — CYCLOBENZAPRINE HCL 10 MG
5-10 TABLET ORAL PRN
COMMUNITY

## 2023-01-25 RX ORDER — TRANEXAMIC ACID 650 MG/1
1300 TABLET ORAL 3 TIMES DAILY
COMMUNITY

## 2023-02-01 ENCOUNTER — TELEPHONE (OUTPATIENT)
Dept: PODIATRY CLINIC | Facility: CLINIC | Age: 38
End: 2023-02-01

## 2023-02-01 DIAGNOSIS — B35.1 ONYCHOMYCOSIS: ICD-10-CM

## 2023-02-01 RX ORDER — EFINACONAZOLE 100 MG/ML
1 SOLUTION TOPICAL
Qty: 8 ML | Refills: 3 | Status: SHIPPED | OUTPATIENT
Start: 2023-02-01

## 2023-02-02 ENCOUNTER — TELEPHONE (OUTPATIENT)
Dept: PODIATRY CLINIC | Facility: CLINIC | Age: 38
End: 2023-02-02

## 2023-02-02 NOTE — TELEPHONE ENCOUNTER
Per pharmacy Ifeoma Olmstead is not covered by pts insurance, asking if Dr. Michael Wheatley wants to prescribe other medication. Please advise thank you.

## 2023-03-01 ENCOUNTER — TELEPHONE (OUTPATIENT)
Dept: OBGYN | Age: 38
End: 2023-03-01

## 2023-03-06 ENCOUNTER — OFFICE VISIT (OUTPATIENT)
Dept: FAMILY MEDICINE CLINIC | Facility: CLINIC | Age: 38
End: 2023-03-06
Payer: COMMERCIAL

## 2023-03-06 VITALS
BODY MASS INDEX: 30.39 KG/M2 | SYSTOLIC BLOOD PRESSURE: 124 MMHG | WEIGHT: 178 LBS | HEART RATE: 92 BPM | DIASTOLIC BLOOD PRESSURE: 80 MMHG | HEIGHT: 64 IN | TEMPERATURE: 98 F | RESPIRATION RATE: 18 BRPM | OXYGEN SATURATION: 99 %

## 2023-03-06 DIAGNOSIS — G89.29 CHRONIC MIDLINE LOW BACK PAIN WITHOUT SCIATICA: ICD-10-CM

## 2023-03-06 DIAGNOSIS — K21.9 GASTROESOPHAGEAL REFLUX DISEASE WITHOUT ESOPHAGITIS: ICD-10-CM

## 2023-03-06 DIAGNOSIS — Z12.31 SCREENING MAMMOGRAM, ENCOUNTER FOR: ICD-10-CM

## 2023-03-06 DIAGNOSIS — M54.50 CHRONIC MIDLINE LOW BACK PAIN WITHOUT SCIATICA: ICD-10-CM

## 2023-03-06 DIAGNOSIS — Z00.00 ADULT GENERAL MEDICAL EXAMINATION: Primary | ICD-10-CM

## 2023-03-06 DIAGNOSIS — F41.9 ANXIETY: ICD-10-CM

## 2023-03-06 DIAGNOSIS — F43.21 GRIEF: ICD-10-CM

## 2023-03-06 DIAGNOSIS — E78.00 HYPERCHOLESTEROLEMIA: ICD-10-CM

## 2023-03-06 PROCEDURE — 3074F SYST BP LT 130 MM HG: CPT | Performed by: NURSE PRACTITIONER

## 2023-03-06 PROCEDURE — 3079F DIAST BP 80-89 MM HG: CPT | Performed by: NURSE PRACTITIONER

## 2023-03-06 PROCEDURE — 99395 PREV VISIT EST AGE 18-39: CPT | Performed by: NURSE PRACTITIONER

## 2023-03-06 PROCEDURE — 3008F BODY MASS INDEX DOCD: CPT | Performed by: NURSE PRACTITIONER

## 2023-03-10 ENCOUNTER — HOSPITAL ENCOUNTER (OUTPATIENT)
Age: 38
Discharge: HOME | End: 2023-03-10
Payer: COMMERCIAL

## 2023-03-10 VITALS
DIASTOLIC BLOOD PRESSURE: 42 MMHG | RESPIRATION RATE: 16 BRPM | TEMPERATURE: 98 F | OXYGEN SATURATION: 100 % | HEART RATE: 85 BPM | SYSTOLIC BLOOD PRESSURE: 142 MMHG

## 2023-03-10 DIAGNOSIS — J06.9 VIRAL UPPER RESPIRATORY TRACT INFECTION: ICD-10-CM

## 2023-03-10 DIAGNOSIS — Z20.822 ENCOUNTER FOR LABORATORY TESTING FOR COVID-19 VIRUS: Primary | ICD-10-CM

## 2023-03-10 LAB
S PYO AG THROAT QL: NEGATIVE
SARS-COV-2 RNA RESP QL NAA+PROBE: NOT DETECTED

## 2023-03-10 PROCEDURE — 99213 OFFICE O/P EST LOW 20 MIN: CPT

## 2023-03-10 PROCEDURE — U0002 COVID-19 LAB TEST NON-CDC: HCPCS

## 2023-03-10 PROCEDURE — 87880 STREP A ASSAY W/OPTIC: CPT

## 2023-03-10 RX ORDER — BENZONATATE 100 MG/1
100 CAPSULE ORAL 3 TIMES DAILY PRN
Qty: 30 CAPSULE | Refills: 0 | Status: SHIPPED | OUTPATIENT
Start: 2023-03-10 | End: 2023-04-09

## 2023-03-10 NOTE — ED INITIAL ASSESSMENT (HPI)
Pt presents with cough and congestion, headache x 5 days. Cough is productive. Sore throat x 24 hours. Pt reports home Covid Antigen test last night negative.

## 2023-03-10 NOTE — DISCHARGE INSTRUCTIONS
Strep test is negative. COVID test is negative  There are no signs of infection on physical exam.  This is likely a viral illness. I sent a prescription for Tessalon Perles for the cough, please remember these may make you drowsy so do not drive or drink alcohol when you take them. Please be sure to drink plenty of fluids, use Tylenol and Motrin for pain or fever. Use Flonase and Mucinex for congestion. If you develop any respiratory complaints, fever that does not improve with medications or any other concerning complaints you should go to the emergency department. Otherwise follow up with your primary care provider.

## 2023-03-22 NOTE — TELEPHONE ENCOUNTER
History  Chief Complaint   Patient presents with   • Vomiting     Mother reports multiple family members have stomach flu, pt has diarrhea, dizziness, no urine output for 12 hours     Patient is a 9year-old male with no significant past medical history who presented with vomiting and diarrhea  Per patient's mom, patient started vomiting about 3 days ago and then began to have diarrhea shortly afterwards  His sister and grandmother are sick at home with similar symptoms  He has had several episodes of nonbloody vomiting and nonbloody diarrhea  She called her pediatrician today who recommended that they come to the ED to be evaluated because patient has not urinated in 12 hours  Patient has been wearing a diaper because of his diarrhea  She has been giving him Tylenol and Motrin with relief of his headache however the headache returns  The headache is located along his forehead  He only has a mild headache at the moment  He is up-to-date on childhood vaccinations  He attends   He was born full-term  Prior to Admission Medications   Prescriptions Last Dose Informant Patient Reported? Taking? azithromycin (ZITHROMAX) 200 mg/5 mL suspension   No No   Sig: Give the patient 160 mg (4 ml) by mouth the first day then 80 mg (2 ml) by mouth daily for 4 days  Patient not taking: Reported on 2/1/2023   cetirizine (ZyrTEC) oral solution   No No   Sig: Take 5 mL (5 mg total) by mouth daily as needed (seasonal allergies)   Patient not taking: Reported on 4/8/2022       Facility-Administered Medications: None       Past Medical History:   Diagnosis Date   • Allergic    • Allergic rhinitis    • Eczema    • History of croup        Past Surgical History:   Procedure Laterality Date   • CIRCUMCISION         Family History   Problem Relation Age of Onset   • No Known Problems Mother    • No Known Problems Father      I have reviewed and agree with the history as documented      E-Cigarette/Vaping Reviewed doctor's recommendations with pt. Also advised pt to call back next week to schedule appt if no improvement in symptoms. Pt agreed with plan of care and had no further questions at this time. E-Cigarette/Vaping Substances     Social History     Tobacco Use   • Smoking status: Never   • Smokeless tobacco: Never        Review of Systems   Constitutional: Negative for chills and fever  HENT: Negative for ear pain and sore throat  Eyes: Negative for pain and visual disturbance  Respiratory: Negative for cough and shortness of breath  Cardiovascular: Negative for chest pain and palpitations  Gastrointestinal: Positive for abdominal pain, diarrhea and vomiting  Genitourinary: Negative for dysuria and hematuria  Musculoskeletal: Negative for back pain and neck stiffness  Skin: Negative for color change and rash  Neurological: Negative for dizziness and syncope  All other systems reviewed and are negative  Physical Exam  ED Triage Vitals [03/22/23 1002]   Temperature Pulse Respirations Blood Pressure SpO2   98 9 °F (37 2 °C) 114 20 (!) 168/111 99 %      Temp src Heart Rate Source Patient Position - Orthostatic VS BP Location FiO2 (%)   Oral Monitor Sitting Right arm --      Pain Score       --             Orthostatic Vital Signs  Vitals:    03/22/23 1002 03/22/23 1147   BP: (!) 168/111 (!) 88/52   Pulse: 114 106   Patient Position - Orthostatic VS: Sitting        Physical Exam  Vitals and nursing note reviewed  Constitutional:       General: He is active  He is not in acute distress  HENT:      Head: Normocephalic and atraumatic  Right Ear: Tympanic membrane normal       Left Ear: Tympanic membrane normal       Nose: No congestion or rhinorrhea  Mouth/Throat:      Mouth: Mucous membranes are moist    Eyes:      General:         Right eye: No discharge  Left eye: No discharge  Extraocular Movements: Extraocular movements intact  Conjunctiva/sclera: Conjunctivae normal    Cardiovascular:      Rate and Rhythm: Normal rate and regular rhythm  Heart sounds: S1 normal and S2 normal  No murmur heard    Pulmonary:      Effort: Pulmonary effort is normal  No respiratory distress  Breath sounds: Normal breath sounds  No wheezing, rhonchi or rales  Abdominal:      General: Bowel sounds are normal       Palpations: Abdomen is soft  Tenderness: There is no abdominal tenderness  Genitourinary:     Penis: Normal     Musculoskeletal:         General: No swelling  Normal range of motion  Cervical back: Neck supple  Lymphadenopathy:      Cervical: No cervical adenopathy  Skin:     General: Skin is warm and dry  Capillary Refill: Capillary refill takes less than 2 seconds  Findings: No rash  Neurological:      Mental Status: He is alert  Sensory: No sensory deficit  Motor: No weakness  Psychiatric:         Mood and Affect: Mood normal          ED Medications  Medications   ondansetron (ZOFRAN) oral solution 1 752 mg (1 752 mg Oral Given 3/22/23 1038)   dicyclomine (BENTYL) oral solution 10 mg (10 mg Oral Given 3/22/23 1146)       Diagnostic Studies  Results Reviewed     None                 No orders to display         Procedures  Procedures      ED Course  ED Course as of 03/22/23 1954   Wed Mar 22, 2023   1135 Patient has urinated while in the ED  Patient states that his symptoms have improved following the Zofran  Will PO challenge and re-evaluate  Medical Decision Making  Patient is a 9year-old male with no significant past medical history who presented with vomiting and diarrhea  Patient's symptoms are most concerning for viral gastrointestinal illness  Patient has known sick contacts at home with similar symptoms  Less likely bacterial given lack of blood in bowel movements  Will give patient Zofran and Bentyl for symptomatic management  Will then p o  challenge  Patient was able to tolerate liquids and urinate  Patient states that his symptoms have resolved  A prescription for Zofran and Bentyl was sent to the patient's pharmacy    Patient's mom was told to follow-up with his pediatrician  She was given return precautions and he was discharged from ED  Diarrhea: acute illness or injury  Vomiting: acute illness or injury  Amount and/or Complexity of Data Reviewed  Independent Historian: parent  External Data Reviewed: notes  Risk  Prescription drug management  Disposition  Final diagnoses:   Vomiting   Diarrhea     Time reflects when diagnosis was documented in both MDM as applicable and the Disposition within this note     Time User Action Codes Description Comment    3/22/2023 12:08 PM Rip Sonali [R11 10] Vomiting     3/22/2023 12:08 PM Vladimir Wang [R19 7] Diarrhea       ED Disposition     ED Disposition   Discharge    Condition   Stable    Date/Time   Wed Mar 22, 2023 12:08 PM    Comment   Inderjit Tamezcholo Allen discharge to home/self care  Follow-up Information     Follow up With Specialties Details Why DO [facility]   [address] [facility] [address] [facility]  [phone]            Discharge Medication List as of 3/22/2023 12:11 PM      START taking these medications    Details   dicyclomine (BENTYL) 10 mg/5 mL oral solution Take 5 mL (10 mg total) by mouth 4 (four) times a day (before meals and at bedtime) for 3 days, Starting Wed 3/22/2023, Until Sat 3/25/2023, Normal      ondansetron (ZOFRAN) 4 MG/5ML solution Take 2 2 mL (1 75 mg total) by mouth 2 (two) times a day as needed for nausea or vomiting for up to 6 doses, Starting Wed 3/22/2023, Normal         CONTINUE these medications which have NOT CHANGED    Details   azithromycin (ZITHROMAX) 200 mg/5 mL suspension Give the patient 160 mg (4 ml) by mouth the first day then 80 mg (2 ml) by mouth daily for 4 days  , Normal      cetirizine (ZyrTEC) oral solution Take 5 mL (5 mg total) by mouth daily as needed (seasonal allergies), Starting Wed 12/15/2021, Normal           No discharge procedures on file      PDMP Review     None           ED Provider  Attending physically available and evaluated Selinaberry Rossir  I managed the patient along with the ED Attending      Electronically Signed by         Sarahy Rosario MD  03/22/23 9560

## 2023-03-26 ENCOUNTER — PATIENT MESSAGE (OUTPATIENT)
Dept: FAMILY MEDICINE CLINIC | Facility: CLINIC | Age: 38
End: 2023-03-26

## 2023-03-26 DIAGNOSIS — M51.27 LUMBOSACRAL DISC HERNIATION: ICD-10-CM

## 2023-03-26 DIAGNOSIS — M54.50 ACUTE MIDLINE LOW BACK PAIN WITHOUT SCIATICA: ICD-10-CM

## 2023-03-27 RX ORDER — NAPROXEN 500 MG/1
TABLET ORAL
Qty: 28 TABLET | Refills: 0 | Status: SHIPPED | OUTPATIENT
Start: 2023-03-27

## 2023-03-27 NOTE — TELEPHONE ENCOUNTER
A refill request was received for:  Requested Prescriptions     Pending Prescriptions Disp Refills    NAPROXEN 500 MG Oral Tab [Pharmacy Med Name: Naproxen 500 Mg Tab Jose] 28 tablet 0     Sig: Take 1 tablet by mouth in the morning and 1 tablet  in the evening. Take with meals. Do all this for 28 days.      Last refill date:  11/28/2022  Qty: 28  Dx: Acute midline low back pain without sciatica, lumbosacral disc herniation  Last office visit: 03/06/2023  When is follow up due: 03/06/2024     Future Appointments   Date Time Provider Tracee Sheridan   8/29/2023 11:45 AM Aniya Dozier [facility]   3/11/2024 11:30 AM SUSANA Salas [id number] [address] [facility]

## 2023-04-07 ENCOUNTER — LAB ENCOUNTER (OUTPATIENT)
Dept: LAB | Facility: HOSPITAL | Age: 38
End: 2023-04-07
Attending: NURSE PRACTITIONER
Payer: COMMERCIAL

## 2023-04-07 ENCOUNTER — PATIENT MESSAGE (OUTPATIENT)
Dept: FAMILY MEDICINE CLINIC | Facility: CLINIC | Age: 38
End: 2023-04-07

## 2023-04-07 DIAGNOSIS — B35.1 ONYCHOMYCOSIS: ICD-10-CM

## 2023-04-07 DIAGNOSIS — Z00.00 ADULT GENERAL MEDICAL EXAMINATION: ICD-10-CM

## 2023-04-07 DIAGNOSIS — R79.89 ABNORMAL CBC: Primary | ICD-10-CM

## 2023-04-07 DIAGNOSIS — R74.8 ELEVATED ALKALINE PHOSPHATASE LEVEL: ICD-10-CM

## 2023-04-07 LAB
ALBUMIN SERPL-MCNC: 4 G/DL (ref 3.4–5)
ALBUMIN/GLOB SERPL: 1 {RATIO} (ref 1–2)
ALP LIVER SERPL-CCNC: 141 U/L
ALT SERPL-CCNC: 29 U/L
ANION GAP SERPL CALC-SCNC: 7 MMOL/L (ref 0–18)
AST SERPL-CCNC: 21 U/L (ref 15–37)
BASOPHILS # BLD AUTO: 0.07 X10(3) UL (ref 0–0.2)
BASOPHILS NFR BLD AUTO: 1 %
BILIRUB DIRECT SERPL-MCNC: <0.1 MG/DL (ref 0–0.2)
BILIRUB SERPL-MCNC: 0.4 MG/DL (ref 0.1–2)
BUN BLD-MCNC: 13 MG/DL (ref 7–18)
BUN/CREAT SERPL: 17.3 (ref 10–20)
CALCIUM BLD-MCNC: 8.8 MG/DL (ref 8.5–10.1)
CHLORIDE SERPL-SCNC: 107 MMOL/L (ref 98–112)
CHOLEST SERPL-MCNC: 200 MG/DL (ref ?–200)
CO2 SERPL-SCNC: 26 MMOL/L (ref 21–32)
CREAT BLD-MCNC: 0.75 MG/DL
DEPRECATED RDW RBC AUTO: 38.4 FL (ref 35.1–46.3)
EOSINOPHIL # BLD AUTO: 0.08 X10(3) UL (ref 0–0.7)
EOSINOPHIL NFR BLD AUTO: 1.2 %
ERYTHROCYTE [DISTWIDTH] IN BLOOD BY AUTOMATED COUNT: 12.9 % (ref 11–15)
EST. AVERAGE GLUCOSE BLD GHB EST-MCNC: 105 MG/DL (ref 68–126)
FASTING PATIENT LIPID ANSWER: NO
FASTING STATUS PATIENT QL REPORTED: NO
GFR SERPLBLD BASED ON 1.73 SQ M-ARVRAT: 104 ML/MIN/1.73M2 (ref 60–?)
GLOBULIN PLAS-MCNC: 4.1 G/DL (ref 2.8–4.4)
GLUCOSE BLD-MCNC: 79 MG/DL (ref 70–99)
HBA1C MFR BLD: 5.3 % (ref ?–5.7)
HCT VFR BLD AUTO: 38.8 %
HDLC SERPL-MCNC: 50 MG/DL (ref 40–59)
HGB BLD-MCNC: 11.8 G/DL
IMM GRANULOCYTES # BLD AUTO: 0.01 X10(3) UL (ref 0–1)
IMM GRANULOCYTES NFR BLD: 0.1 %
LDLC SERPL CALC-MCNC: 126 MG/DL (ref ?–100)
LYMPHOCYTES # BLD AUTO: 3.84 X10(3) UL (ref 1–4)
LYMPHOCYTES NFR BLD AUTO: 55.3 %
MCH RBC QN AUTO: 24.9 PG (ref 26–34)
MCHC RBC AUTO-ENTMCNC: 30.4 G/DL (ref 31–37)
MCV RBC AUTO: 82 FL
MONOCYTES # BLD AUTO: 0.52 X10(3) UL (ref 0.1–1)
MONOCYTES NFR BLD AUTO: 7.5 %
NEUTROPHILS # BLD AUTO: 2.43 X10 (3) UL (ref 1.5–7.7)
NEUTROPHILS # BLD AUTO: 2.43 X10(3) UL (ref 1.5–7.7)
NEUTROPHILS NFR BLD AUTO: 34.9 %
NONHDLC SERPL-MCNC: 150 MG/DL (ref ?–130)
OSMOLALITY SERPL CALC.SUM OF ELEC: 289 MOSM/KG (ref 275–295)
PLATELET # BLD AUTO: 457 10(3)UL (ref 150–450)
POTASSIUM SERPL-SCNC: 3.8 MMOL/L (ref 3.5–5.1)
PROT SERPL-MCNC: 8.1 G/DL (ref 6.4–8.2)
RBC # BLD AUTO: 4.73 X10(6)UL
SODIUM SERPL-SCNC: 140 MMOL/L (ref 136–145)
TRIGL SERPL-MCNC: 135 MG/DL (ref 30–149)
TSI SER-ACNC: 2.11 MIU/ML (ref 0.36–3.74)
VLDLC SERPL CALC-MCNC: 24 MG/DL (ref 0–30)
WBC # BLD AUTO: 7 X10(3) UL (ref 4–11)

## 2023-04-07 PROCEDURE — 80061 LIPID PANEL: CPT | Performed by: NURSE PRACTITIONER

## 2023-04-07 PROCEDURE — 83036 HEMOGLOBIN GLYCOSYLATED A1C: CPT | Performed by: NURSE PRACTITIONER

## 2023-04-07 PROCEDURE — 80050 GENERAL HEALTH PANEL: CPT | Performed by: NURSE PRACTITIONER

## 2023-04-13 NOTE — TELEPHONE ENCOUNTER
She should not donate blood at this time. Repeat labs as recommended in 1 month (see lab results review). Can see me then.

## 2023-04-18 NOTE — TELEPHONE ENCOUNTER
Lamisil can raise liver function tests in about 1% of patients per article I found. It appears to most often affect AST/ALT but can affect alk phos, which is elevated for her. If she chooses to start it she would need to have her liver function tests monitored regularly. I typically order that once per month but I will defer to ordering provider in this case.

## 2023-05-05 ENCOUNTER — LAB ENCOUNTER (OUTPATIENT)
Dept: LAB | Facility: HOSPITAL | Age: 38
End: 2023-05-05
Attending: NURSE PRACTITIONER
Payer: COMMERCIAL

## 2023-05-05 DIAGNOSIS — R79.89 ABNORMAL CBC: ICD-10-CM

## 2023-05-05 DIAGNOSIS — R74.8 ELEVATED ALKALINE PHOSPHATASE LEVEL: ICD-10-CM

## 2023-05-05 LAB
BASOPHILS # BLD AUTO: 0.08 X10(3) UL (ref 0–0.2)
BASOPHILS NFR BLD AUTO: 1.2 %
DEPRECATED HBV CORE AB SER IA-ACNC: 4.4 NG/ML
DEPRECATED RDW RBC AUTO: 41.9 FL (ref 35.1–46.3)
EOSINOPHIL # BLD AUTO: 0.07 X10(3) UL (ref 0–0.7)
EOSINOPHIL NFR BLD AUTO: 1.1 %
ERYTHROCYTE [DISTWIDTH] IN BLOOD BY AUTOMATED COUNT: 14.3 % (ref 11–15)
HCT VFR BLD AUTO: 39.3 %
HGB BLD-MCNC: 12.2 G/DL
IMM GRANULOCYTES # BLD AUTO: 0.01 X10(3) UL (ref 0–1)
IMM GRANULOCYTES NFR BLD: 0.2 %
IRON SATN MFR SERPL: 8 %
IRON SERPL-MCNC: 43 UG/DL
LYMPHOCYTES # BLD AUTO: 3.13 X10(3) UL (ref 1–4)
LYMPHOCYTES NFR BLD AUTO: 47.9 %
MCH RBC QN AUTO: 24.9 PG (ref 26–34)
MCHC RBC AUTO-ENTMCNC: 31 G/DL (ref 31–37)
MCV RBC AUTO: 80.4 FL
MONOCYTES # BLD AUTO: 0.46 X10(3) UL (ref 0.1–1)
MONOCYTES NFR BLD AUTO: 7 %
NEUTROPHILS # BLD AUTO: 2.79 X10 (3) UL (ref 1.5–7.7)
NEUTROPHILS # BLD AUTO: 2.79 X10(3) UL (ref 1.5–7.7)
NEUTROPHILS NFR BLD AUTO: 42.6 %
PLATELET # BLD AUTO: 434 10(3)UL (ref 150–450)
RBC # BLD AUTO: 4.89 X10(6)UL
TIBC SERPL-MCNC: 519 UG/DL (ref 240–450)
TRANSFERRIN SERPL-MCNC: 348 MG/DL (ref 200–360)
WBC # BLD AUTO: 6.5 X10(3) UL (ref 4–11)

## 2023-05-05 PROCEDURE — 83540 ASSAY OF IRON: CPT | Performed by: NURSE PRACTITIONER

## 2023-05-05 PROCEDURE — 84080 ASSAY ALKALINE PHOSPHATASES: CPT | Performed by: NURSE PRACTITIONER

## 2023-05-05 PROCEDURE — 82728 ASSAY OF FERRITIN: CPT | Performed by: NURSE PRACTITIONER

## 2023-05-05 PROCEDURE — 85025 COMPLETE CBC W/AUTO DIFF WBC: CPT | Performed by: NURSE PRACTITIONER

## 2023-05-05 PROCEDURE — 84466 ASSAY OF TRANSFERRIN: CPT | Performed by: NURSE PRACTITIONER

## 2023-05-08 ENCOUNTER — OFFICE VISIT (OUTPATIENT)
Dept: FAMILY MEDICINE CLINIC | Facility: CLINIC | Age: 38
End: 2023-05-08
Payer: COMMERCIAL

## 2023-05-08 VITALS
DIASTOLIC BLOOD PRESSURE: 86 MMHG | HEART RATE: 86 BPM | OXYGEN SATURATION: 99 % | TEMPERATURE: 98 F | WEIGHT: 180 LBS | HEIGHT: 64 IN | BODY MASS INDEX: 30.73 KG/M2 | SYSTOLIC BLOOD PRESSURE: 124 MMHG | RESPIRATION RATE: 16 BRPM

## 2023-05-08 DIAGNOSIS — R74.8 ELEVATED ALKALINE PHOSPHATASE LEVEL: ICD-10-CM

## 2023-05-08 DIAGNOSIS — E61.1 IRON DEFICIENCY: Primary | ICD-10-CM

## 2023-05-08 PROCEDURE — 3079F DIAST BP 80-89 MM HG: CPT | Performed by: NURSE PRACTITIONER

## 2023-05-08 PROCEDURE — 99213 OFFICE O/P EST LOW 20 MIN: CPT | Performed by: NURSE PRACTITIONER

## 2023-05-08 PROCEDURE — 3074F SYST BP LT 130 MM HG: CPT | Performed by: NURSE PRACTITIONER

## 2023-05-08 PROCEDURE — 3008F BODY MASS INDEX DOCD: CPT | Performed by: NURSE PRACTITIONER

## 2023-05-08 RX ORDER — DEXMETHYLPHENIDATE HYDROCHLORIDE 30 MG/1
30 CAPSULE, EXTENDED RELEASE ORAL DAILY
COMMUNITY

## 2023-05-10 LAB — ALKALINE PHOSPHATASE BONE SPECIFIC: 24.8 UG/L

## 2023-05-11 DIAGNOSIS — R74.8 ELEVATED ALKALINE PHOSPHATASE LEVEL: Primary | ICD-10-CM

## 2023-05-24 VITALS — HEIGHT: 65 IN

## 2023-06-01 ENCOUNTER — APPOINTMENT (OUTPATIENT)
Dept: OBGYN | Age: 38
End: 2023-06-01

## 2023-06-03 DIAGNOSIS — M51.27 LUMBOSACRAL DISC HERNIATION: ICD-10-CM

## 2023-06-03 DIAGNOSIS — M54.50 ACUTE MIDLINE LOW BACK PAIN WITHOUT SCIATICA: ICD-10-CM

## 2023-06-05 RX ORDER — NAPROXEN 500 MG/1
TABLET ORAL
Qty: 28 TABLET | Refills: 0 | Status: SHIPPED | OUTPATIENT
Start: 2023-06-05

## 2023-06-05 NOTE — TELEPHONE ENCOUNTER
A refill request was received for:  Requested Prescriptions     Pending Prescriptions Disp Refills    NAPROXEN 500 MG Oral Tab [Pharmacy Med Name: Naproxen 500 Mg Tab Auro] 28 tablet 0     Sig: Take 1 tablet by mouth in the morning and 1 tablet  in the evening. Take with meals.      Last refill date:  03/27/2023  Qty: 28  Dx: Acute midline low back pain without sciatica, lumbosacral disc herniation   Last office visit: 05/08/2023  When is follow up due: 08/08/2023     For hormone prescriptions, date of last blood test for rx being requested:    Future Appointments   Date Time Provider Tracee Sheridan   8/29/2023 11:45 AM Karen Groves PA-C [facility]   3/11/2024 11:30 AM SUSANA Posadas [id number]

## 2023-07-06 ENCOUNTER — OFFICE VISIT (OUTPATIENT)
Dept: OBGYN | Age: 38
End: 2023-07-06

## 2023-07-06 VITALS
TEMPERATURE: 98.1 F | BODY MASS INDEX: 29.44 KG/M2 | OXYGEN SATURATION: 97 % | HEART RATE: 81 BPM | HEIGHT: 66 IN | DIASTOLIC BLOOD PRESSURE: 86 MMHG | WEIGHT: 183.2 LBS | SYSTOLIC BLOOD PRESSURE: 122 MMHG

## 2023-07-06 DIAGNOSIS — Z01.419 ENCOUNTER FOR WELL WOMAN EXAM WITH ROUTINE GYNECOLOGICAL EXAM: Primary | ICD-10-CM

## 2023-07-06 DIAGNOSIS — Z12.31 SCREENING MAMMOGRAM FOR BREAST CANCER: ICD-10-CM

## 2023-07-06 PROCEDURE — 99385 PREV VISIT NEW AGE 18-39: CPT | Performed by: OBSTETRICS & GYNECOLOGY

## 2023-07-06 RX ORDER — NAPROXEN 500 MG/1
TABLET ORAL
COMMUNITY
Start: 2023-06-05

## 2023-07-06 RX ORDER — BUSPIRONE HYDROCHLORIDE 10 MG/1
TABLET ORAL
COMMUNITY
Start: 2023-07-05

## 2023-07-06 RX ORDER — DEXMETHYLPHENIDATE HYDROCHLORIDE 30 MG/1
30 CAPSULE, EXTENDED RELEASE ORAL EVERY MORNING
COMMUNITY
Start: 2023-06-26

## 2023-08-31 ENCOUNTER — HOSPITAL ENCOUNTER (OUTPATIENT)
Age: 38
Discharge: HOME | End: 2023-08-31
Payer: COMMERCIAL

## 2023-08-31 ENCOUNTER — NURSE TRIAGE (OUTPATIENT)
Dept: FAMILY MEDICINE CLINIC | Facility: CLINIC | Age: 38
End: 2023-08-31

## 2023-08-31 VITALS
HEART RATE: 90 BPM | SYSTOLIC BLOOD PRESSURE: 135 MMHG | TEMPERATURE: 99 F | RESPIRATION RATE: 18 BRPM | OXYGEN SATURATION: 100 % | DIASTOLIC BLOOD PRESSURE: 85 MMHG

## 2023-08-31 DIAGNOSIS — U07.1 COVID-19: Primary | ICD-10-CM

## 2023-08-31 LAB
POCT INFLUENZA A: NEGATIVE
POCT INFLUENZA B: NEGATIVE
S PYO AG THROAT QL: NEGATIVE
SARS-COV-2 RNA RESP QL NAA+PROBE: DETECTED

## 2023-08-31 PROCEDURE — 87880 STREP A ASSAY W/OPTIC: CPT | Performed by: NURSE PRACTITIONER

## 2023-08-31 PROCEDURE — U0002 COVID-19 LAB TEST NON-CDC: HCPCS | Performed by: NURSE PRACTITIONER

## 2023-08-31 PROCEDURE — 87502 INFLUENZA DNA AMP PROBE: CPT | Performed by: NURSE PRACTITIONER

## 2023-08-31 PROCEDURE — 99213 OFFICE O/P EST LOW 20 MIN: CPT | Performed by: NURSE PRACTITIONER

## 2023-08-31 RX ORDER — MELATONIN
325
COMMUNITY

## 2023-08-31 NOTE — TELEPHONE ENCOUNTER
S/w Rosita covid test 8/29/23 negative. Pt stated she is taking ibuprofen due to headache with nasal congestion and myalgias. Pt agreed to Office evaluation but no in-office appts available. Pt directed to walk-in or UC evaluation today. Pt advised if have worsening symptoms including increased shortness of breath or develop chest discomfort to divert to the ED. Pt agreed to plan and voiced understanding. Reason for Disposition   MILD difficulty breathing (e.g., minimal/no SOB at rest, SOB with walking, pulse <100) and still present when not coughing    Answer Assessment - Initial Assessment Questions  1. ONSET: \"When did the cough begin? \"       8/27/23  2. SEVERITY: \"How bad is the cough today? \"       Worsening more frequent. 3. SPUTUM: \"Describe the color of your sputum\" (none, dry cough; clear, white, yellow, green)      Yes, unknown color with chest congestion. Possible yellow color. 4. HEMOPTYSIS: \"Are you coughing up any blood? \" If so ask: \"How much? \" (flecks, streaks, tablespoons, etc.)      Denied  5. DIFFICULTY BREATHING: Chan Spainers you having difficulty breathing? \" If Yes, ask: \"How bad is it? \" (e.g., mild, moderate, severe)     - MILD: No SOB at rest, mild SOB with walking, speaks normally in sentences, can lie down, no retractions, pulse < 100.     - MODERATE: SOB at rest, SOB with minimal exertion and prefers to sit, cannot lie down flat, speaks in phrases, mild retractions, audible wheezing, pulse 100-120.     - SEVERE: Very SOB at rest, speaks in single words, struggling to breathe, sitting hunched forward, retractions, pulse > 120       Mild exertional waiting up stairs. 6. FEVER: \"Do you have a fever? \" If Yes, ask: \"What is your temperature, how was it measured, and when did it start? \"      Denied but has chills with sweating last night. 7. CARDIAC HISTORY: \"Do you have any history of heart disease? \" (e.g., heart attack, congestive heart failure)       Denied  8.  LUNG HISTORY: \"Do you have any history of lung disease? \"  (e.g., pulmonary embolus, asthma, emphysema)      Denied  9. PE RISK FACTORS: \"Do you have a history of blood clots? \" (or: recent major surgery, recent prolonged travel, bedridden)      Denied  10. OTHER SYMPTOMS: \"Do you have any other symptoms? \" (e.g., runny nose, wheezing, chest pain)        Denined  11. PREGNANCY: \"Is there any chance you are pregnant? \" \"When was your last menstrual period? \"        Denined  12. TRAVEL: \"Have you traveled out of the country in the last month? \" (e.g., travel history, exposures)        Denied    Protocols used: [id number]

## 2023-09-05 ENCOUNTER — TELEMEDICINE (OUTPATIENT)
Dept: FAMILY MEDICINE CLINIC | Facility: CLINIC | Age: 38
End: 2023-09-05
Payer: COMMERCIAL

## 2023-09-05 DIAGNOSIS — U07.1 COVID-19: Primary | ICD-10-CM

## 2023-09-05 DIAGNOSIS — M54.50 ACUTE MIDLINE LOW BACK PAIN WITHOUT SCIATICA: ICD-10-CM

## 2023-09-05 DIAGNOSIS — M51.27 LUMBOSACRAL DISC HERNIATION: ICD-10-CM

## 2023-09-05 PROCEDURE — 99214 OFFICE O/P EST MOD 30 MIN: CPT | Performed by: STUDENT IN AN ORGANIZED HEALTH CARE EDUCATION/TRAINING PROGRAM

## 2023-09-05 RX ORDER — NAPROXEN 500 MG/1
500 TABLET ORAL 2 TIMES DAILY WITH MEALS
Qty: 28 TABLET | Refills: 0 | Status: SHIPPED | OUTPATIENT
Start: 2023-09-05

## 2023-09-05 RX ORDER — OMEPRAZOLE 20 MG/1
20 CAPSULE, DELAYED RELEASE ORAL
COMMUNITY

## 2023-09-05 NOTE — PROGRESS NOTES
This is a telemedicine visit with live, interactive video and audio. Patient understands and accepts financial responsibility for any deductible, co-insurance and/or co-pays associated with this service. SUBJECTIVE    75-year-old female calling to follow-up after positive COVID infection. Patient was seen in immediate care on 8/31/2023 for productive cough and rapid COVID testing was positive. Patient states that her symptoms started on 8/29/2023 with severe body aches and started improving on 9/1/2023 into 9/2/2023.  3 days ago also noted some decreased smell and taste. Temperature at home around 98-99 F. This was patient's first COVID infection. Vaccinated x3 with last vaccine being September 2022. Denies shortness of breath, difficulty breathing, chest pain, ear pain. Patient has history of chronic lower back pain and now states mildly worsened after the body aches. HISTORY:  Past Medical History:   Diagnosis Date    Acid reflux     Breast disorder     Hypoglycemia     Migraines     Acid Reflux    Post partum depression     Postpartum anxiety     Visual impairment       History reviewed. No pertinent surgical history.    Family History   Problem Relation Age of Onset    Lipids Father         hyperlipidema    Hypertension Father     Lipids Mother         hyperlipidemia    Hypertension Mother     Heart Disease Mother     Stroke Paternal Grandmother         CVA (stroke)    Other (Other) Paternal Grandmother         NO DX IN NG, JUST 'OTHER'    Alcohol and Other Disorders Associated Paternal Grandfather         alcoholism    Colon Cancer Paternal Grandfather     Cancer Paternal Grandfather         lung--patient stated only colon ca    Allergies Sister     Asthma Other         cousin    Breast Cancer Paternal Aunt       Social History     Socioeconomic History    Marital status:    Tobacco Use    Smoking status: Never    Smokeless tobacco: Never   Vaping Use    Vaping Use: Never used Substance and Sexual Activity    Alcohol use: Yes     Comment: rarely    Drug use: No   Other Topics Concern    Caffeine Concern Yes     Comment: soda, tea - 2-3 cups per day    Exercise No   Social History Narrative    The patient does not use an assistive device. .      The patient does live in a home with stairs.     3 children (10 yo, 5yo and 16 mo - 6/2021)    Work - FT, insurance          Adhesive Tape               Comment:Other reaction(s): ADHESIVE BANDAGE  Latex                       Comment:Other reaction(s): Rash  Ritalin [Methylphen*    Coughing   Current Outpatient Medications   Medication Sig Dispense Refill    omeprazole 20 MG Oral Capsule Delayed Release Take 1 capsule (20 mg total) by mouth every morning before breakfast.      naproxen 500 MG Oral Tab Take 1 tablet (500 mg total) by mouth 2 (two) times daily with meals. 28 tablet 0    ferrous sulfate 325 (65 FE) MG Oral Tab EC Take 1 tablet (325 mg total) by mouth daily with breakfast.      BLACK ELDERBERRY OR Take by mouth. Dexmethylphenidate HCl ER 30 MG Oral Capsule SR 24 Hr Take 1 capsule (30 mg total) by mouth daily. busPIRone 10 MG Oral Tab Take 1 tablet (10 mg total) by mouth 2 (two) times daily. Ciclopirox 8 % External Solution Apply to affected toenails once daily 1 each 3    Cetirizine HCl (ZYRTEC ALLERGY) 10 MG Oral Cap       Nutritional Supplements (VITAMIN D BOOSTER OR) As Directed. OBJECTIVE  Physical Exam:   alert, appears stated age, cooperative, and no distress, Speaking in full sentences comfortably, and Normal work of breathing    ASSESSMENT & PLAN  1. COVID-19 (Primary)  Symptoms started on 8/29/2023. Rapid COVID positive on 8/31/2023. Improving with no complications. No SOB, difficulty breathing or chest pain.  -Wear a high quality mask if you must be around others at home and in public.  -Supportive care  2. Lumbosacral disc herniation  Chronic.  -Medication risks discussed.   Take as needed with food. -     Naproxen; Take 1 tablet (500 mg total) by mouth 2 (two) times daily with meals. Dispense: 28 tablet; Refill: 0  3. Acute midline low back pain without sciatica  Chronic.  -Medication risks discussed. Take as needed with food. -     Naproxen; Take 1 tablet (500 mg total) by mouth 2 (two) times daily with meals. Dispense: 28 tablet;  Refill: 0      Gabi Hitchcock MD

## 2023-09-08 ENCOUNTER — PATIENT MESSAGE (OUTPATIENT)
Dept: FAMILY MEDICINE CLINIC | Facility: CLINIC | Age: 38
End: 2023-09-08

## 2023-09-08 DIAGNOSIS — M54.50 CHRONIC MIDLINE LOW BACK PAIN WITHOUT SCIATICA: Primary | ICD-10-CM

## 2023-09-08 DIAGNOSIS — G89.29 CHRONIC MIDLINE LOW BACK PAIN WITHOUT SCIATICA: Primary | ICD-10-CM

## 2023-09-08 NOTE — TELEPHONE ENCOUNTER
Pt has received only mild relief from prescribed naproxen from 9/5/23 telemedicine appointment. Pt is requesting a new referral for evaluation since did not like the prior physician Dr. Jocelynn Frost. Pt also inquiring about how soon after covid infection is it addressed to go for lab tests previously ordered? Please advise.

## 2023-09-08 NOTE — TELEPHONE ENCOUNTER
From: Carlyn Noble  To: SUSANA Rivas  Sent: 9/8/2023 11:02 AM CDT  Subject: Major Back Pain Again & Q on labs    Hi Dr. Long Bailey,  I saw Dr. Kalen Gardner on Tuesday as a follow up to my positive covid diagnosis. I told him about my severe back pain and he sent in the Naproxen Rx which I started Tuesday night; it is better with the medication but not significantly so. I've been getting better about my diet and working to start an exercise routine but I think it may be best to start some physical therapy again to jump start particularly with this current severe pain. My  thinks I need to consider surgery because of how often it gets bad and how often I'm in pain. I didn't like the doctor I went to last time and I know we discussed that shortly in my annual appt in March. Is there another group you would recommend? Also - I didn't get my labs done before I got Covid last week - how long should I wait for those? Once I know I'll plan to get the labs and make that follow up appointment with that I know I'm overdue for. Thanks!   Rosanne Grimaldo

## 2023-09-09 NOTE — TELEPHONE ENCOUNTER
I referred her to Dr. Sy Pan, neurosurgeon, and PT. If she needs additional medication or discussion about back pain she can follow-up. She should wait at least 2 weeks after COVID resolves to get labs.

## 2023-09-12 ENCOUNTER — PATIENT MESSAGE (OUTPATIENT)
Dept: FAMILY MEDICINE CLINIC | Facility: CLINIC | Age: 38
End: 2023-09-12

## 2023-09-12 DIAGNOSIS — M51.9 LUMBOSACRAL DISC DISEASE: Primary | ICD-10-CM

## 2023-09-14 ENCOUNTER — TELEPHONE (OUTPATIENT)
Dept: FAMILY MEDICINE CLINIC | Facility: CLINIC | Age: 38
End: 2023-09-14

## 2023-09-15 ENCOUNTER — TELEPHONE (OUTPATIENT)
Dept: FAMILY MEDICINE CLINIC | Facility: CLINIC | Age: 38
End: 2023-09-15

## 2023-09-15 ENCOUNTER — OFFICE VISIT (OUTPATIENT)
Dept: FAMILY MEDICINE CLINIC | Facility: CLINIC | Age: 38
End: 2023-09-15
Payer: COMMERCIAL

## 2023-09-15 VITALS
WEIGHT: 181 LBS | SYSTOLIC BLOOD PRESSURE: 125 MMHG | HEART RATE: 82 BPM | BODY MASS INDEX: 30.9 KG/M2 | HEIGHT: 64 IN | DIASTOLIC BLOOD PRESSURE: 86 MMHG

## 2023-09-15 DIAGNOSIS — M54.50 CHRONIC MIDLINE LOW BACK PAIN WITHOUT SCIATICA: ICD-10-CM

## 2023-09-15 DIAGNOSIS — M51.27 LUMBOSACRAL DISC HERNIATION: Primary | ICD-10-CM

## 2023-09-15 DIAGNOSIS — G89.29 CHRONIC MIDLINE LOW BACK PAIN WITHOUT SCIATICA: ICD-10-CM

## 2023-09-15 PROCEDURE — 3008F BODY MASS INDEX DOCD: CPT | Performed by: STUDENT IN AN ORGANIZED HEALTH CARE EDUCATION/TRAINING PROGRAM

## 2023-09-15 PROCEDURE — 3079F DIAST BP 80-89 MM HG: CPT | Performed by: STUDENT IN AN ORGANIZED HEALTH CARE EDUCATION/TRAINING PROGRAM

## 2023-09-15 PROCEDURE — 3074F SYST BP LT 130 MM HG: CPT | Performed by: STUDENT IN AN ORGANIZED HEALTH CARE EDUCATION/TRAINING PROGRAM

## 2023-09-15 PROCEDURE — 99214 OFFICE O/P EST MOD 30 MIN: CPT | Performed by: STUDENT IN AN ORGANIZED HEALTH CARE EDUCATION/TRAINING PROGRAM

## 2023-09-22 ENCOUNTER — HOSPITAL ENCOUNTER (OUTPATIENT)
Dept: MRI IMAGING | Age: 38
Discharge: HOME | End: 2023-09-22
Attending: STUDENT IN AN ORGANIZED HEALTH CARE EDUCATION/TRAINING PROGRAM
Payer: COMMERCIAL

## 2023-09-22 DIAGNOSIS — M54.50 CHRONIC MIDLINE LOW BACK PAIN WITHOUT SCIATICA: ICD-10-CM

## 2023-09-22 DIAGNOSIS — G89.29 CHRONIC MIDLINE LOW BACK PAIN WITHOUT SCIATICA: ICD-10-CM

## 2023-09-22 DIAGNOSIS — M51.27 LUMBOSACRAL DISC HERNIATION: ICD-10-CM

## 2023-09-22 PROCEDURE — 72148 MRI LUMBAR SPINE W/O DYE: CPT | Performed by: STUDENT IN AN ORGANIZED HEALTH CARE EDUCATION/TRAINING PROGRAM

## 2023-09-30 ENCOUNTER — LAB ENCOUNTER (OUTPATIENT)
Dept: LAB | Facility: HOSPITAL | Age: 38
End: 2023-09-30
Attending: NURSE PRACTITIONER
Payer: COMMERCIAL

## 2023-09-30 DIAGNOSIS — R74.8 ELEVATED ALKALINE PHOSPHATASE LEVEL: ICD-10-CM

## 2023-09-30 DIAGNOSIS — E61.1 IRON DEFICIENCY: ICD-10-CM

## 2023-09-30 LAB
ALBUMIN SERPL-MCNC: 3.9 G/DL (ref 3.4–5)
ALBUMIN/GLOB SERPL: 0.9 {RATIO} (ref 1–2)
ALP LIVER SERPL-CCNC: 134 U/L
ALT SERPL-CCNC: 26 U/L
ANION GAP SERPL CALC-SCNC: 7 MMOL/L (ref 0–18)
AST SERPL-CCNC: 18 U/L (ref 15–37)
BASOPHILS # BLD AUTO: 0.09 X10(3) UL (ref 0–0.2)
BASOPHILS NFR BLD AUTO: 1.2 %
BILIRUB SERPL-MCNC: 0.5 MG/DL (ref 0.1–2)
BUN BLD-MCNC: 9 MG/DL (ref 7–18)
BUN/CREAT SERPL: 10.7 (ref 10–20)
CALCIUM BLD-MCNC: 9.2 MG/DL (ref 8.5–10.1)
CHLORIDE SERPL-SCNC: 105 MMOL/L (ref 98–112)
CO2 SERPL-SCNC: 26 MMOL/L (ref 21–32)
CREAT BLD-MCNC: 0.84 MG/DL
DEPRECATED HBV CORE AB SER IA-ACNC: 50.5 NG/ML
DEPRECATED RDW RBC AUTO: 40.2 FL (ref 35.1–46.3)
EGFRCR SERPLBLD CKD-EPI 2021: 91 ML/MIN/1.73M2 (ref 60–?)
EOSINOPHIL # BLD AUTO: 0.09 X10(3) UL (ref 0–0.7)
EOSINOPHIL NFR BLD AUTO: 1.2 %
ERYTHROCYTE [DISTWIDTH] IN BLOOD BY AUTOMATED COUNT: 12.4 % (ref 11–15)
FASTING STATUS PATIENT QL REPORTED: YES
GLOBULIN PLAS-MCNC: 4.3 G/DL (ref 2.8–4.4)
GLUCOSE BLD-MCNC: 88 MG/DL (ref 70–99)
HCT VFR BLD AUTO: 43.8 %
HGB BLD-MCNC: 14.9 G/DL
IMM GRANULOCYTES # BLD AUTO: 0.02 X10(3) UL (ref 0–1)
IMM GRANULOCYTES NFR BLD: 0.3 %
IRON SATN MFR SERPL: 23 %
IRON SERPL-MCNC: 91 UG/DL
LYMPHOCYTES # BLD AUTO: 3.31 X10(3) UL (ref 1–4)
LYMPHOCYTES NFR BLD AUTO: 45.3 %
MCH RBC QN AUTO: 30.1 PG (ref 26–34)
MCHC RBC AUTO-ENTMCNC: 34 G/DL (ref 31–37)
MCV RBC AUTO: 88.5 FL
MONOCYTES # BLD AUTO: 0.52 X10(3) UL (ref 0.1–1)
MONOCYTES NFR BLD AUTO: 7.1 %
NEUTROPHILS # BLD AUTO: 3.28 X10 (3) UL (ref 1.5–7.7)
NEUTROPHILS # BLD AUTO: 3.28 X10(3) UL (ref 1.5–7.7)
NEUTROPHILS NFR BLD AUTO: 44.9 %
OSMOLALITY SERPL CALC.SUM OF ELEC: 284 MOSM/KG (ref 275–295)
PLATELET # BLD AUTO: 377 10(3)UL (ref 150–450)
POTASSIUM SERPL-SCNC: 3.7 MMOL/L (ref 3.5–5.1)
PROT SERPL-MCNC: 8.2 G/DL (ref 6.4–8.2)
RBC # BLD AUTO: 4.95 X10(6)UL
SODIUM SERPL-SCNC: 138 MMOL/L (ref 136–145)
TIBC SERPL-MCNC: 392 UG/DL (ref 240–450)
TRANSFERRIN SERPL-MCNC: 263 MG/DL (ref 200–360)
WBC # BLD AUTO: 7.3 X10(3) UL (ref 4–11)

## 2023-09-30 PROCEDURE — 83540 ASSAY OF IRON: CPT | Performed by: NURSE PRACTITIONER

## 2023-09-30 PROCEDURE — 85025 COMPLETE CBC W/AUTO DIFF WBC: CPT | Performed by: NURSE PRACTITIONER

## 2023-09-30 PROCEDURE — 84466 ASSAY OF TRANSFERRIN: CPT | Performed by: NURSE PRACTITIONER

## 2023-09-30 PROCEDURE — 80053 COMPREHEN METABOLIC PANEL: CPT | Performed by: NURSE PRACTITIONER

## 2023-09-30 PROCEDURE — 82728 ASSAY OF FERRITIN: CPT | Performed by: NURSE PRACTITIONER

## 2023-10-02 ENCOUNTER — OFFICE VISIT (OUTPATIENT)
Dept: FAMILY MEDICINE CLINIC | Facility: CLINIC | Age: 38
End: 2023-10-02
Payer: COMMERCIAL

## 2023-10-02 VITALS
RESPIRATION RATE: 16 BRPM | BODY MASS INDEX: 30.93 KG/M2 | TEMPERATURE: 98 F | SYSTOLIC BLOOD PRESSURE: 122 MMHG | OXYGEN SATURATION: 98 % | HEART RATE: 72 BPM | WEIGHT: 181.19 LBS | HEIGHT: 64 IN | DIASTOLIC BLOOD PRESSURE: 84 MMHG

## 2023-10-02 DIAGNOSIS — R05.2 SUBACUTE COUGH: Primary | ICD-10-CM

## 2023-10-02 DIAGNOSIS — Z86.16 HISTORY OF COVID-19: ICD-10-CM

## 2023-10-02 DIAGNOSIS — R74.8 ELEVATED ALKALINE PHOSPHATASE LEVEL: ICD-10-CM

## 2023-10-02 PROCEDURE — 3079F DIAST BP 80-89 MM HG: CPT | Performed by: NURSE PRACTITIONER

## 2023-10-02 PROCEDURE — 99213 OFFICE O/P EST LOW 20 MIN: CPT | Performed by: NURSE PRACTITIONER

## 2023-10-02 PROCEDURE — 3074F SYST BP LT 130 MM HG: CPT | Performed by: NURSE PRACTITIONER

## 2023-10-02 PROCEDURE — 3008F BODY MASS INDEX DOCD: CPT | Performed by: NURSE PRACTITIONER

## 2023-10-02 RX ORDER — BENZONATATE 100 MG/1
100 CAPSULE ORAL 3 TIMES DAILY PRN
Qty: 30 CAPSULE | Refills: 0 | Status: SHIPPED | OUTPATIENT
Start: 2023-10-02

## 2023-10-02 RX ORDER — FERROUS SULFATE 325(65) MG
325 TABLET ORAL
COMMUNITY

## 2023-10-17 ENCOUNTER — ORDER TRANSCRIPTION (OUTPATIENT)
Dept: PHYSICAL THERAPY | Facility: HOSPITAL | Age: 38
End: 2023-10-17

## 2023-10-17 DIAGNOSIS — M54.17 RADICULOPATHY, LUMBOSACRAL REGION: ICD-10-CM

## 2023-10-17 DIAGNOSIS — M51.26 OTHER INTERVERTEBRAL DISC DISPLACEMENT, LUMBAR REGION: Primary | ICD-10-CM

## 2023-10-17 DIAGNOSIS — R29.3 ABNORMAL POSTURE: ICD-10-CM

## 2023-10-17 DIAGNOSIS — M79.18 MYALGIA, OTHER SITE: ICD-10-CM

## 2023-10-30 ENCOUNTER — TELEPHONE (OUTPATIENT)
Dept: PHYSICAL THERAPY | Facility: HOSPITAL | Age: 38
End: 2023-10-30

## 2023-11-01 ENCOUNTER — OFFICE VISIT (OUTPATIENT)
Dept: PHYSICAL THERAPY | Age: 38
End: 2023-11-01
Attending: PHYSICAL MEDICINE & REHABILITATION
Payer: COMMERCIAL

## 2023-11-01 DIAGNOSIS — M79.18 MYALGIA, OTHER SITE: ICD-10-CM

## 2023-11-01 DIAGNOSIS — M54.17 RADICULOPATHY, LUMBOSACRAL REGION: ICD-10-CM

## 2023-11-01 DIAGNOSIS — R29.3 ABNORMAL POSTURE: ICD-10-CM

## 2023-11-01 DIAGNOSIS — M51.26 OTHER INTERVERTEBRAL DISC DISPLACEMENT, LUMBAR REGION: Primary | ICD-10-CM

## 2023-11-01 PROCEDURE — 97162 PT EVAL MOD COMPLEX 30 MIN: CPT | Performed by: PHYSICAL THERAPIST

## 2023-11-01 PROCEDURE — 97110 THERAPEUTIC EXERCISES: CPT | Performed by: PHYSICAL THERAPIST

## 2023-11-01 NOTE — PATIENT INSTRUCTIONS
Patient was instructed in and issued a HEP for pronelying to extension in pronelying x 1-2 mins each 1-2x/day (prior to sleeping at night); repeated lumbar extension in pronelying or in standing 10 reps 4-6x/day; posture correction in sitting using a lumbar roll (recommended); avoidance of sitting on a couch/bed.

## 2023-11-08 ENCOUNTER — OFFICE VISIT (OUTPATIENT)
Dept: PHYSICAL THERAPY | Age: 38
End: 2023-11-08
Attending: PHYSICAL MEDICINE & REHABILITATION
Payer: COMMERCIAL

## 2023-11-08 PROCEDURE — 97110 THERAPEUTIC EXERCISES: CPT | Performed by: PHYSICAL THERAPIST

## 2023-11-08 NOTE — PROGRESS NOTES
Date: 11/8/2023         Dx: Other intervertebral disc displacement, lumbar region (M51.26)  Radiculopathy, lumbosacral region (M54.17)  Abnormal posture (R29.3)  Myalgia, other site (M79.18)           Authorized # of Visits:  8       Referring MD:  Jacque William MD visit: none scheduled    Medication Changes since last visit?: No    Subjective: Elisa Stephens report that she is better but still feel an ache in the middle of the back. She did not feel any (L) LE symptoms over the weekend. She is busy with her family and work but despite this she was able to do her HEP (REIL or [place]) 2-3x/day. Objective:   ROM: (Pre-test symptom: 0/10 (L) lower back and LE)  Lumbar        Movement Loss Pain (+/-)   Flexion Minimal loss P, NW tight hamstrings   Extension Moderate-Minimal loss P, NW mid back ache   R Sideglide Nil loss P, NW mid back ache   L Sideglide Minimal loss NE stretch opposite side     Date: 11/8/2023  Tx#: 2/8 Date: Tx#: 3/ Date: Tx#: 4/ Date: Tx#: 5/ Date: Tx#: 6/ Date: Tx#: 7/ Date: Tx#: 8/   Scifit UE only L5 fwd/bkwd x 5 mins ea; NE     Pronelying x 1 min; NE (relaxed back)     REIL x 8 reps; NE     + self OP (sag) x 2 reps; NE    REIL with belt OP at pelvis x 10 reps; NE    Sustained lumbar extension with belt OP at pelvis x 3 + 3 mins (higher angle); NE pressure mid back    REIL with sheet OP x 10 reps; NE    BERT over rail x 10 reps; NE    BERT hands on wall x 10 reps; NE    Sitting posture correction with lumbar roll           Assessment/HEP:   Elisa Stephens continue to respond to lumbar extension directional preference. Her lumbar AROM increased to nil loss in all directions without production of symptoms. She was instructed on adding overpressure to REIL (sag or sheet) and to BERT (rail/counter or hands on wall) 10 reps 3x/day. Reinforced posture correction using a lumbar roll to augment her improvement. All questions and concerns were answered and addressed at this time.       Goals:   (8 visits)  1. Patient to consistently perform her HEP and it's progression to maintain her improved condition. 2. Patient to have reduced, centralized, and abolished symptoms in the low back to enable easier ADLs, functional, work, and recreational activities. 3. Patient to have WNL of lumbar mobility in all directions to be able to bend down, rise up from sitting, lay down on her back, wake up in the morning, do yardwork (weeding), carry her 2 y/o, sit on the couch, lean forward from the trunk, and dishwash without producing or increasing symptoms in the (L) lower back , buttock, posterior thigh upto the knee. 4. Patient to consistently have good posture to promote her symptomfree condition. Plan:   Re-assess next session and continue with directional preference exercise, posture correction, patient education, and HEP progression. Charges:  TherEx x 3       Total Timed Treatment: 47 mins Total Treatment Time: 48 mins

## 2023-11-15 ENCOUNTER — HOSPITAL ENCOUNTER (OUTPATIENT)
Dept: MAMMOGRAPHY | Facility: HOSPITAL | Age: 38
Discharge: HOME | End: 2023-11-15
Attending: NURSE PRACTITIONER
Payer: COMMERCIAL

## 2023-11-15 ENCOUNTER — OFFICE VISIT (OUTPATIENT)
Dept: PHYSICAL THERAPY | Age: 38
End: 2023-11-15
Attending: PHYSICAL MEDICINE & REHABILITATION
Payer: COMMERCIAL

## 2023-11-15 DIAGNOSIS — Z12.31 SCREENING MAMMOGRAM, ENCOUNTER FOR: ICD-10-CM

## 2023-11-15 PROCEDURE — 77063 BREAST TOMOSYNTHESIS BI: CPT | Performed by: NURSE PRACTITIONER

## 2023-11-15 PROCEDURE — 97110 THERAPEUTIC EXERCISES: CPT | Performed by: PHYSICAL THERAPIST

## 2023-11-15 PROCEDURE — 77067 SCR MAMMO BI INCL CAD: CPT | Performed by: NURSE PRACTITIONER

## 2023-11-17 ENCOUNTER — OFFICE VISIT (OUTPATIENT)
Dept: PHYSICAL THERAPY | Age: 38
End: 2023-11-17
Attending: NURSE PRACTITIONER
Payer: COMMERCIAL

## 2023-11-17 PROCEDURE — 97110 THERAPEUTIC EXERCISES: CPT | Performed by: PHYSICAL THERAPIST

## 2023-11-17 NOTE — PROGRESS NOTES
Date: 11/17/2023         Dx: Other intervertebral disc displacement, lumbar region (M51.26)  Radiculopathy, lumbosacral region (M54.17)  Abnormal posture (R29.3)  Myalgia, other site (M79.18)           Authorized # of Visits:  8       Referring MD:  Amos William MD visit: none scheduled    Medication Changes since last visit?: No    Subjective: [facility] city report she is feeling much better in the back and neck. Her lower back just feels tight across and the neck is less painful and she can turn her head  to the (L) side more with less pain/ache. Objective: (+) (R) wry neck; (L) sided neck, shoulder, and posterior shoulder pain has reduced to 0-4/10. CROM: (L) Rotation and SB: minimal loss; Inc, NW,  Extension: moderate loss; Inc, NW.      ROM: (Pre-test symptom: 0/10 across lower back (L>R), no (L) LE symptoms)  Lumbar        Movement Loss Pain (+/-)   Flexion Nil loss NE tight back   Extension Nil loss NE   R Sideglide Minimal loss P, NW ache (L) lower back   L Sideglide Nil loss NE      Date: 11/8/2023  Tx#: 2/8 Date: 11/15/2023  Tx#: 3/8 Date: 11/17/2023  Tx#: 4/8 Date: Tx#: 5/ Date: Tx#: 6/ Date: Tx#: 7/ Date:    Tx#: 8/   Scifit UE only L5 fwd/bkwd x 5 mins ea; NE     Pronelying x 1 min; NE (relaxed back)     REIL x 8 reps; NE     + self OP (sag) x 2 reps; NE    REIL with belt OP at pelvis x 10 reps; NE    Sustained lumbar extension with belt OP at pelvis x 3 + 3 mins (higher angle); NE pressure mid back    REIL with sheet OP x 10 reps; NE    BERT over rail x 10 reps; NE    BERT hands on wall x 10 reps; NE    Sitting posture correction with lumbar roll Scifit UE only L4 fwd/bkwd x 5 mins ea; NE     Sitting posture correction with lumbar roll    C/s retraction 2 x 10 reps; P, NW (better) (L) neck     + self OP x 10 reps; P, NW (better) (L) shoulder    - increased c/s (L) rotation and SB to almost nil loss with minimal to no ache and extension to minimal loss and no ache    BERT hands on wall x 10 reps; Dec, A lower back ache    Seated: t/s extension x 10 reps; P, NW mid lower back    BERT over rail x 10 reps; NE (feels good)     - no ache lower back     Seated: c/s retraction with self OP x 10 reps; NE (minimal cueing to correct technique) TM: Retro walk @ 5% grade x 1.2 mph x 10 mins; NE postural training    BERT 2 x 10 reps; P, NW mid back stretch ache     - less ache lumbar (R) SG     REIL sag x 10 (8+2) reps; NE    REIL with sheet OP x 10 reps; NE stretch    - minimal to no ache (L) lower back during (R) SG test    Seated: c/s retraction with self OP 2 x 10 reps; NE     + c/s extension x 10 reps; P, NW (better) (L) neck       + lean back on chair x 10 reps; P, NW mid neck    BERT hands on wall x 10 reps; NE    Sitting posture correction using a lumbar roll         Assessment/HEP:   Miguel Duncan continue to respond to lumbar extension directional preference exercise. She has increased lumbar mobility in all directions without symptoms. She was responded better unloaded with self OP using sag or sheet technique. She was also taught c/s extension to relive ache. All questions and concerns were answered and addressed at this time. Goals:   (8 visits)  1. Patient to consistently perform her HEP and it's progression to maintain her improved condition. 2. Patient to have reduced, centralized, and abolished symptoms in the low back to enable easier ADLs, functional, work, and recreational activities. 3. Patient to have WNL of lumbar mobility in all directions to be able to bend down, rise up from sitting, lay down on her back, wake up in the morning, do yardwork (weeding), carry her 1 y/o, sit on the couch, lean forward from the trunk, and dishwash without producing or increasing symptoms in the (L) lower back , buttock, posterior thigh upto the knee. 4. Patient to consistently have good posture to promote her symptomfree condition.        Plan:   Re-assess next session and continue with directional preference exercise, posture correction, patient education, and HEP progression. Charges:  TherEx x 3       Total Timed Treatment: 47 mins Total Treatment Time: 48 mins

## 2023-11-22 ENCOUNTER — OFFICE VISIT (OUTPATIENT)
Dept: PHYSICAL THERAPY | Age: 38
End: 2023-11-22
Attending: PHYSICAL MEDICINE & REHABILITATION
Payer: COMMERCIAL

## 2023-11-22 PROCEDURE — 97110 THERAPEUTIC EXERCISES: CPT | Performed by: PHYSICAL THERAPIST

## 2023-11-22 NOTE — PROGRESS NOTES
Date: 11/22/2023         Dx: Other intervertebral disc displacement, lumbar region (M51.26)  Radiculopathy, lumbosacral region (M54.17)  Abnormal posture (R29.3)  Myalgia, other site (M79.18)           Authorized # of Visits:  8       Referring MD:  Adan William MD visit: none scheduled    Medication Changes since last visit?: No    Subjective: Nellie Hastings report she is feeling better in the neck and in the back. She has normal movement now in the neck without pain and his back is not hurting. She has been doing her HEP (REIL or BERT and neck retraction) regularly. She is also using a    Objective: (-) (R) wry neck; (L) sided neck, shoulder, and posterior shoulder pain has reduced to 0-4/10. CROM: (L) Rotation and SB: minimal loss; Inc, NW,  Extension: moderate loss; Inc, NW.      ROM: (Pre-test symptom: 0/10 across lower back (L>R), no (L) LE symptoms)  Lumbar        Movement Loss Pain (+/-)   Flexion Nil loss NE tight hamstrings   Extension Nil loss NE   R Sideglide Nil loss P, NW sore (L) lower back   L Sideglide Nil loss NE      Date: 11/8/2023  Tx#: 2/8 Date: 11/15/2023  Tx#: 3/8 Date: 11/17/2023  Tx#: 4/8 Date: 11/22/2023  Tx#: 5/8 Date: Tx#: 6/ Date: Tx#: 7/ Date:    Tx#: 8/   Scifit UE only L5 fwd/bkwd x 5 mins ea; NE     Pronelying x 1 min; NE (relaxed back)     REIL x 8 reps; NE     + self OP (sag) x 2 reps; NE    REIL with belt OP at pelvis x 10 reps; NE    Sustained lumbar extension with belt OP at pelvis x 3 + 3 mins (higher angle); NE pressure mid back    REIL with sheet OP x 10 reps; NE    BERT over rail x 10 reps; NE    BERT hands on wall x 10 reps; NE    Sitting posture correction with lumbar roll Scifit UE only L4 fwd/bkwd x 5 mins ea; NE     Sitting posture correction with lumbar roll    C/s retraction 2 x 10 reps; P, NW (better) (L) neck     + self OP x 10 reps; P, NW (better) (L) shoulder    - increased c/s (L) rotation and SB to almost nil loss with minimal to no ache and extension to minimal loss and no ache    BERT hands on wall x 10 reps; Dec, A lower back ache    Seated: t/s extension x 10 reps; P, NW mid lower back    BERT over rail x 10 reps; NE (feels good)     - no ache lower back     Seated: c/s retraction with self OP x 10 reps; NE (minimal cueing to correct technique) TM: Retro walk @ 5% grade x 1.2 mph x 10 mins; NE postural training    BERT 2 x 10 reps; P, NW mid back stretch ache     - less ache lumbar (R) SG     REIL sag x 10 (8+2) reps; NE    REIL with sheet OP x 10 reps; NE stretch    - minimal to no ache (L) lower back during (R) SG test    Seated: c/s retraction with self OP 2 x 10 reps; NE     + c/s extension x 10 reps; P, NW (better) (L) neck       + lean back on chair x 10 reps; P, NW mid neck    BERT hands on wall x 10 reps; NE    Sitting posture correction using a lumbar roll TM: Retro walk @ 5% grade x 1.2 mph x 10 mins; NE postural training    BERT over rail 2 x 10 reps; NE      - no change (R) SG symptom    Pronelying x 1 min; NE unloaded    REIL with belt OP at pelvis 3 x 10 reps; P, NW mid back     - less ache (L) lower back during (R) SG    Prone: c/s sustained extension sphinx position on knuckles x 1 min; NE     + c/s repeated extension to fingertips x 10 reps; P, NW mid neck stretch/ache    Prone: Alt LE lift x 10 reps; NE    Prone: (B) UE extension 10 reps x 10 cts ea; NE     Prone: (B) UE h.abduction 10 reps x 10 cts ea; NE    Prone: (B) UE scaption 10 reps x 10 cts ea; NE    REIL with belt OP at pelvis x 10 reps; P, NW mid back          Assessment/HEP:   Rosalba Bhatti continue to respond to lumbar extension directional preference with overpressure exercise. She has WNL on lumbar AROM in all directions with minimal ache produced during (R) SG. She was able to abolish her symptoms with REIL with belt OP. She responds better in the unloaded position. She was introduced with postural strengthening/stability exercises in pronelying.    She required cueing to correct form, posture, and exercise technique. He agreed and understand her treatment plan. All questions and concerns were answered and addressed at this time. Goals:   (8 visits)  1. Patient to consistently perform her HEP and it's progression to maintain her improved condition. 2. Patient to have reduced, centralized, and abolished symptoms in the low back to enable easier ADLs, functional, work, and recreational activities. 3. Patient to have WNL of lumbar mobility in all directions to be able to bend down, rise up from sitting, lay down on her back, wake up in the morning, do yardwork (weeding), carry her 2 y/o, sit on the couch, lean forward from the trunk, and dishwash without producing or increasing symptoms in the (L) lower back , buttock, posterior thigh upto the knee. 4. Patient to consistently have good posture to promote her symptomfree condition. Plan:   Re-assess next session and continue with directional preference exercise, posture correction, patient education, and HEP progression. Charges:  TherEx x 4       Total Timed Treatment: 54 mins Total Treatment Time: 56 mins

## 2023-11-29 ENCOUNTER — OFFICE VISIT (OUTPATIENT)
Dept: DERMATOLOGY CLINIC | Facility: CLINIC | Age: 38
End: 2023-11-29

## 2023-11-29 DIAGNOSIS — L70.0 ACNE VULGARIS: ICD-10-CM

## 2023-11-29 DIAGNOSIS — L30.9 DERMATITIS: ICD-10-CM

## 2023-11-29 DIAGNOSIS — L81.4 LENTIGO: ICD-10-CM

## 2023-11-29 DIAGNOSIS — L82.1 SK (SEBORRHEIC KERATOSIS): ICD-10-CM

## 2023-11-29 DIAGNOSIS — D22.9 MULTIPLE NEVI: Primary | ICD-10-CM

## 2023-11-29 RX ORDER — CLINDAMYCIN PHOSPHATE 10 UG/ML
1 LOTION TOPICAL 2 TIMES DAILY
Qty: 60 ML | Refills: 5 | Status: SHIPPED | OUTPATIENT
Start: 2023-11-29

## 2023-11-29 RX ORDER — BUSPIRONE HYDROCHLORIDE 5 MG/1
5 TABLET ORAL 2 TIMES DAILY
COMMUNITY
Start: 2023-11-21

## 2023-11-29 RX ORDER — PREDNISOLONE ACETATE 10 MG/ML
SUSPENSION/ DROPS OPHTHALMIC
COMMUNITY
Start: 2023-10-30

## 2023-11-29 RX ORDER — ERYTHROMYCIN 5 MG/G
OINTMENT OPHTHALMIC
COMMUNITY
Start: 2023-11-17

## 2023-11-29 RX ORDER — TRIAMCINOLONE ACETONIDE 1 MG/G
1 CREAM TOPICAL 2 TIMES DAILY
Qty: 80 G | Refills: 2 | Status: SHIPPED | OUTPATIENT
Start: 2023-11-29

## 2023-11-29 RX ORDER — CLINDAMYCIN PHOSPHATE 10 UG/ML
1 LOTION TOPICAL 2 TIMES DAILY
Qty: 60 ML | Refills: 5 | Status: SHIPPED | OUTPATIENT
Start: 2023-11-29 | End: 2023-11-29

## 2023-11-29 NOTE — PROGRESS NOTES
Dane Greenwood is a 34year old female. HPI:     CC:    Chief Complaint   Patient presents with    Full Skin Exam     No hx of skin ca. LOV  8/2022 NK. Pt presents for full body exam.  Lesion of concern to the left shoulder, scabs at times with itchiness. Skin tags to the neck. Warts     Bilateral hand and right arm, no current tx. Allergies:  Adhesive tape, Latex, and Ritalin [methylphenidate]    HISTORY:    Past Medical History:   Diagnosis Date    Acid reflux     Breast disorder     Hypoglycemia     Migraines     Acid Reflux    Post partum depression     Postpartum anxiety     Visual impairment       History reviewed. No pertinent surgical history. Family History   Problem Relation Age of Onset    Uterine Cancer Mother     Lipids Mother         hyperlipidemia    Hypertension Mother     Heart Disease Mother     Lipids Father         hyperlipidema    Hypertension Father     Allergies Sister     Stroke Paternal Grandmother         CVA (stroke)    Other (Other) Paternal Grandmother         NO DX IN NG, JUST 'OTHER'    Alcohol and Other Disorders Associated Paternal Grandfather         alcoholism    Colon Cancer Paternal Grandfather     Cancer Paternal Grandfather         lung--patient stated only colon ca    Breast Cancer Paternal Aunt 54    Asthma Other         cousin      Social History     Socioeconomic History    Marital status:    Tobacco Use    Smoking status: Never    Smokeless tobacco: Never   Vaping Use    Vaping Use: Never used   Substance and Sexual Activity    Alcohol use: Yes     Comment: rarely    Drug use: No   Other Topics Concern    Caffeine Concern Yes     Comment: soda, tea - 2-3 cups per day    Exercise No    Grew up on a farm No    History of tanning Yes    Outdoor occupation No    Breast feeding No    Reaction to local anesthetic No    Pt has a pacemaker No    Pt has a defibrillator No   Social History Narrative    The patient does not use an assistive device. .      The patient does live in a home with stairs.     3 children (10 yo, 3yo and 16 mo - 6/2021)    Work - FT, insurance        Current Outpatient Medications   Medication Sig Dispense Refill    busPIRone 5 MG Oral Tab Take 1 tablet (5 mg total) by mouth 2 (two) times daily. prednisoLONE 1 % Ophthalmic Suspension Instill 1 drop into right eye four times a day      erythromycin 5 MG/GM Ophthalmic Ointment       triamcinolone 0.1 % External Cream Apply 1 Application topically 2 (two) times daily. To itchy irritated areas, areas of eczema 80 g 2    clindamycin 1 % External Lotion Apply 1 Application topically 2 (two) times daily. Apply thin film to affected area(s) for acne/ pimples 60 mL 5    Ferrous Sulfate 325 (65 Fe) MG Oral Tab Take 1 tablet (325 mg total) by mouth daily with breakfast.      benzonatate 100 MG Oral Cap Take 1 capsule (100 mg total) by mouth 3 (three) times daily as needed for cough. 30 capsule 0    omeprazole 20 MG Oral Capsule Delayed Release Take 1 capsule (20 mg total) by mouth every morning before breakfast.      naproxen 500 MG Oral Tab Take 1 tablet (500 mg total) by mouth 2 (two) times daily with meals. 28 tablet 0    BLACK ELDERBERRY OR Take by mouth. Dexmethylphenidate HCl ER 30 MG Oral Capsule SR 24 Hr Take 1 capsule (30 mg total) by mouth daily. busPIRone 10 MG Oral Tab Take 1 tablet (10 mg total) by mouth 2 (two) times daily. Ciclopirox 8 % External Solution Apply to affected toenails once daily 1 each 3    Cetirizine HCl (ZYRTEC ALLERGY) 10 MG Oral Cap       Nutritional Supplements (VITAMIN D BOOSTER OR) As Directed. Allergies:    Allergies   Allergen Reactions    Adhesive Tape      Other reaction(s): ADHESIVE BANDAGE    Latex      Other reaction(s): Rash    Ritalin [Methylphenidate] Coughing       Past Medical History:   Diagnosis Date    Acid reflux     Breast disorder     Hypoglycemia     Migraines     Acid Reflux    Post partum depression     Postpartum anxiety     Visual impairment      History reviewed. No pertinent surgical history. Social History     Socioeconomic History    Marital status:      Spouse name: Not on file    Number of children: Not on file    Years of education: Not on file    Highest education level: Not on file   Occupational History    Not on file   Tobacco Use    Smoking status: Never    Smokeless tobacco: Never   Vaping Use    Vaping Use: Never used   Substance and Sexual Activity    Alcohol use: Yes     Comment: rarely    Drug use: No    Sexual activity: Not on file   Other Topics Concern     Service Not Asked    Blood Transfusions Not Asked    Caffeine Concern Yes     Comment: soda, tea - 2-3 cups per day    Occupational Exposure Not Asked    Hobby Hazards Not Asked    Sleep Concern Not Asked    Stress Concern Not Asked    Weight Concern Not Asked    Special Diet Not Asked    Back Care Not Asked    Exercise No    Bike Helmet Not Asked    Seat Belt Not Asked    Self-Exams Not Asked    Grew up on a farm No    History of tanning Yes    Outdoor occupation No    Breast feeding No    Reaction to local anesthetic No    Pt has a pacemaker No    Pt has a defibrillator No   Social History Narrative    The patient does not use an assistive device. .      The patient does live in a home with stairs.         3 children (10 yo, 3yo and 16 mo - 6/2021)    Work - FT, insurance     Social Determinants of Health     Financial Resource Strain: Not on [employer] Insecurity: Not on file   Transportation Needs: Not on file   Physical Activity: Not on file   Stress: Not on file   Social Connections: Not on file   Housing Stability: Not on file     Family History   Problem Relation Age of Onset    Uterine Cancer Mother     Lipids Mother         hyperlipidemia    Hypertension Mother     Heart Disease Mother     Lipids Father         hyperlipidema    Hypertension Father     Allergies Sister     Stroke Paternal Grandmother         CVA (stroke) Other (Other) Paternal Grandmother         NO DX IN NG, JUST 'OTHER'    Alcohol and Other Disorders Associated Paternal Grandfather         alcoholism    Colon Cancer Paternal Grandfather     Cancer Paternal Grandfather         lung--patient stated only colon ca    Breast Cancer Paternal Aunt 53    Asthma Other         cousin       There were no vitals filed for this visit. HPI:  Chief Complaint   Patient presents with    Full Skin Exam     No hx of skin ca. LOV  8/2022 NK. Pt presents for full body exam.  Lesion of concern to the left shoulder, scabs at times with itchiness. Skin tags to the neck. Warts     Bilateral hand and right arm, no current tx. Follow-up     Patient presents with concerns above. Patient has been in their usual state of health. Past notes/ records and appropriate/relevant lab results including pathology and past body maps reviewed. Including outside notes/ PCP notes as appropriate. Updated and new information noted in current visit. ROS:  Denies other relevant systemic complaints. History, medications, allergies reviewed as noted. Physical Examination:     Well-developed well-nourished patient alert oriented in no acute distress. Exam performed, including scalp, head, neck, face,nails, hair, external eyes, including conjunctival mucosa, eyelids, lips external ears , arms, digits,palms. Multiple light to medium brown, well marginated, uniformly pigmented, macules and papules 6 mm and less scattered on exam. pigmented lesions examined with dermoscopy benign-appearing patterns. Waxy tannish keratotic papules scattered, cherry-red vascular papules scattered. See map today's date for lesions noted . See assessment and plan below for specific lesions. Otherwise remarkable for lesions as noted on map. See A/P  below for additional information:    Assessment / plan:    No orders of the defined types were placed in this encounter.       Meds & Refills for this Visit:  Requested Prescriptions     Signed Prescriptions Disp Refills    triamcinolone 0.1 % External Cream 80 g 2     Sig: Apply 1 Application topically 2 (two) times daily. To itchy irritated areas, areas of eczema    clindamycin 1 % External Lotion 60 mL 5     Sig: Apply 1 Application topically 2 (two) times daily. Apply thin film to affected area(s) for acne/ pimples         Encounter Diagnoses   Name Primary? Multiple nevi Yes    Acne vulgaris     Dermatitis     SK (seborrheic keratosis)     Lentigo        Acne. See medications in grid. Skin care regimen discussed at length including cleansers, makeup, face washing, sunscreen. Recheck in 6 -8 weeks if no improvement. Notify us promptly if problems tolerating regimen. Consider more aggressive therapy if not responding. Clindamycin lotion    Can dermatitis, topical steroids as needed. Dermatitis. Meds in grid. Skin care instructions reviewed. [place] use of emollients. Pathophysiology reviewed. Consider Contac allergy in differential.  Consider patch testing. Patient will let us know how they are doing over the next several weeks. Await clinical response to above therapies. History of reactions to shampoo versus cosmetic products. Consider Eucrisa patient currently using shampoo [place] [employer] which works fairly well.   patient with numerous allergies sensitive skin. Topical steroids if needed  Scattered eczematous patches over the shoulders monitor    Small skin tags discussed possible removal patient given monitoring presently    No other susupicious lesions on todays  exam.    Numerous benign-appearing nevi generalized reassurance regarding intradermal nevi of the face, keratoses over the arms  Benign nevi over the feet right fourth fifth toe,  Monitor benign-appearing on dermoscopy    Extensive lentigines, benign keratoses over the arms.     No other susupicious lesions on todays  exam.    Please refer to map for specific lesions. See additional diagnoses. Pros cons of various therapies, risks benefits discussed. Pathophysiology discussed with patient. Therapeutic options reviewed. See  Medications in grid. Instructions reviewed at length. Benign nevi, seborrheic  keratoses, cherry angiomas:  Reassurance regarding other benign skin lesions. Signs and symptoms of skin cancer, ABCDE's of melanoma discussed with patient. Sunscreen use, sun protection, self exams reviewed. Followup as noted RTC ---routine checkup    6 mos -one year or p.r.n. Encounter Times   Including precharting, reviewing chart, prior notes obtaining history: 10 minutes, medical exam :10 minutes, notes on body map, plan, counseling 10minutes My total time spent caring for the patient on the day of the encounter: 30 minutes     The patient indicates understanding of these issues and agrees to the plan. The patient is asked to return as noted in follow-up/ above. This note was generated using Dragon voice recognition software. Please contact me regarding any confusion resulting from errors in recognition. .  Note to patient and family: The [facility] makes medical notes like these available to patients. However, be advised this is a medical document. It is intended as [id number] communication and monitoring of a patient's care needs. It is written in medical language and may contain abbreviations or verbiage that are unfamiliar. It may appear blunt or direct. Medical documents are intended to carry relevant information, facts as evident and the clinical opinion of the practitioner.

## 2023-12-01 ENCOUNTER — OFFICE VISIT (OUTPATIENT)
Dept: PHYSICAL THERAPY | Age: 38
End: 2023-12-01
Attending: PHYSICAL MEDICINE & REHABILITATION
Payer: COMMERCIAL

## 2023-12-01 PROCEDURE — 97110 THERAPEUTIC EXERCISES: CPT | Performed by: PHYSICAL THERAPIST

## 2023-12-13 ENCOUNTER — APPOINTMENT (OUTPATIENT)
Dept: PHYSICAL THERAPY | Age: 38
End: 2023-12-13
Attending: PHYSICAL MEDICINE & REHABILITATION
Payer: COMMERCIAL

## 2023-12-20 ENCOUNTER — OFFICE VISIT (OUTPATIENT)
Dept: PHYSICAL THERAPY | Age: 38
End: 2023-12-20
Attending: PHYSICAL MEDICINE & REHABILITATION
Payer: COMMERCIAL

## 2023-12-20 PROCEDURE — 97110 THERAPEUTIC EXERCISES: CPT | Performed by: PHYSICAL THERAPIST

## 2023-12-20 NOTE — PROGRESS NOTES
Date: 12/20/2023         Dx: Other intervertebral disc displacement, lumbar region (M51.26)  Radiculopathy, lumbosacral region (M54.17)  Abnormal posture (R29.3)  Myalgia, other site (M79.18)           Authorized # of Visits:  8       Referring MD:  Sarahy Marion Next MD visit: none scheduled    Medication Changes since last visit?: No    Subjective: Jyoti Ansari report she has not been able to do her HEP (BERT or REIL) regularly since her last session because of her busy schedule. She is doing good in the back with a little ache now and then depending on her activity level. She is maintaining good posture in sitting as long as she can. She is also using a lumbar roll as often as she can. Objective:  ROM: (Pre-test symptom: 0/10 across lower back (L>R), no (L) LE symptoms)  Lumbar        Movement Loss Pain (+/-)   Flexion Nil loss NE tight hamstrings   Extension Nil loss NE   R Sideglide Nil loss P, NW (L) lower back   L Sideglide Nil loss NE      Date: 11/8/2023  Tx#: 2/8 Date: 11/15/2023  Tx#: 3/8 Date: 11/17/2023  Tx#: 4/8 Date: 11/22/2023  Tx#: 5/8 Date: 12/1/2023  Tx#: 6/8 Date: 12/20/223  Tx#: 7/8 Date:    Tx#: 8/   Scifit UE only L5 fwd/bkwd x 5 mins ea; NE     Pronelying x 1 min; NE (relaxed back)     REIL x 8 reps; NE     + self OP (sag) x 2 reps; NE    REIL with belt OP at pelvis x 10 reps; NE    Sustained lumbar extension with belt OP at pelvis x 3 + 3 mins (higher angle); NE pressure mid back    REIL with sheet OP x 10 reps; NE    BERT over rail x 10 reps; NE    BERT hands on wall x 10 reps; NE    Sitting posture correction with lumbar roll Scifit UE only L4 fwd/bkwd x 5 mins ea; NE     Sitting posture correction with lumbar roll    C/s retraction 2 x 10 reps; P, NW (better) (L) neck     + self OP x 10 reps; P, NW (better) (L) shoulder    - increased c/s (L) rotation and SB to almost nil loss with minimal to no ache and extension to minimal loss and no ache    BERT hands on wall x 10 reps; Dec, A lower back ache    Seated: t/s extension x 10 reps; P, NW mid lower back    BERT over rail x 10 reps; NE (feels good)     - no ache lower back     Seated: c/s retraction with self OP x 10 reps; NE (minimal cueing to correct technique) TM: Retro walk @ 5% grade x 1.2 mph x 10 mins; NE postural training    BERT 2 x 10 reps; P, NW mid back stretch ache     - less ache lumbar (R) SG     REIL sag x 10 (8+2) reps; NE    REIL with sheet OP x 10 reps; NE stretch    - minimal to no ache (L) lower back during (R) SG test    Seated: c/s retraction with self OP 2 x 10 reps; NE     + c/s extension x 10 reps; P, NW (better) (L) neck       + lean back on chair x 10 reps; P, NW mid neck    BERT hands on wall x 10 reps; NE    Sitting posture correction using a lumbar roll TM: Retro walk @ 5% grade x 1.2 mph x 10 mins; NE postural training    BERT over rail 2 x 10 reps; NE      - no change (R) SG symptom    Pronelying x 1 min; NE unloaded    REIL with belt OP at pelvis 3 x 10 reps; P, NW mid back     - less ache (L) lower back during (R) SG    Prone: c/s sustained extension sphinx position on knuckles x 1 min; NE     + c/s repeated extension to fingertips x 10 reps; P, NW mid neck stretch/ache    Prone: Alt LE lift x 10 reps; NE    Prone: (B) UE extension 10 reps x 10 cts ea; NE     Prone: (B) UE h.abduction 10 reps x 10 cts ea; NE    Prone: (B) UE scaption 10 reps x 10 cts ea; NE    REIL with belt OP at pelvis x 10 reps; P, NW mid back   TM: Retro walk @ 5% grade x 1.2 mph x 10 mins; NE postural training    BERT over rail 2 x 10 reps; NE     RFISitting knees @ 90 and 45 degs only x 10 reps; P, NW (L) posterior thigh tightness/ache    BERT x 10 reps; NE    Prone:  Alt LE lift with 2 lbs 2 x 10 reps; NE     Prone: (B) UE rhtymic-stab extension 1 lb 10 sets x 10 bounces ea; NE tired    Prone: (B) UE rhytmic-stab h.abduction 10 sets x 10 bounce ea; NE tired    Prone: c/s sustained extension sphinx position on knuckles x 1 min; NE     + c/s repeated extension to fingertips x 10 reps; P, NW mid neck stretch/ache    BERT over rail x 10 reps; NE        TM: Retro walk @ 5% grade x 1.2 mph x 10 mins; NE postural training    BERT over rail 2 x 10 reps; NE     - None to very little ache at endrange of (R) SG lumbar ROM    Pronelying x 1 min; NE unload back    REIL hands at level of head to extend thoracic spine with sag on last 2 reps x 10 reps; NE    Prone: (B) UE rhtymic-stab extension 1 lb 10 sets x 10 bounces ea; NE tired    Prone: (B) UE rhytmic-stab h.abduction 5 sets x 10 bounce ea; NE tired    Prone: (B) UE rhytmic-stab scaption 5 sets x 10 bounces ea; NE tired    Prone: plank on toes and forearms 5 reps x 5 secs ea; NE tired    [place] (R/L) LE with 1 kg ball 5 reps x 10 cts ea; NE tired    BERT over rail x 10 reps; NE      Assessment/HEP:   Paulette Conde continue to respond to lumbar extension directional preference with overpressure exercise. Her lumbar (R) SG test motion became symptomfree after this directional preference exercise. She was reminded to do her HEP (as above 10 reps 2x/day) regularly to maintain her improved condition. She was issued a copy of postural strengthening exercise and greenTB for home use. She understood and agreed with the treatment plan. All questions and concerns were answered and addressed at this time. Goals:   (8 visits)  1. Patient to consistently perform her HEP and it's progression to maintain her improved condition. 2. Patient to have reduced, centralized, and abolished symptoms in the low back to enable easier ADLs, functional, work, and recreational activities. 3. Patient to have WNL of lumbar mobility in all directions to be able to bend down, rise up from sitting, lay down on her back, wake up in the morning, do yardwork (weeding), carry her 1 y/o, sit on the couch, lean forward from the trunk, and dishwash without producing or increasing symptoms in the (L) lower back , buttock, posterior thigh upto the knee. 4. Patient to consistently have good posture to promote her symptomfree condition. Plan:   Re-assess next session and continue with directional preference exercise, posture correction, patient education, and HEP progression. Charges:  TherEx x 3       Total Timed Treatment: 49 mins Total Treatment Time:  50mins

## 2024-01-11 ENCOUNTER — TELEPHONE (OUTPATIENT)
Dept: PHYSICAL THERAPY | Facility: HOSPITAL | Age: 39
End: 2024-01-11

## 2024-01-12 ENCOUNTER — APPOINTMENT (OUTPATIENT)
Dept: PHYSICAL THERAPY | Age: 39
End: 2024-01-12
Attending: PHYSICAL MEDICINE & REHABILITATION
Payer: COMMERCIAL

## 2024-01-18 ENCOUNTER — LAB ENCOUNTER (OUTPATIENT)
Dept: LAB | Facility: HOSPITAL | Age: 39
End: 2024-01-18
Attending: NURSE PRACTITIONER
Payer: COMMERCIAL

## 2024-01-18 DIAGNOSIS — R74.8 ELEVATED ALKALINE PHOSPHATASE LEVEL: ICD-10-CM

## 2024-01-18 DIAGNOSIS — Z86.39 HISTORY OF IRON DEFICIENCY: ICD-10-CM

## 2024-01-18 LAB
ALBUMIN SERPL-MCNC: 4.4 G/DL (ref 3.2–4.8)
ALBUMIN/GLOB SERPL: 1.4 {RATIO} (ref 1–2)
ALP LIVER SERPL-CCNC: 116 U/L
ALT SERPL-CCNC: 20 U/L
ANION GAP SERPL CALC-SCNC: 5 MMOL/L (ref 0–18)
AST SERPL-CCNC: 20 U/L (ref ?–34)
BILIRUB SERPL-MCNC: 0.5 MG/DL (ref 0.3–1.2)
BUN BLD-MCNC: 9 MG/DL (ref 9–23)
BUN/CREAT SERPL: 11.8 (ref 10–20)
CALCIUM BLD-MCNC: 9.7 MG/DL (ref 8.7–10.4)
CHLORIDE SERPL-SCNC: 103 MMOL/L (ref 98–112)
CO2 SERPL-SCNC: 30 MMOL/L (ref 21–32)
CREAT BLD-MCNC: 0.76 MG/DL
EGFRCR SERPLBLD CKD-EPI 2021: 102 ML/MIN/1.73M2 (ref 60–?)
FASTING STATUS PATIENT QL REPORTED: NO
GLOBULIN PLAS-MCNC: 3.2 G/DL (ref 2.8–4.4)
GLUCOSE BLD-MCNC: 84 MG/DL (ref 70–99)
OSMOLALITY SERPL CALC.SUM OF ELEC: 284 MOSM/KG (ref 275–295)
POTASSIUM SERPL-SCNC: 3.8 MMOL/L (ref 3.5–5.1)
PROT SERPL-MCNC: 7.6 G/DL (ref 5.7–8.2)
SODIUM SERPL-SCNC: 138 MMOL/L (ref 136–145)

## 2024-01-18 PROCEDURE — 84466 ASSAY OF TRANSFERRIN: CPT

## 2024-01-18 PROCEDURE — 82728 ASSAY OF FERRITIN: CPT

## 2024-01-18 PROCEDURE — 36415 COLL VENOUS BLD VENIPUNCTURE: CPT

## 2024-01-18 PROCEDURE — 80053 COMPREHEN METABOLIC PANEL: CPT

## 2024-01-18 PROCEDURE — 84080 ASSAY ALKALINE PHOSPHATASES: CPT

## 2024-01-18 PROCEDURE — 83540 ASSAY OF IRON: CPT

## 2024-01-22 DIAGNOSIS — Z86.39 HISTORY OF IRON DEFICIENCY: Primary | ICD-10-CM

## 2024-01-22 LAB
DEPRECATED HBV CORE AB SER IA-ACNC: 55.3 NG/ML
IRON SATN MFR SERPL: 41 %
IRON SERPL-MCNC: 141 UG/DL
TIBC SERPL-MCNC: 343 UG/DL (ref 250–425)
TRANSFERRIN SERPL-MCNC: 230 MG/DL (ref 250–380)

## 2024-01-23 LAB — ALKALINE PHOSPHATASE BONE SPECIFIC: 20.3 UG/L

## 2024-01-24 DIAGNOSIS — M54.50 ACUTE MIDLINE LOW BACK PAIN WITHOUT SCIATICA: ICD-10-CM

## 2024-01-24 DIAGNOSIS — M51.27 LUMBOSACRAL DISC HERNIATION: ICD-10-CM

## 2024-01-24 RX ORDER — NAPROXEN 500 MG/1
500 TABLET ORAL 2 TIMES DAILY WITH MEALS
Qty: 28 TABLET | Refills: 0 | Status: SHIPPED | OUTPATIENT
Start: 2024-01-24

## 2024-01-24 NOTE — TELEPHONE ENCOUNTER
RN s/w patient.     Patient c/o back pain that has been going on for 2-3  years.     Patient requesting rx for naproxen. Pt is currently in physical therapy. Pt says that she has not been doing the exercises as much as she should be. Pt has been doing yoga, says she is unsure if this triggered the pain.     Patient says that she has been in more pain the last two days.     Patient is following up with a provider at [facility]. Patient has discussed potential surgery, however, is not at that point yet.       Pt denies loss of bowel or bladder control, shortness of breath, pre syncope, numbness in extremities, or chest pain at this time.       Pt advised to follow up with orthopedic provider at [facility] to discuss treatment plan and if yoga is ok for her to being doing.       ED precautions discussed with patient. Patient verbalized understanding and agreement with the plan.

## 2024-01-24 NOTE — TELEPHONE ENCOUNTER
A refill request was received for:  Requested Prescriptions     Pending Prescriptions Disp Refills    NAPROXEN 500 MG Oral Tab [Pharmacy Med Name: Naproxen 500 Mg Tab Auro] 28 tablet 0     Sig: Take 1 tablet by mouth in the morning and 1 tablet  in the evening. Take with meals.     Last refill date:  9/5/23  Qty: 28 tablet and 0   Dx: lower back pain  Last office visit: 10/2/23   When is follow up due: 4/2/24       Future Appointments   Date Time Provider Department Center   1/26/2024  1:00 PM Jose Robertson HND PT [facility]   3/11/2024 11:30 AM Sonia Fontenot APRN [id number] [facility]   12/2/2024 12:15 [facility]

## 2024-01-26 ENCOUNTER — OFFICE VISIT (OUTPATIENT)
Dept: PHYSICAL THERAPY | Age: 39
End: 2024-01-26
Attending: PHYSICAL MEDICINE & REHABILITATION
Payer: COMMERCIAL

## 2024-01-26 PROCEDURE — 97110 THERAPEUTIC EXERCISES: CPT | Performed by: PHYSICAL THERAPIST

## 2024-01-26 NOTE — PROGRESS NOTES
Date: 1/26/2024         Dx: Other intervertebral disc displacement, lumbar region (M51.26)  Radiculopathy, lumbosacral region (M54.17)  Abnormal posture (R29.3)  Myalgia, other site (M79.18)           Authorized # of Visits:  8       Referring MD:  BABAR Barger MD visit: none scheduled    Medication Changes since last visit?: No    Subjective: Rosita report she has more pain/ache in the lower back now.  She has been doing her BERT but not that regularly and she did a yoga class for few days.  Since she been feeling back pain she has been laying down prone more.  She feels better in the back from that position and she has worked her way to REIL.  She is better now but still sore and achy in the (L) lower back.    Objective:  ROM: (Pre-test symptom 3-4/10 across lower back (L>R), no (L) LE symptoms)  Lumbar        Movement Loss Pain (+/-)   Flexion Minimal loss Inc, NW and tight (L) leg   Extension Nil loss Dec, NB   R Sideglide Minimal loss NE tight   L Sideglide Nil loss NE      Assessment/HEP:   Rosita has attended 8 physical therapy sessions from 11/1/2023 to 1/26/2024.  She was responding to lower lumbar extension directional preference exercise up until her session today. She had WNL of lumbar AROM without producing symptoms.  In the past few of weeks she started doing yoga exercises and have been trying to incorporate her directional preference exercise (BERT or REIL) to her workout routine but has not been always consistent.  She started feeling pain/ache in the (L) lower back again. She was re-assessed today and she is presenting with an upper lumbar spine derangement with a directional preference toward repeated thoracic extension.  She was issued an added HEP of seated thoracic extension with lean back on chair for overpressure 10 reps 3x/day and to to continue with BERT or REIL 10 reps 3x/day.  She will benefit to continue with added physical therapy sessions to re-assess her symptoms and to progress her  directional preference exercises to attain all goals set during initial evaluation.     Goals:   (8 visits)  1. Patient to consistently perform her HEP and it's progression to maintain her improved condition.   2. Patient to have reduced, centralized, and abolished symptoms in the low back to enable easier ADLs, functional, work, and recreational activities.   3. Patient to have WNL of lumbar mobility in all directions to be able to bend down, rise up from sitting, lay down on her back, wake up in the morning, do yardwork (weeding), carry her 3 y/o, sit on the couch, lean forward from the trunk, and dishwash without producing or increasing symptoms in the (L) lower back , buttock, posterior thigh upto the knee.   4. Patient to consistently have good posture to promote her symptomfree condition.       Plan:   Continue with 4 more physical therapy sessions to assess and progress directional preference exercise, posture correction, postural strengthening/stability exercises, patient education, and HEP progression.

## 2024-01-26 NOTE — PROGRESS NOTES
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  She has been doing her BERT but not that regularly and she did a yoga class for few days.  Since she been feeling back pain she has been laying down prone more.  She feels better in the back from that position and she has worked her way to REIL.  She is better now but still sore and achy in the (L) lower back.    Objective:  ROM: (Pre-test symptom 3-4/10 across lower back (L>R), no (L) LE symptoms)  Lumbar        Movement Loss Pain (+/-)   Flexion Minimal loss Inc, NW and tight (L) leg   Extension Nil loss Dec, NB   R Sideglide Minimal loss NE tight   L Sideglide Nil loss NE      Date: 11/8/2023  Tx#: 2/8 Date: 11/15/2023  Tx#: 3/8 Date: 11/17/2023  Tx#: 4/8 Date: 11/22/2023  Tx#: 5/8 Date: 12/1/2023  Tx#: 6/8 Date: 12/20/223  Tx#: 7/8 Date: 1/26/2024  Tx#: 8/8   Scifit UE only L5 fwd/bkwd x 5 mins ea; NE     Pronelying x 1 min; NE (relaxed back)     REIL x 8 reps; NE     + self OP (sag) x 2 reps; NE    REIL with belt OP at pelvis x 10 reps; NE    Sustained lumbar extension with belt OP at pelvis x 3 + 3 mins (higher angle); NE pressure mid back    REIL with sheet OP x 10 reps; NE    BERT over rail x 10 reps; NE    BERT hands on wall x 10 reps; NE    Sitting posture correction with lumbar roll Scifit UE only L4 fwd/bkwd x 5 mins ea; NE     Sitting posture correction with lumbar roll    C/s retraction 2 x 10 reps; P, NW (better) (L) neck     + self OP x 10 reps; P, NW (better) (L) shoulder    - increased c/s (L) rotation and SB to almost nil loss with minimal to no ache and extension to minimal loss and no  ache    BERT hands on wall x 10 reps; Dec, A lower back ache    Seated: t/s extension x 10 reps; P, NW mid lower back    BERT over rail x 10 reps; NE (feels good)     - no ache lower back     Seated: c/s retraction with self OP x 10 reps; NE (minimal cueing to correct technique) TM: Retro walk @ 5% grade x 1.2 mph x 10 mins; NE postural training    BERT 2 x 10 reps; P, NW mid back stretch ache     - less ache lumbar (R) SG     REIL sag x 10 (8+2) reps; NE    REIL with sheet OP x 10 reps; NE stretch    - minimal to no ache (L) lower back during (R) SG test    Seated: c/s retraction with self OP 2 x 10 reps; NE     + c/s extension x 10 reps; P, NW (better) (L) neck       + lean back on chair x 10 reps; P, NW mid neck    BERT hands on wall x 10 reps; NE    Sitting posture correction using a lumbar roll TM: Retro walk @ 5% grade x 1.2 mph x 10 mins; NE postural training    BERT over rail 2 x 10 reps; NE      - no change (R) SG symptom    Pronelying x 1 min; NE unloaded    REIL with belt OP at pelvis 3 x 10 reps; P, NW mid back     - less ache (L) lower back during (R) SG    Prone: c/s sustained extension sphinx position on knuckles x 1 min; NE     + c/s repeated extension to fingertips x 10 reps; P, NW mid neck stretch/ache    Prone: Alt LE lift x 10 reps; NE    Prone: (B) UE extension 10 reps x 10 cts ea; NE     Prone: (B) UE h.abduction 10 reps x 10 cts ea; NE    Prone: (B) UE scaption 10 reps x 10 cts ea; NE    REIL with belt OP at pelvis x 10 reps; P, NW mid back   TM: Retro walk @ 5% grade x 1.2 mph x 10 mins; NE postural training    BERT over rail 2 x 10 reps; NE     RFISitting knees @ 90 and 45 degs only x 10 reps; P, NW (L) posterior thigh tightness/ache    BERT x 10 reps; NE    Prone: Alt LE lift with 2 lbs 2 x 10 reps; NE     Prone: (B) UE rhtymic-stab extension 1 lb 10 sets x 10 bounces ea; NE tired    Prone: (B) UE rhytmic-stab h.abduction 10 sets x 10 bounce ea; NE tired    Prone: c/s sustained extension  sphinx position on knuckles x 1 min; NE     + c/s repeated extension to fingertips x 10 reps; P, NW mid neck stretch/ache    BERT over rail x 10 reps; NE        TM: Retro walk @ 5% grade x 1.2 mph x 10 mins; NE postural training    BERT over rail 2 x 10 reps; NE     - None to very little ache at endrange of (R) SG lumbar ROM    Pronelying x 1 min; NE unload back    REIL hands at level of head to extend thoracic spine with sag on last 2 reps x 10 reps; NE    Prone: (B) UE rhtymic-stab extension 1 lb 10 sets x 10 bounces ea; NE tired    Prone: (B) UE rhytmic-stab h.abduction 5 sets x 10 bounce ea; NE tired    Prone: (B) UE rhytmic-stab scaption 5 sets x 10 bounces ea; NE tired    Prone: plank on toes and forearms 5 reps x 5 secs ea; NE tired    Hydrant (R/L) LE with 1 kg ball 5 reps x 10 cts ea; NE tired    BERT over rail x 10 reps; NE Scifit UE only L5 fwd/bkwd x 5 mins; Dec, B postural training with lumbar roll     BERT x 10 reps; NE    BERT over rail x 10 reps; NE    Pronelying x 1 min; P, NW (better)    REIL x 10 reps; NE    REIL sag x 10 (8+2) reps; P, NW (L) lower back     - same lumbar flexion range and symptom    Sustained lumbar extension in pronelying x 2 mins; NE     - increased flexion with less ache     Sustained lumbar extension with mat/table and belt OP at pelvis x 3+3 mins; P, NW (better)     - same increased lumbar flexion with same endrange ache    Seated: thoracic extension lean back on chair x 10 reps; P, NW (L) lower back (better)     - almost nil loss lumbar flexion with minimal ache endrange     + 10 reps of t/s extension with sag on last 2 reps; P, NW mid lower back     - nil loss lumbar flexion with almost no ache     Assessment/HEP:   Rosita has attended 8 physical therapy sessions from 11/1/2023 to 1/26/2024.  She was responding to lower lumbar extension directional preference exercise up until her session today. She had WNL of lumbar AROM without producing symptoms.  In the past few of  weeks she started doing yoga exercises and have been trying to incorporate her directional preference exercise (BERT or REIL) to her workout routine but has not been always consistent.  She started feeling pain/ache in the (L) lower back again. She was re-assessed today and she is presenting with an upper lumbar spine derangement with a directional preference toward repeated thoracic extension.  She was issued an added HEP of seated thoracic extension with lean back on chair for overpressure 10 reps 3x/day and to to continue with BERT or REIL 10 reps 3x/day.  She will benefit to continue with added physical therapy sessions to re-assess her symptoms and to progress her directional preference exercises to attain all goals set during initial evaluation.     Goals:   (8 visits)  1. Patient to consistently perform her HEP and it's progression to maintain her improved condition.   2. Patient to have reduced, centralized, and abolished symptoms in the low back to enable easier ADLs, functional, work, and recreational activities.   3. Patient to have WNL of lumbar mobility in all directions to be able to bend down, rise up from sitting, lay down on her back, wake up in the morning, do yardwork (weeding), carry her 1 y/o, sit on the couch, lean forward from the trunk, and dishwash without producing or increasing symptoms in the (L) lower back , buttock, posterior thigh upto the knee.   4. Patient to consistently have good posture to promote her symptomfree condition.       Plan:   Continue with 4 more physical therapy sessions to assess and progress directional preference exercise, posture correction, postural strengthening/stability exercises, patient education, and HEP progression.     Charges: TherEx x 3       Total Timed Treatment: 47 mins Total Treatment Time:  48mins

## 2024-01-31 ENCOUNTER — OFFICE VISIT (OUTPATIENT)
Dept: PHYSICAL THERAPY | Age: 39
End: 2024-01-31
Attending: PHYSICAL MEDICINE & REHABILITATION
Payer: COMMERCIAL

## 2024-01-31 PROCEDURE — 97110 THERAPEUTIC EXERCISES: CPT | Performed by: PHYSICAL THERAPIST

## 2024-01-31 NOTE — PROGRESS NOTES
Date: 1/31/2024         Dx: Other intervertebral disc displacement, lumbar region (M51.26)  Radiculopathy, lumbosacral region (M54.17)  Abnormal posture (R29.3)  Myalgia, other site (M79.18)           Authorized # of Visits:  8 + 4 (PN signed 1/29/24) = 12    Referring MD:  BABAR Barger   Next MD visit: none scheduled    Medication Changes since last visit?: No    Subjective: Rosita report feels better now in the back.  She only feels a tightness in the lower back but still hurts when she move in certain directions.  She was able to find a chair at home to do her HEP (t/s extension) regularly.    Objective:  ROM: (Pre-test symptom 0/10 across lower back (L>R), no (L) LE symptoms)  Lumbar        Movement Loss Pain (+/-)   Flexion Nil loss NE  (L) leg tightness   Extension Nil loss NE   R Sideglide Nil loss NE tight   L Sideglide Nil loss P, NW mid back      Date: 11/8/2023  Tx#: 2/8 Date: 11/15/2023  Tx#: 3/8 Date: 11/17/2023  Tx#: 4/8 Date: 11/22/2023  Tx#: 5/8 Date: 12/1/2023  Tx#: 6/8 Date: 12/20/223  Tx#: 7/8 Date: 1/26/2024  Tx#: 8/8   Scifit UE only L5 fwd/bkwd x 5 mins ea; NE     Pronelying x 1 min; NE (relaxed back)     REIL x 8 reps; NE     + self OP (sag) x 2 reps; NE    REIL with belt OP at pelvis x 10 reps; NE    Sustained lumbar extension with belt OP at pelvis x 3 + 3 mins (higher angle); NE pressure mid back    REIL with sheet OP x 10 reps; NE    BERT over rail x 10 reps; NE    BERT hands on wall x 10 reps; NE    Sitting posture correction with lumbar roll Scifit UE only L4 fwd/bkwd x 5 mins ea; NE     Sitting posture correction with lumbar roll    C/s retraction 2 x 10 reps; P, NW (better) (L) neck     + self OP x 10 reps; P, NW (better) (L) shoulder    - increased c/s (L) rotation and SB to almost nil loss with minimal to no ache and extension to minimal loss and no ache    BERT hands on wall x 10 reps; Dec, A lower back ache    Seated: t/s extension x 10 reps; P, NW mid lower back    BERT over rail x 10  reps; NE (feels good)     - no ache lower back     Seated: c/s retraction with self OP x 10 reps; NE (minimal cueing to correct technique) TM: Retro walk @ 5% grade x 1.2 mph x 10 mins; NE postural training    BERT 2 x 10 reps; P, NW mid back stretch ache     - less ache lumbar (R) SG     REIL sag x 10 (8+2) reps; NE    REIL with sheet OP x 10 reps; NE stretch    - minimal to no ache (L) lower back during (R) SG test    Seated: c/s retraction with self OP 2 x 10 reps; NE     + c/s extension x 10 reps; P, NW (better) (L) neck       + lean back on chair x 10 reps; P, NW mid neck    BERT hands on wall x 10 reps; NE    Sitting posture correction using a lumbar roll TM: Retro walk @ 5% grade x 1.2 mph x 10 mins; NE postural training    BERT over rail 2 x 10 reps; NE      - no change (R) SG symptom    Pronelying x 1 min; NE unloaded    REIL with belt OP at pelvis 3 x 10 reps; P, NW mid back     - less ache (L) lower back during (R) SG    Prone: c/s sustained extension sphinx position on knuckles x 1 min; NE     + c/s repeated extension to fingertips x 10 reps; P, NW mid neck stretch/ache    Prone: Alt LE lift x 10 reps; NE    Prone: (B) UE extension 10 reps x 10 cts ea; NE     Prone: (B) UE h.abduction 10 reps x 10 cts ea; NE    Prone: (B) UE scaption 10 reps x 10 cts ea; NE    REIL with belt OP at pelvis x 10 reps; P, NW mid back   TM: Retro walk @ 5% grade x 1.2 mph x 10 mins; NE postural training    BERT over rail 2 x 10 reps; NE     RFISitting knees @ 90 and 45 degs only x 10 reps; P, NW (L) posterior thigh tightness/ache    BERT x 10 reps; NE    Prone: Alt LE lift with 2 lbs 2 x 10 reps; NE     Prone: (B) UE rhtymic-stab extension 1 lb 10 sets x 10 bounces ea; NE tired    Prone: (B) UE rhytmic-stab h.abduction 10 sets x 10 bounce ea; NE tired    Prone: c/s sustained extension sphinx position on knuckles x 1 min; NE     + c/s repeated extension to fingertips x 10 reps; P, NW mid neck stretch/ache    BERT over rail x  10 reps; NE        TM: Retro walk @ 5% grade x 1.2 mph x 10 mins; NE postural training    BERT over rail 2 x 10 reps; NE     - None to very little ache at endrange of (R) SG lumbar ROM    Pronelying x 1 min; NE unload back    REIL hands at level of head to extend thoracic spine with sag on last 2 reps x 10 reps; NE    Prone: (B) UE rhtymic-stab extension 1 lb 10 sets x 10 bounces ea; NE tired    Prone: (B) UE rhytmic-stab h.abduction 5 sets x 10 bounce ea; NE tired    Prone: (B) UE rhytmic-stab scaption 5 sets x 10 bounces ea; NE tired    Prone: plank on toes and forearms 5 reps x 5 secs ea; NE tired    Hydrant (R/L) LE with 1 kg ball 5 reps x 10 cts ea; NE tired    BERT over rail x 10 reps; NE Scifit UE only L5 fwd/bkwd x 5 mins; Dec, B postural training with lumbar roll     BERT x 10 reps; NE    BERT over rail x 10 reps; NE    Pronelying x 1 min; P, NW (better)    REIL x 10 reps; NE    REIL sag x 10 (8+2) reps; P, NW (L) lower back     - same lumbar flexion range and symptom    Sustained lumbar extension in pronelying x 2 mins; NE     - increased flexion with less ache     Sustained lumbar extension with mat/table and belt OP at pelvis x 3+3 mins; P, NW (better)     - same increased lumbar flexion with same endrange ache    Seated: thoracic extension lean back on chair x 10 reps; P, NW (L) lower back (better)     - almost nil loss lumbar flexion with minimal ache endrange     + 10 reps of t/s extension with sag on last 2 reps; P, NW mid lower back     - nil loss lumbar flexion with almost no ache     Date: 1/31/2024  Tx#: 9/12      Scifit UE only L6 fwd/bkwd x 5 mins ea; NE postural training with lumbar roll    BERT hands on waist (push forward on waist) x 10 reps; NE    BERT over rail x 10 reps; NE     - less endrange ache (L) SG    Seated: t/s extension lean back on chair x 10 reps; NE     - no endrange ache (L) SG and less tightness (L) thigh    Pronelying x 1 min; NE    REIL hands at level of head to extend  thoracic spine x 5 reps + sag 2 reps; NE     + sheet OP at thoracic spine x 10 reps; NE    Prone: (B) UE rhytmic-stab extension 1 lb 5 sets x 10 bounces ea; NE tired    Prone: (B) UE rhytmic-stab h.abduction 5 sets x 10 bounces ea: NE tired    Prone: (B) UE scaption 5 reps x 10 cts ea; NE     Prone: back extension 10 reps x 10 cts ea; NE    (B) UE n.row and extension greenTB 10 rep x 10 cts ea; NE tired    Seated: t/s extension lean back on chair x 10 reps; NE        Assessment/HEP:   Rosita continue to respond to thoracic extension directional preference exercise.  She was taught this exercise in sitting leaning back on a chair or in pronelying with sheet OP at mid back area.  She has WNL of lumbar AROM in all directions without symptoms after this directional preference exercise.  She was progressed with postural strengthening/stability exercises in pronelying and standing positions with minimal cueing to correct her form, posture, and technique.  She was able to understand her priority exercise of thoracic extension either in sitting or pronelying 10 reps 3x/day.  Reinforced posture correction in sitting using a lumbar roll.  All questions and concerns were answered and addressed at this time.     Goals:   (8 visits)  1. Patient to consistently perform her HEP and it's progression to maintain her improved condition.   2. Patient to have reduced, centralized, and abolished symptoms in the low back to enable easier ADLs, functional, work, and recreational activities.   3. Patient to have WNL of lumbar mobility in all directions to be able to bend down, rise up from sitting, lay down on her back, wake up in the morning, do yardwork (weeding), carry her 1 y/o, sit on the couch, lean forward from the trunk, and dishwash without producing or increasing symptoms in the (L) lower back , buttock, posterior thigh upto the knee.   4. Patient to consistently have good posture to promote her symptomfree condition.       Plan:    Re-assess next continue to progress directional preference exercise, posture correction, postural strengthening/stability exercises, patient education, and HEP progression.     Charges: TherEx x 3       Total Timed Treatment: 48 mins Total Treatment Time:  49 mins

## 2024-02-09 ENCOUNTER — OFFICE VISIT (OUTPATIENT)
Dept: PHYSICAL THERAPY | Age: 39
End: 2024-02-09
Attending: PHYSICAL MEDICINE & REHABILITATION
Payer: COMMERCIAL

## 2024-02-09 PROCEDURE — 97110 THERAPEUTIC EXERCISES: CPT | Performed by: PHYSICAL THERAPIST

## 2024-02-09 NOTE — PROGRESS NOTES
Date: 1/31/2024         Dx: Other intervertebral disc displacement, lumbar region (M51.26)  Radiculopathy, lumbosacral region (M54.17)  Abnormal posture (R29.3)  Myalgia, other site (M79.18)           Authorized # of Visits:  8 + 4 (PN signed 1/29/24) = 12    Referring MD:  BABAR Barger   Next MD visit: none scheduled    Medication Changes since last visit?: No    Subjective: Rosita report feels better now in the back.  She only feels a tightness in the lower back but still hurts when she move in certain directions.  She was able to find a chair at home to do her HEP (t/s extension) regularly.    Objective:  ROM: (Pre-test symptom 0/10 across lower back (L>R), no (L) LE symptoms)  Lumbar        Movement Loss Pain (+/-)   Flexion Nil loss NE  (L) leg tightness   Extension Nil loss NE   R Sideglide Nil loss NE cramp opposite buttock   L Sideglide Nil loss NE cramp opposite buttock     Date: 11/8/2023  Tx#: 2/8 Date: 11/15/2023  Tx#: 3/8 Date: 11/17/2023  Tx#: 4/8 Date: 11/22/2023  Tx#: 5/8 Date: 12/1/2023  Tx#: 6/8 Date: 12/20/223  Tx#: 7/8 Date: 1/26/2024  Tx#: 8/8   Scifit UE only L5 fwd/bkwd x 5 mins ea; NE     Pronelying x 1 min; NE (relaxed back)     REIL x 8 reps; NE     + self OP (sag) x 2 reps; NE    REIL with belt OP at pelvis x 10 reps; NE    Sustained lumbar extension with belt OP at pelvis x 3 + 3 mins (higher angle); NE pressure mid back    REIL with sheet OP x 10 reps; NE    BERT over rail x 10 reps; NE    BERT hands on wall x 10 reps; NE    Sitting posture correction with lumbar roll Scifit UE only L4 fwd/bkwd x 5 mins ea; NE     Sitting posture correction with lumbar roll    C/s retraction 2 x 10 reps; P, NW (better) (L) neck     + self OP x 10 reps; P, NW (better) (L) shoulder    - increased c/s (L) rotation and SB to almost nil loss with minimal to no ache and extension to minimal loss and no ache    BERT hands on wall x 10 reps; Dec, A lower back ache    Seated: t/s extension x 10 reps; P, NW mid lower  back    BERT over rail x 10 reps; NE (feels good)     - no ache lower back     Seated: c/s retraction with self OP x 10 reps; NE (minimal cueing to correct technique) TM: Retro walk @ 5% grade x 1.2 mph x 10 mins; NE postural training    BERT 2 x 10 reps; P, NW mid back stretch ache     - less ache lumbar (R) SG     REIL sag x 10 (8+2) reps; NE    REIL with sheet OP x 10 reps; NE stretch    - minimal to no ache (L) lower back during (R) SG test    Seated: c/s retraction with self OP 2 x 10 reps; NE     + c/s extension x 10 reps; P, NW (better) (L) neck       + lean back on chair x 10 reps; P, NW mid neck    BERT hands on wall x 10 reps; NE    Sitting posture correction using a lumbar roll TM: Retro walk @ 5% grade x 1.2 mph x 10 mins; NE postural training    BERT over rail 2 x 10 reps; NE      - no change (R) SG symptom    Pronelying x 1 min; NE unloaded    REIL with belt OP at pelvis 3 x 10 reps; P, NW mid back     - less ache (L) lower back during (R) SG    Prone: c/s sustained extension sphinx position on knuckles x 1 min; NE     + c/s repeated extension to fingertips x 10 reps; P, NW mid neck stretch/ache    Prone: Alt LE lift x 10 reps; NE    Prone: (B) UE extension 10 reps x 10 cts ea; NE     Prone: (B) UE h.abduction 10 reps x 10 cts ea; NE    Prone: (B) UE scaption 10 reps x 10 cts ea; NE    REIL with belt OP at pelvis x 10 reps; P, NW mid back   TM: Retro walk @ 5% grade x 1.2 mph x 10 mins; NE postural training    BERT over rail 2 x 10 reps; NE     RFISitting knees @ 90 and 45 degs only x 10 reps; P, NW (L) posterior thigh tightness/ache    BERT x 10 reps; NE    Prone: Alt LE lift with 2 lbs 2 x 10 reps; NE     Prone: (B) UE rhtymic-stab extension 1 lb 10 sets x 10 bounces ea; NE tired    Prone: (B) UE rhytmic-stab h.abduction 10 sets x 10 bounce ea; NE tired    Prone: c/s sustained extension sphinx position on knuckles x 1 min; NE     + c/s repeated extension to fingertips x 10 reps; P, NW mid neck  stretch/ache    BERT over rail x 10 reps; NE        TM: Retro walk @ 5% grade x 1.2 mph x 10 mins; NE postural training    BERT over rail 2 x 10 reps; NE     - None to very little ache at endrange of (R) SG lumbar ROM    Pronelying x 1 min; NE unload back    REIL hands at level of head to extend thoracic spine with sag on last 2 reps x 10 reps; NE    Prone: (B) UE rhtymic-stab extension 1 lb 10 sets x 10 bounces ea; NE tired    Prone: (B) UE rhytmic-stab h.abduction 5 sets x 10 bounce ea; NE tired    Prone: (B) UE rhytmic-stab scaption 5 sets x 10 bounces ea; NE tired    Prone: plank on toes and forearms 5 reps x 5 secs ea; NE tired    Hydrant (R/L) LE with 1 kg ball 5 reps x 10 cts ea; NE tired    BERT over rail x 10 reps; NE Scifit UE only L5 fwd/bkwd x 5 mins; Dec, B postural training with lumbar roll     BERT x 10 reps; NE    BERT over rail x 10 reps; NE    Pronelying x 1 min; P, NW (better)    REIL x 10 reps; NE    REIL sag x 10 (8+2) reps; P, NW (L) lower back     - same lumbar flexion range and symptom    Sustained lumbar extension in pronelying x 2 mins; NE     - increased flexion with less ache     Sustained lumbar extension with mat/table and belt OP at pelvis x 3+3 mins; P, NW (better)     - same increased lumbar flexion with same endrange ache    Seated: thoracic extension lean back on chair x 10 reps; P, NW (L) lower back (better)     - almost nil loss lumbar flexion with minimal ache endrange     + 10 reps of t/s extension with sag on last 2 reps; P, NW mid lower back     - nil loss lumbar flexion with almost no ache     Date: 1/31/2024  Tx#: 9/12 Date: 2/9/2024  Tx#: 10/12     Scifit UE only L6 fwd/bkwd x 5 mins ea; NE postural training with lumbar roll    BERT hands on waist (push forward on waist) x 10 reps; NE    BERT over rail x 10 reps; NE     - less endrange ache (L) SG    Seated: t/s extension lean back on chair x 10 reps; NE     - no endrange ache (L) SG and less tightness (L)  thigh    Pronelying x 1 min; NE    REIL hands at level of head to extend thoracic spine x 5 reps + sag 2 reps; NE     + sheet OP at thoracic spine x 10 reps; NE    Prone: (B) UE rhytmic-stab extension 1 lb 5 sets x 10 bounces ea; NE tired    Prone: (B) UE rhytmic-stab h.abduction 5 sets x 10 bounces ea: NE tired    Prone: (B) UE scaption 5 reps x 10 cts ea; NE     Prone: back extension 10 reps x 10 cts ea; NE    (B) UE n.row and extension greenTB 10 rep x 10 cts ea; NE tired    Seated: t/s extension lean back on chair x 10 reps; NE TM: Retro walk @ 5% grade x 1.2 mph x 10 mins; NE postural training    BERT over rail 2 x 10 reps; NE    RFISitting knees @ 90 degs x 10 reps; NE    RFISitting knees @ 45 degs x 10 reps; P, NW (L) posterior thigh pull/tight/ache    RFISitting knees @ 0 deg x 10 reps; P, NW (submax) (L) posterior thigh pull/tight/ache    BERT over rail x 10 reps; NE    (B) UE rhytmic-stab extension blueTB 10 sets x 10 bounces ea; NE tired    UE wall overhead wall walk with redTB abduction resist 10 reps x 10 cts ea; NE tired    Monster walk fwd/bkwd with blueTB abd resist 3 laps x 30 feet; NE tired    BERT over rail x 10 reps; NE               Assessment/HEP:   Rosita has WNL of lumbar AROM with endrange tightness in the posterior (L) thigh toward lumbar flexion.  Initiated return to lumbar flexion program in sitting with (L) posterior thigh ache/tight/pull during  RFISitting with knees @ 45 and 0 deg but no worse as a result.  She was issued and instructed on doing the return to flexion in sitting and if she produce more pain/ache she she is to return to lumbar extension exercise only.  She understood and agreed with the treatment plan.  All questions and concerns were answered and addressed at this time.     Goals:   (8 visits)  1. Patient to consistently perform her HEP and it's progression to maintain her improved condition.   2. Patient to have reduced, centralized, and abolished symptoms in the low  back to enable easier ADLs, functional, work, and recreational activities.   3. Patient to have WNL of lumbar mobility in all directions to be able to bend down, rise up from sitting, lay down on her back, wake up in the morning, do yardwork (weeding), carry her 3 y/o, sit on the couch, lean forward from the trunk, and dishwash without producing or increasing symptoms in the (L) lower back , buttock, posterior thigh upto the knee.   4. Patient to consistently have good posture to promote her symptomfree condition.     Plan:   Re-assess next continue to progress directional preference exercise, posture correction, postural strengthening/stability exercises, patient education, and HEP progression.     Charges: TherEx x 3       Total Timed Treatment: 44 mins Total Treatment Time:  45 mins

## 2024-02-16 ENCOUNTER — APPOINTMENT (OUTPATIENT)
Dept: PHYSICAL THERAPY | Age: 39
End: 2024-02-16
Attending: PHYSICAL MEDICINE & REHABILITATION
Payer: COMMERCIAL

## 2024-02-21 ENCOUNTER — TELEMEDICINE (OUTPATIENT)
Dept: FAMILY MEDICINE CLINIC | Facility: CLINIC | Age: 39
End: 2024-02-21
Payer: COMMERCIAL

## 2024-02-21 ENCOUNTER — NURSE TRIAGE (OUTPATIENT)
Dept: FAMILY MEDICINE CLINIC | Facility: CLINIC | Age: 39
End: 2024-02-21

## 2024-02-21 DIAGNOSIS — U07.1 COVID: Primary | ICD-10-CM

## 2024-02-21 PROCEDURE — 99213 OFFICE O/P EST LOW 20 MIN: CPT | Performed by: NURSE PRACTITIONER

## 2024-02-21 NOTE — TELEPHONE ENCOUNTER
Patient tested positive for COVID yesterday- pt says she started \"feeling weird on Tuesday\" and self treated herself for a yeast infection with monistat. Was having cough at night time.     Pt was feeling \"off\" last week.     Pt started sneezing yesterday. Patient's  is also positive for COVID- pt says  \"feels awful\" but she feels ok \"     Patient says her joints  \" hurt a little bit \" .       Patient denies shortness of breath, or chest pain at this time.     Patient has several questions in regards to isolation- and being around her  and kids.      Pt scheduled to discuss questions/concerns with SUSANA Scott today.

## 2024-02-21 NOTE — PROGRESS NOTES
Due to the real risk of possible exposure to Coronavirus (CoV-2, COVID-19) in the office/medical building and recommendations for social distancing (key to mitigation/limiting the spread of the virus) a Virtual or Telemedicine visit over the phone was performed as below.     Patient has consented to the Virtual/Telephone Check-In service and expresses understanding and accepts financial responsibility for any deductible, co-insurance and/or co-pays associated with this service.    [facility]health outside of [facility]  Telehealth Verbal Consent   I conducted a telehealth visit with Rosita [place] today, 02/21/24, which was completed using two-way, real-time interactive audio and video communication. This has been done in good marizol to provide continuity of care in the best interest of the provider-patient relationship, due to the COVID -19 public health crisis/national emergency where restrictions of face-to-face office visits are ongoing. Every conscious effort was taken to allow for sufficient and adequate time to complete the visit.  The patient was made aware of the limitations of the telehealth visit, including treatment limitations as no physical exam could be performed.  The patient was advised to call 911 or to go to the ER in case there was an emergency.  The patient was also advised of the potential privacy & security concerns related to the telehealth platform.   The patient was made aware of where to find [facility]'s notice of privacy practices, telehealth consent form and other related consent forms and documents.  which are located on the [facility] website. The patient verbally agreed to telehealth consent form, related consents and the risks discussed.    Lastly, the patient confirmed that they were in [place].   Included in this visit, time may have been spent reviewing labs, medications, radiology tests and decision making. Appropriate medical decision-making and tests are ordered as detailed in the plan of care above.   Coding/billing information is submitted for this visit based on complexity of care and/or time spent for the visit.    HPI:  Chief Complaint   Patient presents with    Follow - Up     COVID+       Rosita Shirley is a 38 year old female who calls for complaints of:    COVID+ yesterday. She started not feeling well last Tuesday with a mild headache, slight sniffle, mild cough. She also had some neck and back soreness, but does have chronic back pain. Had some low-grade temps off/on in the 99F range. She tested negative for COVID on Friday. Her  felt sick yesterday and tested positive for COVID, so she retested herself and was positive. Feels OK now.     ROS:  Negative except as above.    Physical Exam:  GEN:  Patient is alert, awake and oriented, well developed, well nourished.  LUNGS: Patient speaks clearly in full sentences without dyspnea, no cough while on video visit.  PSYCH: Appropriate mood and affect.    Allergies   Allergen Reactions    Adhesive Tape      Other reaction(s): ADHESIVE BANDAGE    Latex      Other reaction(s): Rash    Ritalin [Methylphenidate] Coughing     Current Outpatient Medications   Medication Sig Dispense Refill    naproxen 500 MG Oral Tab Take 1 tablet (500 mg total) by mouth 2 (two) times daily with meals. 28 tablet 0    busPIRone 5 MG Oral Tab Take 1 tablet (5 mg total) by mouth 2 (two) times daily.      prednisoLONE 1 % Ophthalmic Suspension Instill 1 drop into right eye four times a day      erythromycin 5 MG/GM Ophthalmic Ointment       triamcinolone 0.1 % External Cream Apply 1 Application topically 2 (two) times daily. To itchy irritated areas, areas of eczema 80 g 2    clindamycin 1 % External Lotion Apply 1 Application topically 2 (two) times daily. Apply thin film to affected area(s) for acne/ pimples 60 mL 5    Ferrous Sulfate 325 (65 Fe) MG Oral Tab Take 1 tablet (325 mg total) by mouth daily with breakfast.      benzonatate 100 MG Oral Cap Take 1 capsule (100 mg total) by  mouth 3 (three) times daily as needed for cough. 30 capsule 0    omeprazole 20 MG Oral Capsule Delayed Release Take 1 capsule (20 mg total) by mouth every morning before breakfast.      BLACK ELDERBERRY OR Take by mouth.      Dexmethylphenidate HCl ER 30 MG Oral Capsule SR 24 Hr Take 1 capsule (30 mg total) by mouth daily.      busPIRone 10 MG Oral Tab Take 1 tablet (10 mg total) by mouth 2 (two) times daily.      Ciclopirox 8 % External Solution Apply to affected toenails once daily 1 each 3    Cetirizine HCl (ZYRTEC ALLERGY) 10 MG Oral Cap       Nutritional Supplements (VITAMIN D BOOSTER OR) As Directed.       Past Medical History:   Diagnosis Date    Acid reflux     Breast disorder     Hypoglycemia     Migraines     Acid Reflux    Post partum depression     Postpartum anxiety (HCC)     Visual impairment      History reviewed. No pertinent surgical history.      ASSESSMENT AND PLAN:   1. Patient is a 38 year old female who calls for: COVID+    Additional Assessment and Plan:  1. COVID  -[facility] quarantine guidelines reviewed with patient.  -Recommended patient isolate from family members and all parties should wear a mask when patient is outside of room.   -Good hand hygiene for all house members, do not share food/drinks. Recommended disinfecting shared surfaces.  -Signs/symptoms to report discussed. To go to ER with any chest pain, severe/persistent shortness of breath, cyanosis, lethargy, confusion, syncope or near-syncope, or inability to tolerate PO fluids.   -Recommended symptomatic treatment including PO fluids, humidifier in bedroom, rest, OTC cough/cold medications that match symptoms.    Follow up with me 1 week PRN    Call and/or go to the ER if worsening symptoms including, but not limited to: respiratory distress, shortness of breath and  wheezing, worsening fever, cough and mental status.      The patient (or patient's parent if <17 y/o) indicates understanding of the above recommendations and agrees to  the above plan.    No orders of the defined types were placed in this encounter.      Meds & Refills for this Visit:  Requested Prescriptions      No prescriptions requested or ordered in this encounter       Imaging & Consults:  None    SUSANA Seo     Time spent during encounter: 15 minutes

## 2024-02-23 ENCOUNTER — APPOINTMENT (OUTPATIENT)
Dept: PHYSICAL THERAPY | Age: 39
End: 2024-02-23
Attending: PHYSICAL MEDICINE & REHABILITATION
Payer: COMMERCIAL

## 2024-03-11 ENCOUNTER — OFFICE VISIT (OUTPATIENT)
Dept: FAMILY MEDICINE CLINIC | Facility: CLINIC | Age: 39
End: 2024-03-11
Payer: COMMERCIAL

## 2024-03-11 VITALS
OXYGEN SATURATION: 97 % | HEART RATE: 87 BPM | SYSTOLIC BLOOD PRESSURE: 115 MMHG | BODY MASS INDEX: 30.73 KG/M2 | TEMPERATURE: 98 F | HEIGHT: 64 IN | WEIGHT: 180 LBS | DIASTOLIC BLOOD PRESSURE: 78 MMHG

## 2024-03-11 DIAGNOSIS — M54.50 CHRONIC MIDLINE LOW BACK PAIN WITHOUT SCIATICA: ICD-10-CM

## 2024-03-11 DIAGNOSIS — E78.00 HYPERCHOLESTEROLEMIA: ICD-10-CM

## 2024-03-11 DIAGNOSIS — F41.9 ANXIETY: ICD-10-CM

## 2024-03-11 DIAGNOSIS — Z00.00 ADULT GENERAL MEDICAL EXAMINATION: Primary | ICD-10-CM

## 2024-03-11 DIAGNOSIS — Z86.39 HISTORY OF IRON DEFICIENCY: ICD-10-CM

## 2024-03-11 DIAGNOSIS — G89.29 CHRONIC MIDLINE LOW BACK PAIN WITHOUT SCIATICA: ICD-10-CM

## 2024-03-11 DIAGNOSIS — R74.8 ELEVATED ALKALINE PHOSPHATASE LEVEL: ICD-10-CM

## 2024-03-11 DIAGNOSIS — K21.9 GASTROESOPHAGEAL REFLUX DISEASE WITHOUT ESOPHAGITIS: ICD-10-CM

## 2024-03-11 DIAGNOSIS — G47.09 OTHER INSOMNIA: ICD-10-CM

## 2024-03-11 PROBLEM — E61.1 IRON DEFICIENCY: Status: RESOLVED | Noted: 2023-05-08 | Resolved: 2024-03-11

## 2024-03-11 PROBLEM — M54.16 LUMBAR RADICULOPATHY, ACUTE: Status: RESOLVED | Noted: 2019-02-20 | Resolved: 2024-03-11

## 2024-03-11 NOTE — PROGRESS NOTES
Chief Complaint:   Chief Complaint   Patient presents with    Physical       HPI:   This is a 38 year old female coming in for physical. Feels OK overall. Had COVID in February but no lingering symptoms. Hx anxiety, followed by psychiatry. Just stopped her buspar and is doing well. Takes Focalin for fatigue and this has helped. Back pain is stable to improved, seeing PT and orthopedics. Hx GERD, stable. Hx iron deficiency, can stop iron supplement now based on recent labs as this upsets her stomach. Has some trouble staying asleep at night x months.    Results for orders placed or performed in visit on 01/18/24   Alk Phosphatase, Bone Specific   Result Value Ref Range    Alkaline Phosphatase, Bone-Specific 20.3 ug/L   Comp Metabolic Panel (14)   Result Value Ref Range    Glucose 84 70 - 99 mg/dL    Sodium 138 136 - 145 mmol/L    Potassium 3.8 3.5 - 5.1 mmol/L    Chloride 103 98 - 112 mmol/L    CO2 30.0 21.0 - 32.0 mmol/L    Anion Gap 5 0 - 18 mmol/L    BUN 9 9 - 23 mg/dL    Creatinine 0.76 0.55 - 1.02 mg/dL    BUN/CREA Ratio 11.8 10.0 - 20.0    Calcium, Total 9.7 8.7 - 10.4 mg/dL    Calculated Osmolality 284 275 - 295 mOsm/kg    eGFR-Cr 102 >=60 mL/min/1.73m2    ALT 20 10 - 49 U/L    AST 20 <=34 U/L    Alkaline Phosphatase 116 (H) 37 - 98 U/L    Bilirubin, Total 0.5 0.3 - 1.2 mg/dL    Total Protein 7.6 5.7 - 8.2 g/dL    Albumin 4.4 3.2 - 4.8 g/dL    Globulin  3.2 2.8 - 4.4 g/dL    A/G Ratio 1.4 1.0 - 2.0    Patient Fasting for CMP? No    Iron And Tibc   Result Value Ref Range    Iron 141 50 - 170 ug/dL    Transferrin 230 (L) 250 - 380 mg/dL    Total Iron Binding Capacity 343 250 - 425 ug/dL    % Saturation 41 15 - 50 %   Ferritin   Result Value Ref Range    Ferritin 55.3 12.0 - 240.0 ng/mL             Past Medical History:   Diagnosis Date    Acid reflux     Breast disorder     Hypoglycemia     Iron deficiency     Migraines     Acid Reflux    Post partum depression     Postpartum anxiety (HCC)     Visual impairment       History reviewed. No pertinent surgical history.  Social History:  Social History     Socioeconomic History    Marital status:    Tobacco Use    Smoking status: Never    Smokeless tobacco: Never   Vaping Use    Vaping Use: Never used   Substance and Sexual Activity    Alcohol use: Yes     Comment: rarely    Drug use: No   Other Topics Concern    Caffeine Concern Yes     Comment: soda, tea - 2-3 cups per day    Exercise No    Grew up [place] a farm No    History of tanning Yes    Outdoor occupation No    Breast feeding No    Reaction to local anesthetic No    Pt has a pacemaker No    Pt has a defibrillator No   Social History Narrative    The patient does not use an assistive device..      The patient does live in a home with stairs.        3 children (7 yo, 3yo and 17 mo - 6/2021)    Work - FT, insurance     Family History:  Family History   Problem Relation Age of Onset    Uterine Cancer Mother     Lipids Mother         hyperlipidemia    Hypertension Mother     Heart Disease Mother     Lipids Father         hyperlipidema    Hypertension Father     Allergies Sister     Stroke Paternal Grandmother         CVA (stroke)    Other (Other) Paternal Grandmother         NO DX IN NG, JUST 'OTHER'    Alcohol and Other Disorders Associated Paternal Grandfather         alcoholism    Colon Cancer Paternal Grandfather     Cancer Paternal Grandfather         lung--patient stated only colon ca    Breast Cancer Paternal Aunt 55    Asthma Other         cousin     Allergies:  Allergies   Allergen Reactions    Adhesive Tape      Other reaction(s): ADHESIVE BANDAGE    Latex      Other reaction(s): Rash    Ritalin [Methylphenidate] Coughing     Current Meds:  Current Outpatient Medications   Medication Sig Dispense Refill    naproxen 500 MG Oral Tab Take 1 tablet (500 mg total) by mouth 2 (two) times daily with meals. 28 tablet 0    omeprazole 20 MG Oral Capsule Delayed Release Take 1 capsule (20 mg total) by mouth every  morning before breakfast.      BLACK ELDERBERRY OR Take by mouth.      Dexmethylphenidate HCl ER 30 MG Oral Capsule SR 24 Hr Take 1 capsule (30 mg total) by mouth daily.      Cetirizine HCl (ZYRTEC ALLERGY) 10 MG Oral Cap       Nutritional Supplements (VITAMIN D BOOSTER OR) As Directed.        Counseling given: Not Answered       REVIEW OF SYSTEMS:   CONSTITUTIONAL:  Denies unusual weight gain/loss, fever, chills, or fatigue.  HEENT:  Eyes:  Denies eye pain, visual loss, blurred vision. Denies hearing loss, sneezing, congestion, runny nose or sore throat.  INTEGUMENTARY:  Denies rashes, itching, skin lesion.  CARDIOVASCULAR:  Denies chest pain, palpitations, edema, dyspnea on exertion or at rest.  RESPIRATORY:  Denies shortness of breath, wheezing, cough or sputum.  GASTROINTESTINAL:  Denies abdominal pain, nausea, vomiting, constipation, diarrhea, or blood in stool.  GENITOURINARY: Denies dysuria, hematuria, frequency.  MUSCULOSKELETAL:  Chronic low back pain, see HPI.  NEUROLOGICAL:  Denies headache, seizures, dizziness.  PSYCHIATRIC:  Anxiety as per HPI. Denies suicidal thoughts.    EXAM:   /78 (BP Location: Left arm, Patient Position: Sitting, Cuff Size: adult)   Pulse 87   Temp 97.7 °F (36.5 °C) (Temporal)   Ht 5' 4\" (1.626 m)   Wt 180 lb (81.6 kg)   LMP 02/13/2024 (Exact Date)   SpO2 97%   BMI 30.90 kg/m²  Estimated body mass index is 30.9 kg/m² as calculated from the following:    Height as of this encounter: 5' 4\" (1.626 m).    Weight as of this encounter: 180 lb (81.6 kg).   Vital signs reviewed.Appears stated age, well groomed, in no acute distress.  Physical Exam:  GEN:  Patient is alert, awake and oriented, well developed, well nourished.  HEENT:  Head:  Normocephalic, atraumatic Eyes: EOMI, PERRLA, conjunctivae clear bilaterally, no eye discharge Ears: External normal, TM clear bilaterally. Nose: patent, no nasal discharge. Throat:  No tonsillar erythema or exudate.  Mouth:  No oral  lesions, good dentition.  NECK: Supple, no CLAD, no thyromegaly.  SKIN: No rashes, no skin lesion, no bruising, good turgor.  HEART:  Regular rate and rhythm, no murmurs, rubs or gallops.  LUNGS: Clear to auscultation bilterally, no rales/rhonchi/wheezing.  ABDOMEN:  Soft, nondistended, nontender, bowel sounds normal in all 4 quadrants, no masses, no hepatosplenomegaly.  BACK: No tenderness to palpation, FROM.  EXTREMITIES:  No edema, no cyanosis, no clubbing, FROM, 2+ dorsalis pedis pulses bilaterally.  NEURO:  No deficit, normal gait, strength and tone, sensory intact, normal reflexes.    ASSESSMENT AND PLAN:   1. Adult general medical examination  -Healthy diet and regular exercise.  -Annual eye exam and biannual dental visits.  -Labs as below in June 2024.  - CBC With Differential With Platelet; Future  - Comp Metabolic Panel (14); Future  - Hemoglobin A1C; Future  - Lipid Panel; Future  - Assay, Thyroid Stim Hormone; Future    2. Anxiety  -Seeing psychiatry. Off medication now.    3. Hypercholesterolemia  -Will monitor lipids on next labs. Not on medication.  - Lipid Panel; Future    4. Chronic midline low back pain without sciatica  -Seeing PT/ortho.    5. Other insomnia  -Sleep hygiene measures discussed in detail. Follow-up PRN if this persists.     6. Elevated alkaline phosphatase level  -Will monitor, has been stable.  - Comp Metabolic Panel (14); Future    7. Gastroesophageal reflux disease without esophagitis  -Stable on omeprazole.    8. History of iron deficiency  -Stop iron now, repeat iron studies 3 months.  - Ferritin; Future  - Iron And Tibc; Future      Meds & Refills for this Visit:  Requested Prescriptions      No prescriptions requested or ordered in this encounter         Problem List:  Patient Active Problem List   Diagnosis    Low back pain    Migraines    Sacroiliac joint dysfunction of left side    Hamstring tightness of both lower extremities    Trigger point with back pain    Pelvic  floor dysfunction    Anxiety    GERD (gastroesophageal reflux disease)    Hypercholesterolemia    Obesity    Lumbosacral disc herniation       Sonia Fontenot, APRN  3/11/2024  12:01 PM

## 2024-05-16 ENCOUNTER — HOSPITAL ENCOUNTER (OUTPATIENT)
Age: 39
Discharge: HOME | End: 2024-05-16

## 2024-05-16 VITALS
SYSTOLIC BLOOD PRESSURE: 137 MMHG | RESPIRATION RATE: 20 BRPM | HEART RATE: 70 BPM | TEMPERATURE: 98 F | DIASTOLIC BLOOD PRESSURE: 92 MMHG | OXYGEN SATURATION: 99 %

## 2024-05-16 DIAGNOSIS — Z11.52 ENCOUNTER FOR SCREENING LABORATORY TESTING FOR COVID-19 VIRUS: Primary | ICD-10-CM

## 2024-05-16 DIAGNOSIS — J06.9 VIRAL URI WITH COUGH: ICD-10-CM

## 2024-05-16 LAB
S PYO AG THROAT QL: NEGATIVE
SARS-COV-2 RNA RESP QL NAA+PROBE: NOT DETECTED

## 2024-05-16 PROCEDURE — 99213 OFFICE O/P EST LOW 20 MIN: CPT | Performed by: NURSE PRACTITIONER

## 2024-05-16 PROCEDURE — 87880 STREP A ASSAY W/OPTIC: CPT | Performed by: NURSE PRACTITIONER

## 2024-05-16 PROCEDURE — U0002 COVID-19 LAB TEST NON-CDC: HCPCS | Performed by: NURSE PRACTITIONER

## 2024-05-16 NOTE — DISCHARGE INSTRUCTIONS
COVID and strep are negative.  Symptoms appear viral.  Continue home remedies for symptom control.  Symptoms should resolve on their own.  Follow-up with your primary doctor if no improvement

## 2024-05-16 NOTE — ED PROVIDER NOTES
Patient Seen in: [facility]      History     Chief Complaint   Patient presents with    Sore Throat     Stated Complaint: Sore throat    Subjective:   38-year-old female with no past medical history presents from home.  Patient is here with complaint of sore throat and cough for about 1 week.  States her daughter throat pain increased yesterday.  She is having pain with swallowing.  No fever.  She did have a negative COVID test on day 1 of symptoms.  She had COVID in March.  She has been taking NyQuil and ibuprofen at home.  States her  was recently seen here and diagnosed with ear infection patient denies ear pain.    The history is provided by the patient. No  was used.       Objective:   Past Medical History:    Acid reflux    Breast disorder    Hypoglycemia    Iron deficiency    Migraines    Acid Reflux    Post partum depression    Postpartum anxiety (HCC)    Visual impairment              History reviewed. No pertinent surgical history.             Social History     Socioeconomic History    Marital status:    Tobacco Use    Smoking status: Never    Smokeless tobacco: Never   Vaping Use    Vaping status: Never Used   Substance and Sexual Activity    Alcohol use: Yes     Comment: rarely    Drug use: No   Other Topics Concern    Caffeine Concern Yes     Comment: soda, tea - 2-3 cups per day    Exercise No    Grew up on a farm No    History of tanning Yes    Outdoor occupation No    Breast feeding No    Reaction to local anesthetic No    Pt has a pacemaker No    Pt has a defibrillator No   Social History Narrative    The patient does not use an assistive device..      The patient does live in a home with stairs.        3 children (7 yo, 5yo and 17 mo - 6/2021)    Work - FT, insurance     Social Determinants of Health      Received from [facility], [facility]    Social Connections    Received from [facility]  [place], [facility]    Housing Stability              Review of Systems    Positive for stated complaint: Sore throat  Other systems are as noted in HPI.  Constitutional and vital signs reviewed.      All other systems reviewed and negative except as noted above.    Physical Exam     ED Triage Vitals [05/16/24 0817]   BP (!) 137/92   Pulse 70   Resp 20   Temp 97.7 °F (36.5 °C)   Temp src Oral   SpO2 99 %   O2 Device None (Room air)       Current Vitals:   Vital Signs  BP: (!) 137/92  Pulse: 70  Resp: 20  Temp: 97.7 °F (36.5 °C)  Temp src: Oral    Oxygen Therapy  SpO2: 99 %  O2 Device: None (Room air)            Physical Exam  Vitals and nursing note reviewed.   Constitutional:       General: She is not in acute distress.     Appearance: Normal appearance. She is not ill-appearing or toxic-appearing.   HENT:      Head: Normocephalic and atraumatic.      Right Ear: Tympanic membrane, ear canal and external ear normal.      Left Ear: Tympanic membrane, ear canal and external ear normal.      Nose: Nose normal.      Mouth/Throat:      Mouth: Mucous membranes are moist.      Pharynx: Oropharynx is clear. Posterior oropharyngeal erythema present. No pharyngeal swelling.      Tonsils: No tonsillar exudate.   Eyes:      Pupils: Pupils are equal, round, and reactive to light.   Cardiovascular:      Rate and Rhythm: Normal rate and regular rhythm.      Pulses: Normal pulses.   Pulmonary:      Effort: Pulmonary effort is normal. No respiratory distress.      Breath sounds: Normal breath sounds.      Comments: Lungs clear.  No adventitious lung sounds.  No distress.  No hypoxia.  Pulse ox 99% ra. Which is normal    Abdominal:      General: Abdomen is flat.      Palpations: Abdomen is soft.   Musculoskeletal:         General: No signs of injury. Normal range of motion.      Cervical back: Normal range of motion and neck supple.   Skin:     General: Skin is warm and dry.      Capillary Refill: Capillary refill  takes less than 2 seconds.   Neurological:      General: No focal deficit present.      Mental Status: She is alert and oriented to person, place, and time.      GCS: GCS eye subscore is 4. GCS verbal subscore is 5. GCS motor subscore is 6.   Psychiatric:         Mood and Affect: Mood normal.         Behavior: Behavior normal.         Thought Content: Thought content normal.         Judgment: Judgment normal.           ED Course     Labs Reviewed   POCT RAPID STREP - Normal   RAPID SARS-COV-2 BY PCR - Normal     Recent Results (from the past 24 hour(s))   Rapid SARS-CoV-2 by PCR    Collection Time: 05/16/24  8:23 AM    Specimen: Nares; Other   Result Value Ref Range    Rapid SARS-CoV-2 by PCR Not Detected Not Detected   POCT Rapid Strep    Collection Time: 05/16/24  8:32 AM   Result Value Ref Range    POCT Rapid Strep Negative Negative       MDM        Medical Decision Making  Differential diagnosis:  strep, covid, viral illness  Rapid strep negative. Benign oropharynx exam. No PTA  Covid negative  Lungs clear. No distress. Pulse ox 99% ra which is normal. No suspicion for pneumonia. No indication for chest xray  Patient well appearing on exam. Symptoms appear viral  Recommend otc treatment: continue ibuprofen, delsym, cough drops, oral fluids  Results and plan of care discussed with the patient/family. They are in agreement with discharge. They understand to follow up with their primary doctor or the referral physician for further evaluation, especially if no improvement.  Also discussed the limitations of immediate care, patient is aware that if symptoms are worse they should go to the emergency room. Verbal and written discharge instructions were given.       Problems Addressed:  Encounter for screening laboratory testing for COVID-19 virus: acute illness or injury  Viral URI with cough: acute illness or injury    Amount and/or Complexity of Data Reviewed  Labs: ordered. Decision-making details documented in ED  Course.    Risk  OTC drugs.        Disposition and Plan     Clinical Impression:  1. Encounter for screening laboratory testing for COVID-19 virus    2. Viral URI with cough         Disposition:  Discharge  5/16/2024  8:44 am    Follow-up:  Marky Lucas MD  [address] [place] [id number]  [phone]                Medications Prescribed:  Discharge Medication List as of 5/16/2024  8:55 AM

## 2024-05-16 NOTE — ED INITIAL ASSESSMENT (HPI)
Patient is here with general aches and pains, a worsening cough or the last five days and a sore throat.  She denies fever.

## 2024-05-22 ENCOUNTER — OFFICE VISIT (OUTPATIENT)
Dept: DERMATOLOGY CLINIC | Facility: CLINIC | Age: 39
End: 2024-05-22

## 2024-05-22 DIAGNOSIS — Z12.83 SCREENING EXAM FOR SKIN CANCER: ICD-10-CM

## 2024-05-22 DIAGNOSIS — L82.1 SEBORRHEIC KERATOSIS: Primary | ICD-10-CM

## 2024-05-22 DIAGNOSIS — L81.4 LENTIGO: ICD-10-CM

## 2024-05-22 DIAGNOSIS — L70.0 ACNE VULGARIS: ICD-10-CM

## 2024-05-22 DIAGNOSIS — D23.9 BENIGN NEOPLASM OF SKIN, UNSPECIFIED LOCATION: ICD-10-CM

## 2024-05-22 DIAGNOSIS — D22.9 MULTIPLE NEVI: ICD-10-CM

## 2024-05-22 PROCEDURE — 99213 OFFICE O/P EST LOW 20 MIN: CPT | Performed by: DERMATOLOGY

## 2024-05-22 RX ORDER — TRETINOIN 0.5 MG/G
CREAM TOPICAL
Qty: 20 G | Refills: 3 | Status: SHIPPED | OUTPATIENT
Start: 2024-05-22

## 2024-05-22 RX ORDER — CLINDAMYCIN PHOSPHATE 10 MG/G
1 GEL TOPICAL 2 TIMES DAILY
Qty: 60 G | Refills: 12 | Status: SHIPPED | OUTPATIENT
Start: 2024-05-22

## 2024-06-02 NOTE — PROGRESS NOTES
Rosita Shirley is a 38 year old female.  HPI:     CC:    Chief Complaint   Patient presents with    Lesion     No hx of skin ca.  LOV 11/2023.  Pt presents for lesion of concern to the mid chest, face, and left hand for over 1 year.  Denies bleeding or pain.  Vaseline and aquaphor to the chest area         Allergies:  Adhesive tape, Latex, and Ritalin [methylphenidate]    HISTORY:    Past Medical History:    Acid reflux    Breast disorder    Hypoglycemia    Iron deficiency    Migraines    Acid Reflux    Post partum depression    Postpartum anxiety (HCC)    Visual impairment      History reviewed. No pertinent surgical history.   Family History   Problem Relation Age of Onset    Uterine Cancer Mother     Lipids Mother         hyperlipidemia    Hypertension Mother     Heart Disease Mother     Lipids Father         hyperlipidema    Hypertension Father     Allergies Sister     Stroke Paternal Grandmother         CVA (stroke)    Other (Other) Paternal Grandmother         NO DX IN NG, JUST 'OTHER'    Alcohol and Other Disorders Associated Paternal Grandfather         alcoholism    Colon Cancer Paternal Grandfather     Cancer Paternal Grandfather         lung--patient stated only colon ca    Breast Cancer Paternal Aunt 55    Asthma Other         cousin      Social History     Socioeconomic History    Marital status:    Tobacco Use    Smoking status: Never    Smokeless tobacco: Never   Vaping Use    Vaping status: Never Used   Substance and Sexual Activity    Alcohol use: Yes     Comment: rarely    Drug use: No   Other Topics Concern    Caffeine Concern Yes     Comment: soda, tea - 2-3 cups per day    Exercise No    Grew up on a farm No    History of tanning Yes    Outdoor occupation No    Breast feeding No    Reaction to local anesthetic No    Pt has a pacemaker No    Pt has a defibrillator No   Social History Narrative    The patient does not use an assistive device..      The patient does live in a home with stairs.         3 children (5 yo, 3yo and 17 mo - 6/2021)    Work - FT, insurance     Social Determinants of Health      [facility], [facility]    Social Connections    Received from [facility], [facility]    Housing Stability        Current Outpatient Medications   Medication Sig Dispense Refill    Clindamycin Phosphate 1 % External Gel Apply 1 Application topically 2 (two) times daily. Use bid as directed to acne on chest 60 g 12    Tretinoin 0.05 % External Cream Apply a small amount to areas of acne on forehead/along hairline as directed at bedtime. 20 g 3    omeprazole 20 MG Oral Capsule Delayed Release Take 1 capsule (20 mg total) by mouth every morning before breakfast.      BLACK ELDERBERRY OR Take by mouth.      Dexmethylphenidate HCl ER 30 MG Oral Capsule SR 24 Hr Take 1 capsule (30 mg total) by mouth daily.      Cetirizine HCl (ZYRTEC ALLERGY) 10 MG Oral Cap       Nutritional Supplements (VITAMIN D BOOSTER OR) As Directed.      naproxen 500 MG Oral Tab Take 1 tablet (500 mg total) by mouth 2 (two) times daily with meals. (Patient not taking: Reported on 5/16/2024) 28 tablet 0     Allergies:   Allergies   Allergen Reactions    Adhesive Tape      Other reaction(s): ADHESIVE BANDAGE    Latex      Other reaction(s): Rash    Ritalin [Methylphenidate] Coughing       Past Medical History:    Acid reflux    Breast disorder    Hypoglycemia    Iron deficiency    Migraines    Acid Reflux    Post partum depression    Postpartum anxiety (HCC)    Visual impairment     History reviewed. No pertinent surgical history.  Social History     Socioeconomic History    Marital status:      Spouse name: Not on file    Number of children: Not on file    Years of education: Not on file    Highest education level: Not on file   Occupational History    Not on file   Tobacco Use    Smoking status: Never    Smokeless tobacco: Never    Vaping Use    Vaping status: Never Used   Substance and Sexual Activity    Alcohol use: Yes     Comment: rarely    Drug use: No    Sexual activity: Not on file   Other Topics Concern     Service Not Asked    Blood Transfusions Not Asked    Caffeine Concern Yes     Comment: soda, tea - 2-3 cups per day    Occupational Exposure Not Asked    Hobby Hazards Not Asked    Sleep Concern Not Asked    Stress Concern Not Asked    Weight Concern Not Asked    Special Diet Not Asked    Back Care Not Asked    Exercise No    Bike Helmet Not Asked    Seat Belt Not Asked    Self-Exams Not Asked    Grew up on a farm No    History of tanning Yes    Outdoor occupation No    Breast feeding No    Reaction to local anesthetic No    Pt has a pacemaker No    Pt has a defibrillator No   Social History Narrative    The patient does not use an assistive device..      The patient does live in a home with stairs.        3 children (5 yo, 5yo and 17 mo - 6/2021)    Work - FT, insurance     Social Determinants of Health     Financial Resource Strain: Not on file   Food Insecurity: Not on file   Transportation Needs: Not on file   Physical Activity: Not on file   Stress: Not on file   Social Connections: Unknown (3/18/2021)    [facility]    Social Connections     Conversations with friends/family/neighbors per week: Not on file   Housing Stability: Low Risk  (7/6/2021)    [facility], [facility]    Housing Stability     Mortgage Payment Concerns?: Not on file     Number of Places Lived in the Last Year: Not on file     Unstable Housing?: Not on file     Family History   Problem Relation Age of Onset    Uterine Cancer Mother     Lipids Mother         hyperlipidemia    Hypertension Mother     Heart Disease Mother     Lipids Father         hyperlipidema    Hypertension Father     Allergies Sister     Stroke Paternal  Grandmother         CVA (stroke)    Other (Other) Paternal Grandmother         NO DX IN NG, JUST 'OTHER'    Alcohol and Other Disorders Associated Paternal Grandfather         alcoholism    Colon Cancer Paternal Grandfather     Cancer Paternal Grandfather         lung--patient stated only colon ca    Breast Cancer Paternal Aunt 55    Asthma Other         cousin       There were no vitals filed for this visit.    HPI:  Chief Complaint   Patient presents with    Lesion     No hx of skin ca.  LOV 11/2023.  Pt presents for lesion of concern to the mid chest, face, and left hand for over 1 year.  Denies bleeding or pain.  Vaseline and aquaphor to the chest area       Follow-up     Patient presents with concerns above.    Patient has been in their usual state of health.     Past notes/ records and appropriate/relevant lab results including pathology and past body maps reviewed. Including outside notes/ PCP notes as appropriate. Updated and new information noted in current visit.     ROS:  Denies other relevant systemic complaints.      History, medications, allergies reviewed as noted.       Physical Examination:     Well-developed well-nourished patient alert oriented in no acute distress.  Exam performed, including scalp, head, neck, face,nails, hair, external eyes, including conjunctival mucosa, eyelids, lips external ears , arms, digits,palms.     Multiple light to medium brown, well marginated, uniformly pigmented, macules and papules 6 mm and less scattered on exam. pigmented lesions examined with dermoscopy benign-appearing patterns.     Waxy tannish keratotic papules scattered, cherry-red vascular papules scattered.    See map today's date for lesions noted .  See assessment and plan below for specific lesions.    Otherwise remarkable for lesions as noted on map.    See A/P  below for additional information:    Assessment / plan:    No orders of the defined types were placed in this encounter.      Meds & Refills  for this Visit:  Requested Prescriptions     Signed Prescriptions Disp Refills    Clindamycin Phosphate 1 % External Gel 60 g 12     Sig: Apply 1 Application topically 2 (two) times daily. Use bid as directed to acne on chest    Tretinoin 0.05 % External Cream 20 g 3     Sig: Apply a small amount to areas of acne on forehead/along hairline as directed at bedtime.         Encounter Diagnoses   Name Primary?    Seborrheic keratosis Yes    Multiple nevi     Lentigo     Benign neoplasm of skin, unspecified location     Screening exam for skin cancer     Acne vulgaris        Acne. See medications in grid.  Skin care regimen discussed at length including cleansers, makeup, face washing, sunscreen.  Recheck in 6 -8 weeks if no improvement.  Notify us promptly if problems tolerating regimen.  Consider more aggressive therapy if not responding.  Clindamycin lotion may use on chest  Cystic lesion right medial chest 0.1 mL 10 mg/mL Kenalog injected to scar hypertrophic at central chest.  Continue topical medications, monitoring.  Painful lesion noticed this 1 medication February March  Continue tretinoin  Contact dermatitis, topical steroids as needed.  Dermatitis.  Meds in grid.  Skin care instructions reviewed.  Dawson use of emollients.  Pathophysiology reviewed.  Consider Contac allergy in differential.  Consider patch testing.  Patient will let us know how they are doing over the next several weeks.  Await clinical response to above therapies.   History of reactions to shampoo versus cosmetic products.  Consider Eucrisa patient currently using shampoo Jere You which works fairly well.   patient with numerous allergies sensitive skin.  Topical steroids if needed  Scattered eczematous patches over the shoulders monitor    Small skin tags discussed possible removal patient given monitoring presently  Reassurance given    Benign nevi keratoses lentigines reassurance    White spots on arms consistent with benign  keratoses, D SAP/lichenoid like keratoses  Alphahydroxy acids including lactic acid, glycolic acid, urea, salicylic acid may be helpful.  Suggest over-the-counter products such as Goldbond psoriasis cream 3% salicylic acid, rough and bumpy, Cerave SA cream, AmLactin, and others as available with these ingredients.  Retinol's as well may be helpful    No other susupicious lesions on todays  exam.    Numerous benign-appearing nevi generalized reassurance regarding intradermal nevi of the face, keratoses over the arms  Benign nevi over the feet right fourth fifth toe,  Monitor benign-appearing on dermoscopy    Extensive lentigines, benign keratoses over the arms.    No other susupicious lesions on todays  exam.    Please refer to map for specific lesions.  See additional diagnoses.  Pros cons of various therapies, risks benefits discussed.Pathophysiology discussed with patient.  Therapeutic options reviewed.  See  Medications in grid.  Instructions reviewed at length.    Benign nevi, seborrheic  keratoses, cherry angiomas:  Reassurance regarding other benign skin lesions.Signs and symptoms of skin cancer, ABCDE's of melanoma discussed with patient. Sunscreen use, sun protection, self exams reviewed.  Followup as noted RTC ---routine checkup    6 mos -one year or p.r.n.    Encounter Times   Including precharting, reviewing chart, prior notes obtaining history: 10 minutes, medical exam :10 minutes, notes on body map, plan, counseling 10minutes My total time spent caring for the patient on the day of the encounter: 30 minutes     The patient indicates understanding of these issues and agrees to the plan.  The patient is asked to return as noted in follow-up/ above.    This note was generated using Dragon voice recognition software.  Please contact me regarding any confusion resulting from errors in recognition..  Note to patient and family: The 21st Century Cures Act makes medical notes like these available to patients.  However, be advised this is a medical document. It is intended as [id number] communication and monitoring of a patient's care needs. It is written in medical language and may contain abbreviations or verbiage that are unfamiliar. It may appear blunt or direct. Medical documents are intended to carry relevant information, facts as evident and the clinical opinion of the practitioner.

## 2024-06-24 ENCOUNTER — TELEPHONE (OUTPATIENT)
Dept: OBGYN | Age: 39
End: 2024-06-24

## 2024-07-13 ENCOUNTER — APPOINTMENT (OUTPATIENT)
Dept: OBGYN | Age: 39
End: 2024-07-13

## 2024-07-13 VITALS
HEART RATE: 75 BPM | BODY MASS INDEX: 28.47 KG/M2 | WEIGHT: 176.37 LBS | SYSTOLIC BLOOD PRESSURE: 133 MMHG | DIASTOLIC BLOOD PRESSURE: 91 MMHG | OXYGEN SATURATION: 98 %

## 2024-07-13 DIAGNOSIS — N92.1 MENOMETRORRHAGIA: ICD-10-CM

## 2024-07-13 DIAGNOSIS — N89.8 VAGINAL IRRITATION: Primary | ICD-10-CM

## 2024-07-15 ENCOUNTER — E-ADVICE (OUTPATIENT)
Dept: OBGYN | Age: 39
End: 2024-07-15

## 2024-07-15 LAB
BACTERIAL VAGINOSIS VAG-IMP: NOT DETECTED
C ALBICANS DNA VAG QL NAA+PROBE: POSITIVE
C GLABRATA DNA VAG QL NAA+PROBE: NOT DETECTED
SERVICE CMNT-IMP: ABNORMAL
SERVICE CMNT-IMP: NORMAL
T VAGINALIS DNA VAG QL NAA+PROBE: NOT DETECTED

## 2024-07-16 DIAGNOSIS — B37.31 VAGINAL YEAST INFECTION: Primary | ICD-10-CM

## 2024-07-16 RX ORDER — FLUCONAZOLE 150 MG/1
150 TABLET ORAL
Qty: 2 TABLET | Refills: 0 | Status: SHIPPED | OUTPATIENT
Start: 2024-07-16 | End: 2024-07-17 | Stop reason: SDUPTHER

## 2024-07-17 ENCOUNTER — TELEPHONE (OUTPATIENT)
Dept: OBGYN | Age: 39
End: 2024-07-17

## 2024-07-17 DIAGNOSIS — B37.31 VAGINAL YEAST INFECTION: ICD-10-CM

## 2024-07-17 RX ORDER — FLUCONAZOLE 150 MG/1
150 TABLET ORAL
Qty: 2 TABLET | Refills: 0 | Status: SHIPPED | OUTPATIENT
Start: 2024-07-17

## 2024-07-23 ENCOUNTER — APPOINTMENT (OUTPATIENT)
Dept: OBGYN | Age: 39
End: 2024-07-23

## 2024-07-23 VITALS
HEIGHT: 65 IN | TEMPERATURE: 98.5 F | SYSTOLIC BLOOD PRESSURE: 130 MMHG | WEIGHT: 181.33 LBS | BODY MASS INDEX: 30.21 KG/M2 | HEART RATE: 78 BPM | DIASTOLIC BLOOD PRESSURE: 83 MMHG | OXYGEN SATURATION: 96 %

## 2024-07-23 DIAGNOSIS — Z01.419 ENCOUNTER FOR WELL WOMAN EXAM WITH ROUTINE GYNECOLOGICAL EXAM: Primary | ICD-10-CM

## 2024-07-23 DIAGNOSIS — Z12.31 SCREENING MAMMOGRAM FOR BREAST CANCER: ICD-10-CM

## 2024-07-23 PROCEDURE — 99396 PREV VISIT EST AGE 40-64: CPT | Performed by: OBSTETRICS & GYNECOLOGY

## 2024-08-20 ENCOUNTER — TELEPHONE (OUTPATIENT)
Dept: OBGYN | Age: 39
End: 2024-08-20

## 2024-08-22 ENCOUNTER — APPOINTMENT (OUTPATIENT)
Dept: ULTRASOUND IMAGING | Age: 39
End: 2024-08-22
Attending: OBSTETRICS & GYNECOLOGY

## 2024-08-22 ENCOUNTER — APPOINTMENT (OUTPATIENT)
Dept: OBGYN | Age: 39
End: 2024-08-22

## 2024-10-05 SDOH — ECONOMIC STABILITY: GENERAL: WOULD YOU LIKE HELP WITH ANY OF THE FOLLOWING NEEDS?: I DON'T WANT HELP WITH ANY OF THESE

## 2024-10-05 SDOH — ECONOMIC STABILITY: HOUSING INSECURITY: DO YOU HAVE PROBLEMS WITH ANY OF THE FOLLOWING?: NONE OF THE ABOVE

## 2024-10-05 SDOH — ECONOMIC STABILITY: FOOD INSECURITY: WITHIN THE PAST 12 MONTHS, THE FOOD YOU BOUGHT JUST DIDN'T LAST AND YOU DIDN'T HAVE MONEY TO GET MORE.: NEVER TRUE

## 2024-10-05 SDOH — ECONOMIC STABILITY: TRANSPORTATION INSECURITY
IN THE PAST 12 MONTHS, HAS LACK OF RELIABLE TRANSPORTATION KEPT YOU FROM MEDICAL APPOINTMENTS, MEETINGS, WORK OR FROM GETTING THINGS NEEDED FOR DAILY LIVING?: NO

## 2024-10-05 SDOH — ECONOMIC STABILITY: HOUSING INSECURITY: WHAT IS YOUR LIVING SITUATION TODAY?: I HAVE A STEADY PLACE TO LIVE

## 2024-10-05 ASSESSMENT — SOCIAL DETERMINANTS OF HEALTH (SDOH): IN THE PAST 12 MONTHS, HAS THE ELECTRIC, GAS, OIL, OR WATER COMPANY THREATENED TO SHUT OFF SERVICE IN YOUR HOME?: NO

## 2024-10-06 ENCOUNTER — LAB ENCOUNTER (OUTPATIENT)
Dept: LAB | Facility: HOSPITAL | Age: 39
End: 2024-10-06
Attending: NURSE PRACTITIONER
Payer: COMMERCIAL

## 2024-10-06 DIAGNOSIS — Z00.00 ADULT GENERAL MEDICAL EXAMINATION: ICD-10-CM

## 2024-10-06 DIAGNOSIS — R74.8 ELEVATED ALKALINE PHOSPHATASE LEVEL: ICD-10-CM

## 2024-10-06 DIAGNOSIS — E78.00 HYPERCHOLESTEROLEMIA: ICD-10-CM

## 2024-10-06 DIAGNOSIS — Z86.39 HISTORY OF IRON DEFICIENCY: ICD-10-CM

## 2024-10-06 LAB
ALBUMIN SERPL-MCNC: 4.7 G/DL (ref 3.2–4.8)
ALBUMIN/GLOB SERPL: 1.5 {RATIO} (ref 1–2)
ALP LIVER SERPL-CCNC: 118 U/L
ALT SERPL-CCNC: 33 U/L
ANION GAP SERPL CALC-SCNC: 6 MMOL/L (ref 0–18)
AST SERPL-CCNC: 31 U/L (ref ?–34)
BASOPHILS # BLD AUTO: 0.07 X10(3) UL (ref 0–0.2)
BASOPHILS NFR BLD AUTO: 0.9 %
BILIRUB SERPL-MCNC: 0.4 MG/DL (ref 0.3–1.2)
BUN BLD-MCNC: 12 MG/DL (ref 9–23)
BUN/CREAT SERPL: 14 (ref 10–20)
CALCIUM BLD-MCNC: 9.9 MG/DL (ref 8.7–10.4)
CHLORIDE SERPL-SCNC: 105 MMOL/L (ref 98–112)
CHOLEST SERPL-MCNC: 211 MG/DL (ref ?–200)
CO2 SERPL-SCNC: 29 MMOL/L (ref 21–32)
CREAT BLD-MCNC: 0.86 MG/DL
DEPRECATED HBV CORE AB SER IA-ACNC: 16 NG/ML
DEPRECATED RDW RBC AUTO: 39.1 FL (ref 35.1–46.3)
EGFRCR SERPLBLD CKD-EPI 2021: 88 ML/MIN/1.73M2 (ref 60–?)
EOSINOPHIL # BLD AUTO: 0.07 X10(3) UL (ref 0–0.7)
EOSINOPHIL NFR BLD AUTO: 0.9 %
ERYTHROCYTE [DISTWIDTH] IN BLOOD BY AUTOMATED COUNT: 12.2 % (ref 11–15)
EST. AVERAGE GLUCOSE BLD GHB EST-MCNC: 100 MG/DL (ref 68–126)
FASTING PATIENT LIPID ANSWER: NO
FASTING STATUS PATIENT QL REPORTED: NO
GLOBULIN PLAS-MCNC: 3.1 G/DL (ref 2–3.5)
GLUCOSE BLD-MCNC: 119 MG/DL (ref 70–99)
HBA1C MFR BLD: 5.1 % (ref ?–5.7)
HCT VFR BLD AUTO: 41 %
HDLC SERPL-MCNC: 49 MG/DL (ref 40–59)
HGB BLD-MCNC: 14.1 G/DL
IMM GRANULOCYTES # BLD AUTO: 0.02 X10(3) UL (ref 0–1)
IMM GRANULOCYTES NFR BLD: 0.3 %
IRON SATN MFR SERPL: 13 %
IRON SERPL-MCNC: 56 UG/DL
LDLC SERPL CALC-MCNC: 130 MG/DL (ref ?–100)
LYMPHOCYTES # BLD AUTO: 2.89 X10(3) UL (ref 1–4)
LYMPHOCYTES NFR BLD AUTO: 36.9 %
MCH RBC QN AUTO: 29.9 PG (ref 26–34)
MCHC RBC AUTO-ENTMCNC: 34.4 G/DL (ref 31–37)
MCV RBC AUTO: 87 FL
MONOCYTES # BLD AUTO: 0.55 X10(3) UL (ref 0.1–1)
MONOCYTES NFR BLD AUTO: 7 %
NEUTROPHILS # BLD AUTO: 4.24 X10 (3) UL (ref 1.5–7.7)
NEUTROPHILS # BLD AUTO: 4.24 X10(3) UL (ref 1.5–7.7)
NEUTROPHILS NFR BLD AUTO: 54 %
NONHDLC SERPL-MCNC: 162 MG/DL (ref ?–130)
OSMOLALITY SERPL CALC.SUM OF ELEC: 291 MOSM/KG (ref 275–295)
PLATELET # BLD AUTO: 373 10(3)UL (ref 150–450)
POTASSIUM SERPL-SCNC: 3.8 MMOL/L (ref 3.5–5.1)
PROT SERPL-MCNC: 7.8 G/DL (ref 5.7–8.2)
RBC # BLD AUTO: 4.71 X10(6)UL
SODIUM SERPL-SCNC: 140 MMOL/L (ref 136–145)
TIBC SERPL-MCNC: 440 UG/DL (ref 250–425)
TRANSFERRIN SERPL-MCNC: 295 MG/DL (ref 250–380)
TRIGL SERPL-MCNC: 180 MG/DL (ref 30–149)
TSI SER-ACNC: 2.32 MIU/ML (ref 0.55–4.78)
VLDLC SERPL CALC-MCNC: 33 MG/DL (ref 0–30)
WBC # BLD AUTO: 7.8 X10(3) UL (ref 4–11)

## 2024-10-06 PROCEDURE — 84466 ASSAY OF TRANSFERRIN: CPT

## 2024-10-06 PROCEDURE — 83540 ASSAY OF IRON: CPT

## 2024-10-06 PROCEDURE — 80061 LIPID PANEL: CPT

## 2024-10-06 PROCEDURE — 85025 COMPLETE CBC W/AUTO DIFF WBC: CPT

## 2024-10-06 PROCEDURE — 80053 COMPREHEN METABOLIC PANEL: CPT

## 2024-10-06 PROCEDURE — 36415 COLL VENOUS BLD VENIPUNCTURE: CPT

## 2024-10-06 PROCEDURE — 84443 ASSAY THYROID STIM HORMONE: CPT

## 2024-10-06 PROCEDURE — 83036 HEMOGLOBIN GLYCOSYLATED A1C: CPT

## 2024-10-06 PROCEDURE — 82728 ASSAY OF FERRITIN: CPT

## 2024-10-07 ENCOUNTER — APPOINTMENT (OUTPATIENT)
Dept: ULTRASOUND IMAGING | Age: 39
End: 2024-10-07
Attending: OBSTETRICS & GYNECOLOGY

## 2024-10-07 ENCOUNTER — APPOINTMENT (OUTPATIENT)
Dept: OBGYN | Age: 39
End: 2024-10-07

## 2024-10-07 DIAGNOSIS — N92.1 MENOMETRORRHAGIA: Primary | ICD-10-CM

## 2024-10-07 LAB
B-HCG UR QL: NEGATIVE
INTERNAL PROCEDURAL CONTROLS ACCEPTABLE: YES
TEST LOT EXPIRATION DATE: NORMAL
TEST LOT NUMBER: NORMAL

## 2024-10-07 PROCEDURE — 88305 TISSUE EXAM BY PATHOLOGIST: CPT | Performed by: INTERNAL MEDICINE

## 2024-10-07 ASSESSMENT — PATIENT HEALTH QUESTIONNAIRE - PHQ9
2. FEELING DOWN, DEPRESSED OR HOPELESS: NOT AT ALL
CLINICAL INTERPRETATION OF PHQ2 SCORE: NO FURTHER SCREENING NEEDED
1. LITTLE INTEREST OR PLEASURE IN DOING THINGS: NOT AT ALL
SUM OF ALL RESPONSES TO PHQ9 QUESTIONS 1 AND 2: 0
SUM OF ALL RESPONSES TO PHQ9 QUESTIONS 1 AND 2: 0

## 2024-10-09 LAB
ASR DISCLAIMER: NORMAL
CASE RPRT: NORMAL
CLINICAL INFO: NORMAL
PATH REPORT.FINAL DX SPEC: NORMAL
PATH REPORT.GROSS SPEC: NORMAL

## 2024-10-21 ENCOUNTER — APPOINTMENT (OUTPATIENT)
Dept: OBGYN | Age: 39
End: 2024-10-21

## 2024-10-21 DIAGNOSIS — N92.1 MENOMETRORRHAGIA: Primary | ICD-10-CM

## 2024-10-21 PROCEDURE — 99214 OFFICE O/P EST MOD 30 MIN: CPT | Performed by: OBSTETRICS & GYNECOLOGY

## 2024-10-21 RX ORDER — ACETAMINOPHEN AND CODEINE PHOSPHATE 120; 12 MG/5ML; MG/5ML
1 SOLUTION ORAL DAILY
Qty: 90 TABLET | Refills: 3 | Status: SHIPPED | OUTPATIENT
Start: 2024-10-21 | End: 2025-10-21

## 2024-11-16 ENCOUNTER — HOSPITAL ENCOUNTER (OUTPATIENT)
Dept: MAMMOGRAPHY | Facility: HOSPITAL | Age: 39
Discharge: HOME | End: 2024-11-16
Attending: OBSTETRICS & GYNECOLOGY
Payer: COMMERCIAL

## 2024-11-16 DIAGNOSIS — Z12.31 ENCOUNTER FOR SCREENING MAMMOGRAM FOR MALIGNANT NEOPLASM OF BREAST: ICD-10-CM

## 2024-11-16 PROCEDURE — 77067 SCR MAMMO BI INCL CAD: CPT | Performed by: OBSTETRICS & GYNECOLOGY

## 2024-11-16 PROCEDURE — 77063 BREAST TOMOSYNTHESIS BI: CPT | Performed by: OBSTETRICS & GYNECOLOGY

## 2024-11-18 ENCOUNTER — NURSE TRIAGE (OUTPATIENT)
Dept: FAMILY MEDICINE CLINIC | Facility: CLINIC | Age: 39
End: 2024-11-18

## 2024-11-18 ENCOUNTER — APPOINTMENT (OUTPATIENT)
Dept: GENERAL RADIOLOGY | Age: 39
End: 2024-11-18
Attending: NURSE PRACTITIONER
Payer: COMMERCIAL

## 2024-11-18 ENCOUNTER — HOSPITAL ENCOUNTER (OUTPATIENT)
Age: 39
Discharge: HOME | End: 2024-11-18
Payer: COMMERCIAL

## 2024-11-18 ENCOUNTER — TELEPHONE (OUTPATIENT)
Dept: FAMILY MEDICINE CLINIC | Facility: CLINIC | Age: 39
End: 2024-11-18

## 2024-11-18 VITALS
HEART RATE: 75 BPM | DIASTOLIC BLOOD PRESSURE: 88 MMHG | RESPIRATION RATE: 18 BRPM | OXYGEN SATURATION: 98 % | SYSTOLIC BLOOD PRESSURE: 142 MMHG | TEMPERATURE: 99 F

## 2024-11-18 DIAGNOSIS — R05.9 COUGH: Primary | ICD-10-CM

## 2024-11-18 DIAGNOSIS — J06.9 VIRAL UPPER RESPIRATORY TRACT INFECTION WITH COUGH: ICD-10-CM

## 2024-11-18 LAB
S PYO AG THROAT QL: NEGATIVE
SARS-COV-2 RNA RESP QL NAA+PROBE: NOT DETECTED

## 2024-11-18 PROCEDURE — 71046 X-RAY EXAM CHEST 2 VIEWS: CPT | Performed by: NURSE PRACTITIONER

## 2024-11-18 PROCEDURE — U0002 COVID-19 LAB TEST NON-CDC: HCPCS | Performed by: NURSE PRACTITIONER

## 2024-11-18 PROCEDURE — 87880 STREP A ASSAY W/OPTIC: CPT | Performed by: NURSE PRACTITIONER

## 2024-11-18 PROCEDURE — 99214 OFFICE O/P EST MOD 30 MIN: CPT | Performed by: NURSE PRACTITIONER

## 2024-11-18 RX ORDER — FLUTICASONE PROPIONATE 50 MCG
SPRAY, SUSPENSION (ML) NASAL DAILY
COMMUNITY

## 2024-11-18 RX ORDER — ACETAMINOPHEN AND CODEINE PHOSPHATE 120; 12 MG/5ML; MG/5ML
0.35 SOLUTION ORAL DAILY
COMMUNITY
Start: 2024-10-21 | End: 2025-10-21

## 2024-11-18 RX ORDER — BENZONATATE 200 MG/1
200 CAPSULE ORAL 3 TIMES DAILY PRN
Qty: 20 CAPSULE | Refills: 0 | Status: SHIPPED | OUTPATIENT
Start: 2024-11-18

## 2024-11-18 NOTE — TELEPHONE ENCOUNTER
RN spoke with patient.     Patient says she has had a cough for a week. Cough has not improved, maybe has gotten worse.     No congestion. Throat has been getting sore the last few days. Patient has not taken her temperature. Patient has not taken a covid test.    Patient states that it is sometimes hard to take a deep breath without having to cough.    No history of asthma. Patient says sometimes wheezing is present.     Patient advised to proceed to an immediate care for an assessment and evaluation of symptoms as no providers are in office today.    Patient is agreeable and voiced understanding.         Reason for Disposition   Wheezing is present    Protocols used: Cough-A-OH

## 2024-11-18 NOTE — DISCHARGE INSTRUCTIONS
Take the benzonatate as prescribed as needed for the cough.  Viral infections are usually the worst on days 3-5, but can last up to 14 days.  You may develop or continue to cough for up to 3 weeks after.  Viral infections do not improve with the use of antibiotics.  - [place] diet, increase water intake; gatorade or pedialyte   Runny Nose/Congestion:  - Humidifier at bedside   - Vicks vaporub under nose and chest wall  - Flonase nasal spray once daily in am and once daily in PM  - Antihistamine (Allegra, Zyrtec, Claritin) once daily  Cough:  - Mucinex DM OR Robitussin  - Warm tea w/honey  - Cool mist hmidifier in bedroom at night  Sore Throat:  - Salt water gargle  - Ibuprofen every 6-8 hours as needed for pain  - Throat lozenges  Fever:  - Tylenol or Motrin every 6 hours for fever > 100.4. You can alternate between the two as well if fever high.  Follow up with your primary care provider in the next 2-3 days.  Go to the emergency room with:  - Fever > 102 that does not respond to medications.  - Shortness of breath, difficulty breathing.  - Chest pain.  - Vomiting and unable to keep down fluids or medications  Thank you for choosing our [facility] for your medical condition today. Please be sure to follow up with your primary care provider in 2 days if no improvement, or as directed. If any worsening of your symptoms or other concerns call your primary care provider, return here or go to the emergency department.

## 2024-11-18 NOTE — ED PROVIDER NOTES
Patient Seen in: [facility]      History     Chief Complaint   Patient presents with    Cough     Entered by patient     Stated Complaint: Cough    Subjective:   This is a 39-year-old  female presents with a 1 week history of a sometimes productive otherwise dry cough.  Patient states the cough is progressively worsened and it is much worse yesterday and today.  Cough is accompanied by fatigue.  Patient states she has a mild sore throat yesterday and today which she believes is secondary to the coughing.  She has been taking NyQuil with minimal relief of the cough in the evening.  She denies any congestion, runny nose, earache, shortness of breath, or chest pain.  She also denies any abdominal pain, nausea, vomiting, or diarrhea.  Patient denies any known ill contacts.  Past medical history reviewed    The history is provided by the patient.             Objective:     Past Medical History:    Acid reflux    Breast disorder    Hypoglycemia    Iron deficiency    Migraines    Acid Reflux    Post partum depression    Postpartum anxiety (HCC)    Visual impairment              History reviewed. No pertinent surgical history.             Social History     Socioeconomic History    Marital status:    Tobacco Use    Smoking status: Never    Smokeless tobacco: Never   Vaping Use    Vaping status: Never Used   Substance and Sexual Activity    Alcohol use: Yes     Comment: rarely    Drug use: No   Other Topics Concern    Caffeine Concern Yes     Comment: soda, tea - 2-3 cups per day    Exercise No    Grew up on a farm No    History of tanning Yes    Outdoor occupation No    Breast feeding No    Reaction to local anesthetic No    Pt has a pacemaker No    Pt has a defibrillator No   Social History Narrative    The patient does not use an assistive device..      The patient does live in a home with stairs.        3 children (7 yo, 3yo and 17 mo - 6/2021)    Work - FT, insurance     Social  Drivers of Health     Food Insecurity: Low Risk  (10/5/2024)    Received from [facility]    Food Insecurity     Within the past 12 months, you worried that your food would run out before you got money to buy more.  : Never true     Within the past 12 months, the food you bought just didn't last and you didn't have money to get more. : Never true   Transportation Needs: Not At Risk (10/5/2024)    Received from [facility]    Transportation Needs     In the past 12 months, has lack of reliable transportation kept you from medical appointments, meetings, work or from getting things needed for daily living? : No    [facility] from [facility], [facility]    Social Connections    Received from [facility], [facility]    Housing Stability              Review of Systems   Constitutional:  Positive for activity change and fatigue.   HENT:  Positive for sore throat. Negative for congestion, ear pain, postnasal drip, rhinorrhea and sinus pain.    Eyes:  Negative for redness.   Respiratory:  Positive for cough. Negative for shortness of breath.    Cardiovascular:  Negative for chest pain.   Gastrointestinal:  Negative for abdominal pain, diarrhea, nausea and vomiting.   Musculoskeletal:  Positive for myalgias. Negative for arthralgias.   Neurological:  Negative for dizziness and headaches.       Positive for stated complaint: Cough  Other systems are as noted in HPI.  Constitutional and vital signs reviewed.      All other systems reviewed and negative except as noted above.    Physical Exam     ED Triage Vitals [11/18/24 1234]   /88   Pulse 75   Resp 18   Temp 98.5 °F (36.9 °C)   Temp src Oral   SpO2 98 %   O2 Device None (Room air)       Current Vitals:   Vital Signs  BP: 142/88  Pulse: 75  Resp: 18  Temp: 98.5 °F (36.9 °C)  Temp src: Oral    Oxygen Therapy  SpO2: 98 %  O2 Device: None (Room air)        Physical  Exam  Vitals and nursing note reviewed.   Constitutional:       General: She is not in acute distress.     Appearance: Normal appearance. She is normal weight. She is not ill-appearing or toxic-appearing.   HENT:      Head: Normocephalic.      Right Ear: Tympanic membrane and ear canal normal.      Left Ear: Ear canal normal.      Ears:      Comments: Clear effusion noted left ear     Nose: Nose normal. No congestion or rhinorrhea.      Mouth/Throat:      Mouth: Mucous membranes are moist.      Pharynx: Oropharynx is clear. No oropharyngeal exudate or posterior oropharyngeal erythema.   Eyes:      General:         Right eye: No discharge.         Left eye: No discharge.      Conjunctiva/sclera: Conjunctivae normal.      Pupils: Pupils are equal, round, and reactive to light.   Cardiovascular:      Rate and Rhythm: Normal rate and regular rhythm.      Heart sounds: Normal heart sounds. No murmur heard.  Pulmonary:      Effort: Pulmonary effort is normal. No respiratory distress.      Breath sounds: No wheezing, rhonchi or rales.      Comments: Coarse breath sounds noted throughout  Abdominal:      General: Abdomen is flat. There is no distension.      Palpations: Abdomen is soft.      Tenderness: There is no abdominal tenderness.   Musculoskeletal:      Cervical back: Neck supple.   Lymphadenopathy:      Cervical: No cervical adenopathy.   Skin:     General: Skin is warm and dry.   Neurological:      General: No focal deficit present.      Mental Status: She is alert and oriented to person, place, and time.   Psychiatric:         Mood and Affect: Mood normal.         Behavior: Behavior normal.         Thought Content: Thought content normal.         Judgment: Judgment normal.             ED Course     Labs Reviewed   POCT RAPID STREP - Normal   RAPID SARS-COV-2 BY PCR - Normal     Rapid strep and COVID tests are both negative, a chest x-ray was obtained and that was also negative for any pneumonia.  Patient was  advised of these tests we discussed management of her symptoms at home as well as if any worsening of her symptoms, other concerns to return here or follow-up with her primary care provider.     Counseled: Patient, regarding diagnosis, regarding treatment plan, regarding diagnostic results, regarding prescription, I have discussed with the patient the results of tests, differential diagnosis, and warning signs and symptoms that should prompt immediate return. The patient understands these instructions and agrees to the follow-up plan provided. There is no barriers to learning. Appropriate f/u given. Emergency precautions given. Patient agrees to return for any concerns/ problems/complications.                MDM             Medical Decision Making  Differential diagnoses: Viral upper respiratory infection, acute sinusitis, viral upper respiratory infection with cough, pneumonia, acute bronchitis, COVID, influenza, other viral syndrome.    Chest x-ray was negative most likely diagnosis is a viral upper respiratory infection with cough.  Cormorbidities adding complexity: GERD, iron deficiency,  My independent interpretation of studies: Rapid strep test and COVID test were both negative.  Will get a chest x-ray-chest x-ray is negative for pneumonia.  Social determinants of health affecting care: None noted  Shared decision making done by: The patient and myself          Disposition and Plan     Clinical Impression:  1. Cough    2. Viral upper respiratory tract infection with cough         Disposition:  Discharge  11/18/2024  2:02 pm    Follow-up:  Marky Lucas MD  [address] [place] [id number] [phone]    In 3 days            Medications Prescribed:  Current Discharge Medication List        START taking these medications    Details   benzonatate 200 MG Oral Cap Take 1 capsule (200 mg total) by mouth 3 (three) times daily as needed for cough.  Qty: 20 capsule, Refills: 0                  Supplementary Documentation:

## 2024-11-18 NOTE — TELEPHONE ENCOUNTER
Patient left message, stated she has cold like symptoms. Returned patient's call, left message to call back to discuss.     Please advise, Thank you

## 2024-12-09 ENCOUNTER — HOSPITAL ENCOUNTER (OUTPATIENT)
Dept: ULTRASOUND IMAGING | Facility: HOSPITAL | Age: 39
Discharge: HOME | End: 2024-12-09
Attending: OBSTETRICS & GYNECOLOGY
Payer: COMMERCIAL

## 2024-12-09 ENCOUNTER — HOSPITAL ENCOUNTER (OUTPATIENT)
Dept: MAMMOGRAPHY | Facility: HOSPITAL | Age: 39
Discharge: HOME | End: 2024-12-09
Attending: OBSTETRICS & GYNECOLOGY
Payer: COMMERCIAL

## 2024-12-09 ENCOUNTER — TELEPHONE (OUTPATIENT)
Dept: OBGYN | Age: 39
End: 2024-12-09

## 2024-12-09 DIAGNOSIS — R92.8 ABNORMAL MAMMOGRAM: ICD-10-CM

## 2024-12-09 DIAGNOSIS — Z12.31 SCREENING MAMMOGRAM FOR BREAST CANCER: Primary | ICD-10-CM

## 2024-12-09 PROCEDURE — 77065 DX MAMMO INCL CAD UNI: CPT | Performed by: OBSTETRICS & GYNECOLOGY

## 2024-12-09 PROCEDURE — 76642 ULTRASOUND BREAST LIMITED: CPT | Performed by: OBSTETRICS & GYNECOLOGY

## 2024-12-09 PROCEDURE — 77061 BREAST TOMOSYNTHESIS UNI: CPT | Performed by: OBSTETRICS & GYNECOLOGY

## 2025-02-03 ENCOUNTER — E-ADVICE (OUTPATIENT)
Dept: OBGYN | Age: 40
End: 2025-02-03

## 2025-02-28 ENCOUNTER — LAB ENCOUNTER (OUTPATIENT)
Dept: LAB | Age: 40
End: 2025-02-28
Attending: PHYSICIAN ASSISTANT
Payer: COMMERCIAL

## 2025-02-28 ENCOUNTER — OFFICE VISIT (OUTPATIENT)
Dept: DERMATOLOGY CLINIC | Facility: CLINIC | Age: 40
End: 2025-02-28

## 2025-02-28 DIAGNOSIS — B35.1 ONYCHOMYCOSIS: ICD-10-CM

## 2025-02-28 DIAGNOSIS — B35.1 ONYCHOMYCOSIS: Primary | ICD-10-CM

## 2025-02-28 LAB
ALBUMIN SERPL-MCNC: 4.8 G/DL (ref 3.2–4.8)
ALBUMIN/GLOB SERPL: 1.6 {RATIO} (ref 1–2)
ALP LIVER SERPL-CCNC: 109 U/L
ALT SERPL-CCNC: 22 U/L
ANION GAP SERPL CALC-SCNC: 10 MMOL/L (ref 0–18)
AST SERPL-CCNC: 23 U/L (ref ?–34)
BILIRUB SERPL-MCNC: 0.5 MG/DL (ref 0.3–1.2)
BUN BLD-MCNC: 11 MG/DL (ref 9–23)
BUN/CREAT SERPL: 13.6 (ref 10–20)
CALCIUM BLD-MCNC: 9.4 MG/DL (ref 8.7–10.4)
CHLORIDE SERPL-SCNC: 102 MMOL/L (ref 98–112)
CO2 SERPL-SCNC: 28 MMOL/L (ref 21–32)
CREAT BLD-MCNC: 0.81 MG/DL
EGFRCR SERPLBLD CKD-EPI 2021: 93 ML/MIN/1.73M2 (ref 60–?)
FASTING STATUS PATIENT QL REPORTED: NO
GLOBULIN PLAS-MCNC: 3 G/DL (ref 2–3.5)
GLUCOSE BLD-MCNC: 78 MG/DL (ref 70–99)
OSMOLALITY SERPL CALC.SUM OF ELEC: 288 MOSM/KG (ref 275–295)
POTASSIUM SERPL-SCNC: 4.3 MMOL/L (ref 3.5–5.1)
PROT SERPL-MCNC: 7.8 G/DL (ref 5.7–8.2)
SODIUM SERPL-SCNC: 140 MMOL/L (ref 136–145)

## 2025-02-28 PROCEDURE — 99213 OFFICE O/P EST LOW 20 MIN: CPT | Performed by: PHYSICIAN ASSISTANT

## 2025-02-28 PROCEDURE — 80053 COMPREHEN METABOLIC PANEL: CPT

## 2025-02-28 PROCEDURE — 36415 COLL VENOUS BLD VENIPUNCTURE: CPT

## 2025-02-28 NOTE — PROGRESS NOTES
HPI:    Patient ID: Rosita Shirley is a 39 year old female.    Patient presents for nail issue that has been going for the past 2 years. Was given clotirmazole solution to use but has not improved. Cleared on the righte large toenail but still occurring on the left toenail. No draining or tendenress noted. Has not been improving. Hx of allergies to medications noted.         Review of Systems   Constitutional:  Negative for chills and fever.   Musculoskeletal:  Negative for arthralgias and myalgias.   Skin:  Positive for rash. Negative for color change and wound.            Current Outpatient Medications   Medication Sig Dispense Refill    Norethindrone 0.35 MG Oral Tab Take 1 tablet (0.35 mg total) by mouth daily.      fluticasone propionate 50 MCG/ACT Nasal Suspension by Each Nare route daily.      NON FORMULARY       benzonatate 200 MG Oral Cap Take 1 capsule (200 mg total) by mouth 3 (three) times daily as needed for cough. 20 capsule 0    omeprazole 20 MG Oral Capsule Delayed Release Take 1 capsule (20 mg total) by mouth every morning before breakfast.      BLACK ELDERBERRY OR Take by mouth.      Dexmethylphenidate HCl ER 30 MG Oral Capsule SR 24 Hr Take 1 capsule (30 mg total) by mouth daily.      Cetirizine HCl (ZYRTEC ALLERGY) 10 MG Oral Cap       Nutritional Supplements (VITAMIN D BOOSTER OR) As Directed.      Clindamycin Phosphate 1 % External Gel Apply 1 Application topically 2 (two) times daily. Use bid as directed to acne on chest (Patient not taking: Reported on 2/28/2025) 60 g 12    Tretinoin 0.05 % External Cream Apply a small amount to areas of acne on forehead/along hairline as directed at bedtime. (Patient not taking: Reported on 2/28/2025) 20 g 3    naproxen 500 MG Oral Tab Take 1 tablet (500 mg total) by mouth 2 (two) times daily with meals. (Patient not taking: Reported on 5/16/2024) 28 tablet 0     Allergies:Allergies[1]   LMP 11/11/2024 (Exact Date)   There is no height or weight on file to  calculate BMI.  PHYSICAL EXAM:   Physical Exam  Constitutional:       General: She is not in acute distress.     Appearance: Normal appearance.   Skin:     General: Skin is warm and dry.      Findings: Rash present.      Comments: Yellowing toenail noted on the left toenail. No draining or tendenress noted. Slight sclaing noted. No erythema noted.    Neurological:      Mental Status: She is alert and oriented to person, place, and time.                ASSESSMENT/PLAN:   1. Onychomycosis  -After discussion with patient, advised the following:  -Will check CMP first  -If normal then will send lamasil  -Educated to take 1 pill daily for 3 months  -Return after 1 month of taking to ensure it is helping  -Avoid pedicures until the infection is resolved.   -To call or follow-up with worsening symptoms or concerns  -Patient was agreeable to plan and will comply with discussion above.     - Comp Metabolic Panel (14); Future      Orders Placed This Encounter   Procedures    Comp Metabolic Panel (14)       Meds This Visit:  Requested Prescriptions      No prescriptions requested or ordered in this encounter       Imaging & Referrals:  None         ID#[id number]       [1]   Allergies  Allergen Reactions    Adhesive Tape      Other reaction(s): ADHESIVE BANDAGE    Latex      Other reaction(s): Rash    Ritalin [Methylphenidate] Coughing

## 2025-03-05 ENCOUNTER — PATIENT MESSAGE (OUTPATIENT)
Dept: FAMILY MEDICINE CLINIC | Facility: CLINIC | Age: 40
End: 2025-03-05

## 2025-03-05 DIAGNOSIS — R74.8 ELEVATED ALKALINE PHOSPHATASE LEVEL: Primary | ICD-10-CM

## 2025-03-05 NOTE — TELEPHONE ENCOUNTER
I recommend she see me first, we can order any necessary labs at her visit. She had annual labs in October 2024.   Autism spectrum disorder   F84.0   Autism spectrum disorder   F84.0   Autism spectrum disorder   F84.0   Autism spectrum disorder   F84.0   Autism spectrum disorder   F84.0   Autism spectrum disorder   F84.0

## 2025-03-05 NOTE — TELEPHONE ENCOUNTER
I referred her to endocrinology for the alkaline phosphatase. We can discuss vascular when I see her. Thanks!

## 2025-03-13 ENCOUNTER — OFFICE VISIT (OUTPATIENT)
Dept: FAMILY MEDICINE CLINIC | Facility: CLINIC | Age: 40
End: 2025-03-13
Payer: COMMERCIAL

## 2025-03-13 VITALS
SYSTOLIC BLOOD PRESSURE: 120 MMHG | TEMPERATURE: 98 F | OXYGEN SATURATION: 98 % | BODY MASS INDEX: 30.32 KG/M2 | WEIGHT: 177.63 LBS | HEIGHT: 64 IN | DIASTOLIC BLOOD PRESSURE: 80 MMHG | HEART RATE: 100 BPM

## 2025-03-13 DIAGNOSIS — R39.15 URINARY URGENCY: ICD-10-CM

## 2025-03-13 DIAGNOSIS — R35.1 NOCTURIA: ICD-10-CM

## 2025-03-13 DIAGNOSIS — E61.1 IRON DEFICIENCY: ICD-10-CM

## 2025-03-13 DIAGNOSIS — Z00.00 ADULT GENERAL MEDICAL EXAMINATION: Primary | ICD-10-CM

## 2025-03-13 DIAGNOSIS — I83.813 VARICOSE VEINS OF BOTH LOWER EXTREMITIES WITH PAIN: ICD-10-CM

## 2025-03-13 DIAGNOSIS — M79.641 PAIN IN BOTH HANDS: ICD-10-CM

## 2025-03-13 DIAGNOSIS — M79.642 PAIN IN BOTH HANDS: ICD-10-CM

## 2025-03-13 DIAGNOSIS — N92.1 MENORRHAGIA WITH IRREGULAR CYCLE: ICD-10-CM

## 2025-03-13 DIAGNOSIS — N63.11 LUMP IN UPPER OUTER QUADRANT OF RIGHT BREAST: ICD-10-CM

## 2025-03-13 DIAGNOSIS — R92.8 ABNORMAL MAMMOGRAM OF RIGHT BREAST: ICD-10-CM

## 2025-03-13 DIAGNOSIS — R92.30 DENSE BREASTS: ICD-10-CM

## 2025-03-13 DIAGNOSIS — R74.8 ELEVATED ALKALINE PHOSPHATASE LEVEL: ICD-10-CM

## 2025-03-13 PROCEDURE — 99214 OFFICE O/P EST MOD 30 MIN: CPT | Performed by: NURSE PRACTITIONER

## 2025-03-13 PROCEDURE — 99396 PREV VISIT EST AGE 40-64: CPT | Performed by: NURSE PRACTITIONER

## 2025-03-13 NOTE — PROGRESS NOTES
Chief Complaint:   Chief Complaint   Patient presents with    Physical       HPI:   This is a 39 year old female coming in for annual physical. Has been having increased alkaline phosphatase levels, planning to see endocrinology for f/u. Sees gynecology for menorrhagia with irregular cycles. Has a history of iron deficiency anemia. Reports worsening varicose veins in BLE causing throbbing pain, R>L. Also having increased urinary urgency and nocturia every night. Reports some leakage with coughing and sneezing especially during menstrual cycle. Reports having joint pain in fingers of both hands that can make opening jars/bottles challenging. States pain is worse in the morning, subsides, then worsens in the evening. Denies any swelling or redness to joints. Has been taking ibuprofen. Patient also reports a noting lump in right breast upper outer quadrant x 1 month. Denies any pain, itching, discharge, or any abnormalities to external breast. Has diagnostic mammogram planned for June due to abnormality in mammogram in right breast in December.    Results for orders placed or performed in visit on 02/28/25   Comp Metabolic Panel (14)    Collection Time: 02/28/25 12:03 PM   Result Value Ref Range    Glucose 78 70 - 99 mg/dL    Sodium 140 136 - 145 mmol/L    Potassium 4.3 3.5 - 5.1 mmol/L    Chloride 102 98 - 112 mmol/L    CO2 28.0 21.0 - 32.0 mmol/L    Anion Gap 10 0 - 18 mmol/L    BUN 11 9 - 23 mg/dL    Creatinine 0.81 0.55 - 1.02 mg/dL    BUN/CREA Ratio 13.6 10.0 - 20.0    Calcium, Total 9.4 8.7 - 10.4 mg/dL    Calculated Osmolality 288 275 - 295 mOsm/kg    eGFR-Cr 93 >=60 mL/min/1.73m2    ALT 22 10 - 49 U/L    AST 23 <34 U/L    Alkaline Phosphatase 109 (H) 37 - 98 U/L    Bilirubin, Total 0.5 0.3 - 1.2 mg/dL    Total Protein 7.8 5.7 - 8.2 g/dL    Albumin 4.8 3.2 - 4.8 g/dL    Globulin  3.0 2.0 - 3.5 g/dL    A/G Ratio 1.6 1.0 - 2.0    Patient Fasting for CMP? No              Past Medical History:    Acid reflux     Breast disorder    Hypoglycemia    Iron deficiency    Migraines    Acid Reflux    Post partum depression    Postpartum anxiety (HCC)    Visual impairment     History reviewed. No pertinent surgical history.  Social History:  Social History     Socioeconomic History    Marital status:    Tobacco Use    Smoking status: Never    Smokeless tobacco: Never   Vaping Use    Vaping status: Never Used   Substance and Sexual Activity    Alcohol use: Yes     Comment: rarely    Drug use: No   Other Topics Concern    Caffeine Concern Yes     Comment: soda, tea - 2-3 cups per day    Exercise No    Grew up on a farm No    History of tanning Yes    Outdoor occupation No    Breast feeding No    Reaction to local anesthetic No    Pt has a pacemaker No    Pt has a defibrillator No   Social History Narrative    The patient does not use an assistive device..      The patient does live in a home with stairs.        3 children (5 yo, 3yo and 17 mo - 6/2021)    Work - FT, insurance     Social Drivers of Health     Food Insecurity: No Food Insecurity (3/13/2025)    NCSS - Food Insecurity     Worried About Running Out of Food in the Last Year: No     Ran Out of Food in the Last Year: No   Transportation Needs: No Transportation Needs (3/13/2025)    NCSS - Transportation     Lack of Transportation: No   Housing Stability: Not At Risk (3/13/2025)    NCSS - Housing/Utilities     Has Housing: Yes     Worried About Losing Housing: No     Unable to Get Utilities: No     Family History:  Family History   Problem Relation Age of Onset    Uterine Cancer Mother     Lipids Mother         hyperlipidemia    Hypertension Mother     Heart Disease Mother     Lipids Father         hyperlipidema    Hypertension Father     Allergies Sister     Stroke Paternal Grandmother         CVA (stroke)    Other (Other) Paternal Grandmother         NO DX IN NG, JUST 'OTHER'    Alcohol and Other Disorders Associated Paternal Grandfather         alcoholism     Colon Cancer Paternal Grandfather     Cancer Paternal Grandfather         lung--patient stated only colon ca    Breast Cancer Paternal Aunt 55    Asthma Other         cousin    Ovarian Cancer Neg     Pancreatic Cancer Neg     Prostate Cancer Neg      Allergies:  Allergies[1]  Current Meds:  Current Outpatient Medications   Medication Sig Dispense Refill    terbinafine 250 MG Oral Tab Take 1 tablet (250 mg total) by mouth daily. 7 tablet 1    fluticasone propionate 50 MCG/ACT Nasal Suspension by Each Nare route daily.      NON FORMULARY       naproxen 500 MG Oral Tab Take 1 tablet (500 mg total) by mouth 2 (two) times daily with meals. 28 tablet 0    omeprazole 20 MG Oral Capsule Delayed Release Take 1 capsule (20 mg total) by mouth every morning before breakfast.      BLACK ELDERBERRY OR Take by mouth.      Dexmethylphenidate HCl ER 30 MG Oral Capsule SR 24 Hr Take 1 capsule (30 mg total) by mouth daily.      Cetirizine HCl (ZYRTEC ALLERGY) 10 MG Oral Cap       Nutritional Supplements (VITAMIN D BOOSTER OR) As Directed.        Counseling given: No       REVIEW OF SYSTEMS:   CONSTITUTIONAL:  Denies unusual weight gain/loss, fever, chills, or fatigue.  HEENT:  Eyes:  Denies eye pain, visual loss, blurred vision. Denies hearing loss, sneezing, congestion, runny nose or sore throat.  INTEGUMENTARY:  Denies rashes, itching, skin lesion.  CARDIOVASCULAR:  Denies chest pain, palpitations, edema, dyspnea on exertion or at rest. +varicose veins in BLE  RESPIRATORY:  Denies shortness of breath, wheezing, cough or sputum.  BREAST: lump to right breast, upper outer quadrant  GASTROINTESTINAL:  Denies abdominal pain, nausea, vomiting, constipation, diarrhea, or blood in stool.  GENITOURINARY: Denies dysuria, hematuria, frequency. +urinary urgency  MUSCULOSKELETAL:  Denies weakness, muscle aches, back pain, swelling or stiffness. +joint pain in bilateral hands, hx of back pain  NEUROLOGICAL:  Denies headache, seizures,  dizziness.    EXAM:   /80   Pulse 100   Temp 98 °F (36.7 °C)   Ht 5' 4\" (1.626 m)   Wt 177 lb 9.6 oz (80.6 kg)   LMP 02/20/2025 (Exact Date)   SpO2 98%   BMI 30.48 kg/m²  Estimated body mass index is 30.48 kg/m² as calculated from the following:    Height as of this encounter: 5' 4\" (1.626 m).    Weight as of this encounter: 177 lb 9.6 oz (80.6 kg).   Vital signs reviewed.Appears stated age, well groomed, in no acute distress.  Physical Exam:  GEN:  Patient is alert, awake and oriented, well developed, well nourished.  HEENT:  Head:  Normocephalic, atraumatic Eyes: EOMI, PERRLA, conjunctivae clear bilaterally, no eye discharge Ears: External normal, TM clear bilaterally. Nose: patent, no nasal discharge. Throat:  No tonsillar erythema or exudate.  Mouth:  No oral lesions, good dentition.  NECK: Supple, no CLAD, no thyromegaly.  SKIN: No rashes, no skin lesion, no bruising, good turgor.  HEART:  Regular rate and rhythm, no murmurs, rubs or gallops.  LUNGS: Clear to auscultation bilterally, no rales/rhonchi/wheezing.  BREAST: left breast without lumps, masses, or lesions, no rashes or nipple retraction noted, right breast with dense tissue palpated throughout with a more concentrated possible lump/dense tissue in the upper outer quadrant  ABDOMEN:  Soft, nondistended, nontender, bowel sounds normal in all 4 quadrants, no masses, no hepatosplenomegaly.  BACK: No tenderness to palpation, FROM.  EXTREMITIES:  No edema, no cyanosis, no clubbing, FROM, 2+ dorsalis pedis pulses bilaterally. No joint swelling, redness, or tenderness in bilateral hands. Hand  5+ BL.  NEURO:  No deficit, normal gait, strength and tone, sensory intact, normal reflexes.    ASSESSMENT AND PLAN:   1. Adult general medical examination  - labs planned for October  - CBC With Differential With Platelet; Future  - Comp Metabolic Panel (14); Future  - Hemoglobin A1C; Future  - Lipid Panel; Future  - Assay, Thyroid Stim Hormone;  Future    2. Elevated alkaline phosphatase level  - follow up with endocrinology   - Comp Metabolic Panel (14); Future    3. Menorrhagia with irregular cycle  - sees gynecology     4. Iron deficiency  - will check labs as below  - CBC With Differential With Platelet; Future  - Ferritin; Future  - Iron And Tibc; Future    5. Varicose veins of both lower extremities with pain  - referral to vascular surgery   - Vascular Surgery - In Network    6. Urinary urgency  - discussed lifestyle changes like no fluids after 6 pm, avoid caffeine after 12 pm, regular Kegel exercises  - recommended physical therapy  - PHYSICAL THERAPY - INTERNAL    7. Nocturia  - see above  - PHYSICAL THERAPY - INTERNAL    8. Pain in both hands  - may use OTC NSAIDs and tylenol   - reccommended occupational therapy   - Occupational Therapy Referral - [facility]    9. Dense breasts  - will get new mammogram to assess area of concern   - notify office if any new changes noted, such as pain, discharge, nipple retraction, or changes to dense tissue  - [facility] GANGA 2D+3D DIAGNOSTIC HORACE RIGHT (CPT=77065/07577); Future  - US BREAST RIGHT LIMITED (CPT=76642); Future    10. Abnormal mammogram of right breast  - see below  - HORACE GANGA 2D+3D DIAGNOSTIC HORACE RIGHT (CPT=77065/95650); Future  - US BREAST RIGHT LIMITED (CPT=76642); Future    11. Lump in upper outer quadrant of right breast  - see below  - HORACE GANGA 2D+3D DIAGNOSTIC HORACE RIGHT (CPT=77065/49787); Future  - US BREAST RIGHT LIMITED (CPT=76642); Future      Meds & Refills for this Visit:  Requested Prescriptions      No prescriptions requested or ordered in this encounter         Problem List:  Patient Active Problem List   Diagnosis    Low back pain    Migraines    Sacroiliac joint dysfunction of left side    Hamstring tightness of both lower extremities    Trigger point with back pain    Pelvic floor dysfunction    Anxiety    GERD (gastroesophageal reflux disease)    Hypercholesterolemia    Obesity     Lumbosacral disc herniation       Sonia Fontenot, APRN  3/13/2025  8:41 AM         [1]   Allergies  Allergen Reactions    Adhesive Tape      Other reaction(s): ADHESIVE BANDAGE    Latex      Other reaction(s): Rash    Ritalin [Methylphenidate] Coughing

## 2025-03-20 ENCOUNTER — OFFICE VISIT (OUTPATIENT)
Facility: CLINIC | Age: 40
End: 2025-03-20
Payer: COMMERCIAL

## 2025-03-20 VITALS
DIASTOLIC BLOOD PRESSURE: 72 MMHG | BODY MASS INDEX: 30.56 KG/M2 | HEIGHT: 64 IN | SYSTOLIC BLOOD PRESSURE: 128 MMHG | WEIGHT: 179 LBS

## 2025-03-20 DIAGNOSIS — I83.813 VARICOSE VEINS OF BILATERAL LOWER EXTREMITIES WITH PAIN: Primary | ICD-10-CM

## 2025-03-20 PROCEDURE — 99203 OFFICE O/P NEW LOW 30 MIN: CPT

## 2025-03-20 RX ORDER — IBUPROFEN 200 MG
200 TABLET ORAL EVERY 6 HOURS PRN
COMMUNITY

## 2025-03-20 NOTE — PROGRESS NOTES
VASCULAR SURGERY CONSULT NOTE      Rosita Shirley   :  10/14/1985  MR#  [id number]    REFERRING PHYSICIAN:  Sonia Fontenot  PRIMARY CARE PHYSICIAN:  Marky Lucas MD    Chief Complaint   Patient presents with    Consult    Varicose Veins     Patient c/o varicose veins on bilateral leg mostly RT leg below knee and lower extremity moderate pain, cramps, throbbing in between or after three pregnancies.  No testing  No DM II  No DVT Hx  No prev vein procedures or legs surgeries  No currently medical compressions use; she used to wear them last year.           HPI:    The patient is a 39 year old female who has been referred to the clinic today for an evaluation of her bilateral lower extremity heaviness, tiredness, edema, and pain after prolonged standing (right worse than left). The pain is reported in her medial thighs, calves, and distal legs. This is interfering with her activities of daily living and exercise. She has not worn compression stockings in the recent past. No history of endovenous ablations performed in the past.   She also endorses a medical history of lumbosacral disc herniation.     PAST MEDICAL HISTORY:     Past Medical History:    Acid reflux    Breast disorder    Hypoglycemia    Iron deficiency    Migraines    Acid Reflux    Post partum depression    Postpartum anxiety (HCC)    Visual impairment       PAST SURGICAL HISTORY:   No past surgical history on file.     MEDICATIONS:     Current Outpatient Medications   Medication Sig Dispense Refill    ibuprofen 200 MG Oral Tab Take 1 tablet (200 mg total) by mouth every 6 (six) hours as needed for Pain.      terbinafine 250 MG Oral Tab Take 1 tablet (250 mg total) by mouth daily. 7 tablet 1    fluticasone propionate 50 MCG/ACT Nasal Suspension by Each Nare route daily.      NON FORMULARY       naproxen 500 MG Oral Tab Take 1 tablet (500 mg total) by mouth 2 (two) times daily with meals. 28 tablet 0    omeprazole 20 MG Oral Capsule Delayed Release  Take 1 capsule (20 mg total) by mouth every morning before breakfast.      BLACK ELDERBERRY OR Take by mouth.      Dexmethylphenidate HCl ER 30 MG Oral Capsule SR 24 Hr Take 1 capsule (30 mg total) by mouth daily.      Cetirizine HCl (ZYRTEC ALLERGY) 10 MG Oral Cap       Nutritional Supplements (VITAMIN D BOOSTER OR) As Directed.         ALLERGIES:   Allergies[1]    SOCIAL HISTORY:     Social History     Socioeconomic History    Marital status:    Tobacco Use    Smoking status: Never    Smokeless tobacco: Never   Vaping Use    Vaping status: Never Used   Substance and Sexual Activity    Alcohol use: Yes     Comment: rarely    Drug use: No   Other Topics Concern    Caffeine Concern Yes     Comment: soda, tea - 2-3 cups per day    Exercise No    Grew up on a farm No    History of tanning Yes    Outdoor occupation No    Breast feeding No    Reaction to local anesthetic No    Pt has a pacemaker No    Pt has a defibrillator No   Social History Narrative    The patient does not use an assistive device..      The patient does live in a home with stairs.        3 children (7 yo, 3yo and 17 mo - 6/2021)    Work - FT, insurance     Social Drivers of Health     Food Insecurity: No Food Insecurity (3/13/2025)    NCSS - Food Insecurity     Worried About Running Out of Food in the Last Year: No     Ran Out of Food in the Last Year: No   Transportation Needs: No Transportation Needs (3/13/2025)    NCSS - Transportation     Lack of Transportation: No   Housing Stability: Not At Risk (3/13/2025)    NCSS - Housing/Utilities     Has Housing: Yes     Worried About Losing Housing: No     Unable to Get Utilities: No        FAMILY HISTORY:     Family History   Problem Relation Age of Onset    Uterine Cancer Mother     Lipids Mother         hyperlipidemia    Hypertension Mother     Heart Disease Mother     Lipids Father         hyperlipidema    Hypertension Father     Allergies Sister     Stroke Paternal Grandmother          CVA (stroke)    Other (Other) Paternal Grandmother         NO DX IN NG, JUST 'OTHER'    Alcohol and Other Disorders Associated Paternal Grandfather         alcoholism    Colon Cancer Paternal Grandfather     Cancer Paternal Grandfather         lung--patient stated only colon ca    Breast Cancer Paternal Aunt 55    Asthma Other         cousin    Ovarian Cancer Neg     Pancreatic Cancer Neg     Prostate Cancer Neg        ROS:   A 12 point review of systems with pertinent positives and negatives listed in the HPI.    PHYSICAL EXAM:   /72 (BP Location: Right arm)   Ht 5' 4\" (1.626 m)   Wt 179 lb (81.2 kg)   LMP 02/20/2025 (Exact Date)   BMI 30.73 kg/m²   GENERAL: alert and orientated X 3, well developed, well nourished, in no apparent distress  HEENT: ears and throat are clear  NECK: supple, no lymphadenopathy, thyroid wnl  CAROTID: No bruits  RESPIRATORY: no rales, rhonchi, or wheezes B  CARDIO: RRR without murmur, no murmur, no gallop   ABDOMEN: soft, non-tender with no palpable aneurysm or masses  BACK: normal, no tenderness  SKIN: no rashes, warm and dry  NEURO/PSYCH: orientated x3, normal mood and affect, no sensory or motor deficit  EXTREMITIES: full range of motion, no tenderness or edema in either leg.   VASCULAR  Pulse exam right: femoral 2+, DP  2+, PT  2+  Pulse exam left: femoral  2+, DP  2+, PT  2+      Vein Assessment:      Mild scattered bilateral  LE (bulgy on the right) varicose veins with no significant hemosiderin deposition.    IMPRESSION:   Bilateral  lower extremity pain due to venous insufficiency.   Symptomatic calf varicosities.    PLAN:     We reviewed the options for management of venous insufficiency, including conservative therapy, sclerotherapy, stab phlebectomy, and endovenous laser ablation. The patient was educated in the benign condition of venous disease.  I have given the patient a prescription for Grade II compression stockings (20-30 mmHg, thigh-highs). We reviewed the  importance of wearing these daily and consistently. We also reviewed other types of conservative measures, such as periodic leg elevation, walking for exercise, analgesic use, attempts at weight loss, and the avoidance of prolonged standing.  I have sent the patient for a venous reflux ultrasound. Should the ultrasound study reveal venous reflux with dilation and she does not have relief of symptoms with conservative therapy, then endovenous laser ablation may be a possible treatment option.  I explained to the patient that her insurance company may require a 6-12 week trial period of conservative therapy prior to authorizing endovenous ablation treatment.  The patient understood and agreed to proceed with this treatment plan, all of her questions were answered during this clinic visit.       Thank you for allowing to participate in the care of your patient.     MARIE Hernandez  Division of Vascular Surgery.       [1]   Allergies  Allergen Reactions    Adhesive Tape      Other reaction(s): ADHESIVE BANDAGE    Latex      Other reaction(s): Rash    Ritalin [Methylphenidate] Coughing

## 2025-04-02 ENCOUNTER — HOSPITAL ENCOUNTER (OUTPATIENT)
Dept: ULTRASOUND IMAGING | Facility: HOSPITAL | Age: 40
Discharge: HOME | End: 2025-04-02
Attending: NURSE PRACTITIONER
Payer: COMMERCIAL

## 2025-04-02 ENCOUNTER — HOSPITAL ENCOUNTER (OUTPATIENT)
Dept: MAMMOGRAPHY | Facility: HOSPITAL | Age: 40
Discharge: HOME | End: 2025-04-02
Attending: NURSE PRACTITIONER
Payer: COMMERCIAL

## 2025-04-02 DIAGNOSIS — N63.11 LUMP IN UPPER OUTER QUADRANT OF RIGHT BREAST: ICD-10-CM

## 2025-04-02 DIAGNOSIS — R92.8 ABNORMAL MAMMOGRAM OF RIGHT BREAST: ICD-10-CM

## 2025-04-02 DIAGNOSIS — R92.30 DENSE BREASTS: ICD-10-CM

## 2025-04-02 PROCEDURE — 77065 DX MAMMO INCL CAD UNI: CPT | Performed by: NURSE PRACTITIONER

## 2025-04-02 PROCEDURE — 76642 ULTRASOUND BREAST LIMITED: CPT | Performed by: NURSE PRACTITIONER

## 2025-04-02 PROCEDURE — 77061 BREAST TOMOSYNTHESIS UNI: CPT | Performed by: NURSE PRACTITIONER

## 2025-04-04 LAB — HM MAMMOGRAM RIGHT: ABNORMAL

## 2025-04-14 ENCOUNTER — NURSE ONLY (OUTPATIENT)
Facility: CLINIC | Age: 40
End: 2025-04-14

## 2025-04-14 DIAGNOSIS — I83.813 VARICOSE VEINS OF BILATERAL LOWER EXTREMITIES WITH PAIN: ICD-10-CM

## 2025-04-14 PROCEDURE — 93970 EXTREMITY STUDY: CPT | Performed by: SURGERY

## 2025-04-16 ENCOUNTER — PATIENT MESSAGE (OUTPATIENT)
Dept: FAMILY MEDICINE CLINIC | Facility: CLINIC | Age: 40
End: 2025-04-16

## 2025-05-02 ENCOUNTER — OFFICE VISIT (OUTPATIENT)
Dept: DERMATOLOGY CLINIC | Facility: CLINIC | Age: 40
End: 2025-05-02

## 2025-05-02 DIAGNOSIS — B35.1 ONYCHOMYCOSIS: Primary | ICD-10-CM

## 2025-05-02 PROCEDURE — 99213 OFFICE O/P EST LOW 20 MIN: CPT | Performed by: PHYSICIAN ASSISTANT

## 2025-05-02 NOTE — PROGRESS NOTES
HPI:    Patient ID: Rosita Shirley is a 39 year old female.    Patient presents for follow-up on onychomycosis. Feels she is unsure if there is improvement in her symptoms. No draining or tenderness noted. Still using clotrimazole solution. No allergies to medications noted. No erythema noted. No scaling noted.         Review of Systems   Constitutional:  Negative for chills and fever.   Musculoskeletal:  Negative for arthralgias and myalgias.   Skin:  Positive for rash. Negative for color change and wound.          Current Medications[1]  Allergies:Allergies[2]   LMP 03/12/2025 (Approximate)   There is no height or weight on file to calculate BMI.  PHYSICAL EXAM:   Physical Exam  Constitutional:       General: She is not in acute distress.     Appearance: Normal appearance.   Skin:     General: Skin is warm and dry.      Findings: Rash present.      Comments: Yellowing has improved. No draining or tendenress noted. No scaling noted. No erythema noted.    Neurological:      Mental Status: She is alert and oriented to person, place, and time.                ASSESSMENT/PLAN:   1. Onychomycosis  -After discussion with patient, advised the following:  -Complete physical labs  -Once LFTs are confirmed normal, we will then send one more pulse of lamasil  -Educated to take 1 pill daily for 1 week.   -Stop clotrimazole solution.   -To call or follow-up with worsening symptoms or concerns  -Patient was agreeable to plan and will comply with discussion above.         No orders of the defined types were placed in this encounter.      Meds This Visit:  Requested Prescriptions      No prescriptions requested or ordered in this encounter       Imaging & Referrals:  None         ID#[id number]       [1]   Current Outpatient Medications   Medication Sig Dispense Refill    ibuprofen 200 MG Oral Tab Take 1 tablet (200 mg total) by mouth every 6 (six) hours as needed for Pain.      terbinafine 250 MG Oral Tab Take 1 tablet (250 mg total) by  mouth daily. 7 tablet 1    fluticasone propionate 50 MCG/ACT Nasal Suspension by Each Nare route daily.      NON FORMULARY       naproxen 500 MG Oral Tab Take 1 tablet (500 mg total) by mouth 2 (two) times daily with meals. 28 tablet 0    omeprazole 20 MG Oral Capsule Delayed Release Take 1 capsule (20 mg total) by mouth every morning before breakfast.      BLACK ELDERBERRY OR Take by mouth.      Dexmethylphenidate HCl ER 30 MG Oral Capsule SR 24 Hr Take 1 capsule (30 mg total) by mouth daily.      Cetirizine HCl (ZYRTEC ALLERGY) 10 MG Oral Cap       Nutritional Supplements (VITAMIN D BOOSTER OR) As Directed.     [2]   Allergies  Allergen Reactions    Adhesive Tape      Other reaction(s): ADHESIVE BANDAGE    Latex      Other reaction(s): Rash    Ritalin [Methylphenidate] Coughing

## 2025-05-20 ENCOUNTER — OFFICE VISIT (OUTPATIENT)
Facility: CLINIC | Age: 40
End: 2025-05-20
Payer: COMMERCIAL

## 2025-05-20 VITALS
HEIGHT: 64 IN | DIASTOLIC BLOOD PRESSURE: 82 MMHG | BODY MASS INDEX: 30.56 KG/M2 | SYSTOLIC BLOOD PRESSURE: 120 MMHG | WEIGHT: 179 LBS

## 2025-05-20 DIAGNOSIS — I83.813 VARICOSE VEINS OF BILATERAL LOWER EXTREMITIES WITH PAIN: Primary | ICD-10-CM

## 2025-05-20 PROCEDURE — 99213 OFFICE O/P EST LOW 20 MIN: CPT | Performed by: SURGERY

## 2025-05-20 NOTE — PROGRESS NOTES
Samer F. Najjar, MD.  Vascular and Endovascular Surgery          VASCULAR SURGERY VISIT NOTE       Rosita Shirley   :  10/14/1985  MR#  [id number]    REFERRING PHYSICIAN:  No ref. provider found  PRIMARY CARE PHYSICIAN:  Marky Lucas MD      HPI:    The patient is a 39 year old female who is here for follow-up for her bilateral lower extremity heaviness, tiredness and pain after prolonged standing. This continues to interfere with her activities of daily living and exercise.  She was seen previously and determined to benefit from a trial of conservative therapy for venous insufficiency diagnosed clinically and after a positive venous reflux study. Conservative measures including periodic leg elevation, walking for exercise, attempts at weight loss, avoiding prolonged standing, and oral analgesics including wearing compression Grade II compression stockings (20-30 mm Hg) for greater than 6 weeks. All of these afforts have all failed to control the severe symptoms. She reports that the stockings were not helpful as her symptoms are unchanged.   She is interested in a more permanent treatment. There has been no change in her medical or surgical history since the last visit.      MEDICATIONS:   Current Medications[1]    ALLERGIES:   Allergies[2]    PHYSICAL EXAM:   /82   Ht 5' 4\" (1.626 m)   Wt 179 lb (81.2 kg)   LMP 2025 (Approximate)   BMI 30.73 kg/m²     Scattered varicose veins along the bilateral LE        IMPRESSION:   Bilateral lower extremity pain due to venous insufficiency.   Symptomatic calf varicosities.    PLAN:     We reviewed the options for management for venous insufficiency, including conservative therapy, medical-grade compression hose, sclerotherapy, stab phlebectomy, and endovenous laser ablation. The patient was again educated in the benign condition of venous disease.  Given that the patient has worn the compression stockings for the past several weeks without improvement, I  have recommended endovenous therapy with laser for her refluxing veins (B GSV) in addition to the possible need for stab phlebectomies. We discussed the risks of DVT/PE/death, bleeding, allergic reaction, nerve injury, infection, hyperpigmentation, chronic pain, recurrence and need for further interventions among other complications.   The patient understood and agreed to proceed with this treatment plan. All of her questions were answered during this clinic visit.   She wishes to proceed once approval is attained.    Thank you for allowing to participate in the care of your patients . Please do not hesitate to contact my office with any questions.      Sincerely,  Samer F. Najjar MD  Division of Vascular Surgery               [1]   Current Outpatient Medications   Medication Sig Dispense Refill    ibuprofen 200 MG Oral Tab Take 1 tablet (200 mg total) by mouth every 6 (six) hours as needed for Pain.      terbinafine 250 MG Oral Tab Take 1 tablet (250 mg total) by mouth daily. 7 tablet 1    fluticasone propionate 50 MCG/ACT Nasal Suspension by Each Nare route daily.      NON FORMULARY       omeprazole 20 MG Oral Capsule Delayed Release Take 1 capsule (20 mg total) by mouth every morning before breakfast.      BLACK ELDERBERRY OR Take by mouth.      Dexmethylphenidate HCl ER 30 MG Oral Capsule SR 24 Hr Take 1 capsule (30 mg total) by mouth daily.      Cetirizine HCl (ZYRTEC ALLERGY) 10 MG Oral Cap       Nutritional Supplements (VITAMIN D BOOSTER OR) As Directed.      naproxen 500 MG Oral Tab Take 1 tablet (500 mg total) by mouth 2 (two) times daily with meals. 28 tablet 0   [2]   Allergies  Allergen Reactions    Adhesive Tape      Other reaction(s): ADHESIVE BANDAGE    Latex      Other reaction(s): Rash    Ritalin [Methylphenidate] Coughing

## 2025-05-21 ENCOUNTER — LAB ENCOUNTER (OUTPATIENT)
Dept: LAB | Facility: HOSPITAL | Age: 40
End: 2025-05-21
Attending: NURSE PRACTITIONER
Payer: COMMERCIAL

## 2025-05-21 ENCOUNTER — PATIENT MESSAGE (OUTPATIENT)
Dept: FAMILY MEDICINE CLINIC | Facility: CLINIC | Age: 40
End: 2025-05-21

## 2025-05-21 DIAGNOSIS — Z00.00 ADULT GENERAL MEDICAL EXAMINATION: ICD-10-CM

## 2025-05-21 DIAGNOSIS — R74.8 ELEVATED ALKALINE PHOSPHATASE LEVEL: ICD-10-CM

## 2025-05-21 DIAGNOSIS — E61.1 IRON DEFICIENCY: ICD-10-CM

## 2025-05-21 LAB
ALBUMIN SERPL-MCNC: 4.7 G/DL (ref 3.2–4.8)
ALBUMIN/GLOB SERPL: 1.6 {RATIO} (ref 1–2)
ALP LIVER SERPL-CCNC: 121 U/L (ref 37–98)
ALT SERPL-CCNC: 29 U/L (ref 10–49)
ANION GAP SERPL CALC-SCNC: 9 MMOL/L (ref 0–18)
AST SERPL-CCNC: 25 U/L (ref ?–34)
BASOPHILS # BLD AUTO: 0.07 X10(3) UL (ref 0–0.2)
BASOPHILS NFR BLD AUTO: 1 %
BILIRUB SERPL-MCNC: 0.5 MG/DL (ref 0.3–1.2)
BUN BLD-MCNC: 9 MG/DL (ref 9–23)
BUN/CREAT SERPL: 11.3 (ref 10–20)
CALCIUM BLD-MCNC: 9.4 MG/DL (ref 8.7–10.4)
CHLORIDE SERPL-SCNC: 103 MMOL/L (ref 98–112)
CHOLEST SERPL-MCNC: 223 MG/DL (ref ?–200)
CO2 SERPL-SCNC: 28 MMOL/L (ref 21–32)
CREAT BLD-MCNC: 0.8 MG/DL (ref 0.55–1.02)
DEPRECATED HBV CORE AB SER IA-ACNC: 36 NG/ML (ref 50–306)
DEPRECATED RDW RBC AUTO: 38 FL (ref 35.1–46.3)
EGFRCR SERPLBLD CKD-EPI 2021: 95 ML/MIN/1.73M2 (ref 60–?)
EOSINOPHIL # BLD AUTO: 0.08 X10(3) UL (ref 0–0.7)
EOSINOPHIL NFR BLD AUTO: 1.1 %
ERYTHROCYTE [DISTWIDTH] IN BLOOD BY AUTOMATED COUNT: 12 % (ref 11–15)
EST. AVERAGE GLUCOSE BLD GHB EST-MCNC: 103 MG/DL (ref 68–126)
FASTING PATIENT LIPID ANSWER: YES
FASTING STATUS PATIENT QL REPORTED: YES
GLOBULIN PLAS-MCNC: 2.9 G/DL (ref 2–3.5)
GLUCOSE BLD-MCNC: 85 MG/DL (ref 70–99)
HBA1C MFR BLD: 5.2 % (ref ?–5.7)
HCT VFR BLD AUTO: 44 % (ref 35–48)
HDLC SERPL-MCNC: 50 MG/DL (ref 40–59)
HGB BLD-MCNC: 15 G/DL (ref 12–16)
IMM GRANULOCYTES # BLD AUTO: 0.01 X10(3) UL (ref 0–1)
IMM GRANULOCYTES NFR BLD: 0.1 %
IRON SATN MFR SERPL: 30 % (ref 15–50)
IRON SERPL-MCNC: 98 UG/DL (ref 50–170)
LDLC SERPL CALC-MCNC: 140 MG/DL (ref ?–100)
LYMPHOCYTES # BLD AUTO: 3.42 X10(3) UL (ref 1–4)
LYMPHOCYTES NFR BLD AUTO: 47.8 %
MCH RBC QN AUTO: 29.5 PG (ref 26–34)
MCHC RBC AUTO-ENTMCNC: 34.1 G/DL (ref 31–37)
MCV RBC AUTO: 86.4 FL (ref 80–100)
MONOCYTES # BLD AUTO: 0.55 X10(3) UL (ref 0.1–1)
MONOCYTES NFR BLD AUTO: 7.7 %
NEUTROPHILS # BLD AUTO: 3.02 X10 (3) UL (ref 1.5–7.7)
NEUTROPHILS # BLD AUTO: 3.02 X10(3) UL (ref 1.5–7.7)
NEUTROPHILS NFR BLD AUTO: 42.3 %
NONHDLC SERPL-MCNC: 173 MG/DL (ref ?–130)
OSMOLALITY SERPL CALC.SUM OF ELEC: 288 MOSM/KG (ref 275–295)
PLATELET # BLD AUTO: 386 10(3)UL (ref 150–450)
POTASSIUM SERPL-SCNC: 4.3 MMOL/L (ref 3.5–5.1)
PROT SERPL-MCNC: 7.6 G/DL (ref 5.7–8.2)
RBC # BLD AUTO: 5.09 X10(6)UL (ref 3.8–5.3)
SODIUM SERPL-SCNC: 140 MMOL/L (ref 136–145)
TOTAL IRON BINDING CAPACITY: 324 UG/DL (ref 250–425)
TRANSFERRIN SERPL-MCNC: 265 MG/DL (ref 250–380)
TRIGL SERPL-MCNC: 187 MG/DL (ref 30–149)
TSI SER-ACNC: 2.66 UIU/ML (ref 0.55–4.78)
VLDLC SERPL CALC-MCNC: 35 MG/DL (ref 0–30)
WBC # BLD AUTO: 7.2 X10(3) UL (ref 4–11)

## 2025-05-21 PROCEDURE — 85025 COMPLETE CBC W/AUTO DIFF WBC: CPT

## 2025-05-21 PROCEDURE — 80061 LIPID PANEL: CPT

## 2025-05-21 PROCEDURE — 83036 HEMOGLOBIN GLYCOSYLATED A1C: CPT

## 2025-05-21 PROCEDURE — 84466 ASSAY OF TRANSFERRIN: CPT

## 2025-05-21 PROCEDURE — 83540 ASSAY OF IRON: CPT

## 2025-05-21 PROCEDURE — 84443 ASSAY THYROID STIM HORMONE: CPT

## 2025-05-21 PROCEDURE — 80053 COMPREHEN METABOLIC PANEL: CPT

## 2025-05-21 PROCEDURE — 36415 COLL VENOUS BLD VENIPUNCTURE: CPT

## 2025-05-21 PROCEDURE — 82728 ASSAY OF FERRITIN: CPT

## 2025-05-21 NOTE — TELEPHONE ENCOUNTER
Her hemoglobin is 15, typically donating blood will reduce this by about 1 point (so 14, which is still normal). She will need to continuously replace her iron storage though, and if she struggles to tolerate that she may be better off not donating.     She can try ferrous fumarate OTC-sometimes this is better tolerated by the gut.

## 2025-06-17 ENCOUNTER — PATIENT MESSAGE (OUTPATIENT)
Facility: CLINIC | Age: 40
End: 2025-06-17

## 2025-06-17 ENCOUNTER — TELEPHONE (OUTPATIENT)
Facility: CLINIC | Age: 40
End: 2025-06-17

## 2025-06-17 NOTE — TELEPHONE ENCOUNTER
Called and spoke with patient. Scheduled vein procedure for 08/29/2025 and 09/12/2025. She was receptive. Let her know that she will need a post procedure ultrasound scans after each procedure, she understood. Let her know that instructions will be uploaded via [employer]. She was receptive.

## 2025-06-17 NOTE — TELEPHONE ENCOUNTER
Patient needs EVLT of bilateral GSV vein procedures.  Case initiated through [facility].  Vein Procedures Approved.  Auth #: [id number]  Valid: 07/14/2025-12/17/2025

## 2025-06-18 ENCOUNTER — OFFICE VISIT (OUTPATIENT)
Dept: ENDOCRINOLOGY CLINIC | Facility: CLINIC | Age: 40
End: 2025-06-18
Payer: COMMERCIAL

## 2025-06-18 ENCOUNTER — LAB ENCOUNTER (OUTPATIENT)
Dept: LAB | Facility: HOSPITAL | Age: 40
End: 2025-06-18
Attending: INTERNAL MEDICINE
Payer: COMMERCIAL

## 2025-06-18 VITALS
HEIGHT: 64 IN | DIASTOLIC BLOOD PRESSURE: 84 MMHG | SYSTOLIC BLOOD PRESSURE: 120 MMHG | BODY MASS INDEX: 32.44 KG/M2 | WEIGHT: 190 LBS | HEART RATE: 65 BPM

## 2025-06-18 DIAGNOSIS — R74.8 ELEVATED ALKALINE PHOSPHATASE LEVEL: ICD-10-CM

## 2025-06-18 DIAGNOSIS — N92.6 IRREGULAR MENSES: Primary | ICD-10-CM

## 2025-06-18 DIAGNOSIS — N92.6 IRREGULAR MENSES: ICD-10-CM

## 2025-06-18 LAB
ALBUMIN SERPL-MCNC: 4.9 G/DL (ref 3.2–4.8)
ALBUMIN/GLOB SERPL: 1.6 {RATIO} (ref 1–2)
ALP LIVER SERPL-CCNC: 119 U/L (ref 37–98)
ALT SERPL-CCNC: 22 U/L (ref 10–49)
ANION GAP SERPL CALC-SCNC: 9 MMOL/L (ref 0–18)
AST SERPL-CCNC: 22 U/L (ref ?–34)
BILIRUB SERPL-MCNC: 0.5 MG/DL (ref 0.3–1.2)
BUN BLD-MCNC: 8 MG/DL (ref 9–23)
BUN/CREAT SERPL: 10 (ref 10–20)
CALCIUM BLD-MCNC: 9.3 MG/DL (ref 8.7–10.4)
CHLORIDE SERPL-SCNC: 103 MMOL/L (ref 98–112)
CO2 SERPL-SCNC: 27 MMOL/L (ref 21–32)
CREAT BLD-MCNC: 0.8 MG/DL (ref 0.55–1.02)
EGFRCR SERPLBLD CKD-EPI 2021: 95 ML/MIN/1.73M2 (ref 60–?)
ESTRADIOL SERPL-MCNC: 106.3 PG/ML
FASTING STATUS PATIENT QL REPORTED: YES
FSH SERPL-ACNC: 7.5 MIU/ML
GGT SERPL-CCNC: 33 U/L (ref ?–38)
GLOBULIN PLAS-MCNC: 3.1 G/DL (ref 2–3.5)
GLUCOSE BLD-MCNC: 86 MG/DL (ref 70–99)
MAGNESIUM SERPL-MCNC: 2.1 MG/DL (ref 1.6–2.6)
OSMOLALITY SERPL CALC.SUM OF ELEC: 286 MOSM/KG (ref 275–295)
PHOSPHATE SERPL-MCNC: 2.5 MG/DL (ref 2.4–5.1)
POTASSIUM SERPL-SCNC: 3.6 MMOL/L (ref 3.5–5.1)
PROLACTIN SERPL-MCNC: 7.2 NG/ML
PROT SERPL-MCNC: 8 G/DL (ref 5.7–8.2)
PTH-INTACT SERPL-MCNC: 51.4 PG/ML (ref 18.5–88)
SODIUM SERPL-SCNC: 139 MMOL/L (ref 136–145)
VIT D+METAB SERPL-MCNC: 82.4 NG/ML (ref 30–100)

## 2025-06-18 PROCEDURE — 82670 ASSAY OF TOTAL ESTRADIOL: CPT

## 2025-06-18 PROCEDURE — 83001 ASSAY OF GONADOTROPIN (FSH): CPT

## 2025-06-18 PROCEDURE — 82330 ASSAY OF CALCIUM: CPT

## 2025-06-18 PROCEDURE — 36415 COLL VENOUS BLD VENIPUNCTURE: CPT

## 2025-06-18 PROCEDURE — 84100 ASSAY OF PHOSPHORUS: CPT

## 2025-06-18 PROCEDURE — 82306 VITAMIN D 25 HYDROXY: CPT

## 2025-06-18 PROCEDURE — 84080 ASSAY ALKALINE PHOSPHATASES: CPT

## 2025-06-18 PROCEDURE — 83970 ASSAY OF PARATHORMONE: CPT

## 2025-06-18 PROCEDURE — 83735 ASSAY OF MAGNESIUM: CPT

## 2025-06-18 PROCEDURE — 99204 OFFICE O/P NEW MOD 45 MIN: CPT | Performed by: INTERNAL MEDICINE

## 2025-06-18 PROCEDURE — 84146 ASSAY OF PROLACTIN: CPT

## 2025-06-18 PROCEDURE — 80053 COMPREHEN METABOLIC PANEL: CPT

## 2025-06-18 PROCEDURE — 82977 ASSAY OF GGT: CPT

## 2025-06-18 NOTE — H&P
New Patient Evaluation - History and Physical    CONSULT - Reason for Visit:  Elevated Alkaline Phosphatase.  Requesting Provider: SUSANA Seo    CHIEF COMPLAINT:    Chief Complaint   Patient presents with    Consult     Elevated alkaline phosphate, last labs 5/2025        HISTORY OF PRESENT ILLNESS:   Rosita Shirley is a 39 year old female who presents with elevated alkaline phosphatase. She has had this since at least 2021.     Since the birth of her daughter, about 4.5 years ago, she started to have irregular cycles. 1 week of heavy bleeding, followed by 1 week of spotting. She stated that she has to use 5-6 pads on top of using tampons.     She was recommended various types of hormonal treatment, but these affected her mental health.     Last fall, she had started a vitamin called 'Enlyte',     She does get hemorrhoids often. And has iron-deficiency.    Vitamin D- 3,000 international     PAST MEDICAL HISTORY:   Past Medical History[1]  Anxiety  Back Pain  History of Vitamin D deficiency    PAST SURGICAL HISTORY:   Past Surgical History[2]    SOCIAL HISTORY:    Short Social Hx on File[3]    FAMILY HISTORY:   Family History[4]    CURRENT MEDICATIONS:    Current Medications[5]    ALLERGIES:  Allergies[6]      ASSESSMENTS:     REVIEW OF SYSTEMS:  Constitutional: Negative for: weight change, fever, fatigue, cold/heat intolerance  Eyes: Negative for:  Visual changes, proptosis, blurring  ENT: Negative for:  dysphagia, neck swelling, dysphonia  Respiratory: Negative for:  dyspnea, cough  Cardiovascular: Negative for:  chest pain, palpitations, orthopnea  GI: Negative for:  abdominal pain, nausea, vomiting, diarrhea, constipation, bleeding  Neurology: Negative for: headache, numbness, weakness  Genito-Urinary: Negative for: dysuria, frequency  Psychiatric: Negative for:  depression, anxiety  Hematology/Lymphatics: Negative for: bruising, lower extremity edema  Endocrine: Negative for: polyuria,  polydypsia  Skin: Negative for: rash, blister, cellulitis,      PHYSICAL EXAM:   Vitals:    06/18/25 1134   BP: 120/84   Pulse: 65   Weight: 190 lb (86.2 kg)   Height: 5' 4\" (1.626 m)     BMI: Body mass index is 32.61 kg/m².     CONSTITUTIONAL:  awake, alert, cooperative, no apparent distress, and appears stated age  PSYCH: normal affect  EYES:  No proptosis, no ptosis, conjunctiva normal  SKIN:  no bruising or bleeding, no rashes and no lesions  EXTREMITIES: no edema      DATA:   Reviewed    ASSESSMENT AND PLAN: This is a 39 year-old woman here for evaluation and management of elevated alkaline phosphatase. I would like to perform a thorough evaluation of this, including vitamin D level, and possible GI causes. She also has irregular menstrual cycles and so I would like to evaluate her for kg-menopause.     I will follow up with her once test results are back.    Prior to this encounter, I spent over 20 minutes with preparing for the visit, reviewing documents from other specialties as well as from PCP, and going over test results and imaging studies. During the face to face encounter, I spent an additional 30 minutes which were determined for history-taking, physical examination, and orientation regarding our services. Greater than 50% of the time was spent in counseling, anticipatory guidance, and coordination of care. Patient concerns were answered to the best of my knowledge.       6/17/2025  Yessenia Robbins MD               [1]   Past Medical History:   Acid reflux    Breast disorder    Hypoglycemia    Iron deficiency    Migraines    Acid Reflux    Post partum depression    Postpartum anxiety (HCC)    Visual impairment   [2] No past surgical history on file.  [3]   Social History  Socioeconomic History    Marital status:    Tobacco Use    Smoking status: Never    Smokeless tobacco: Never   Vaping Use    Vaping status: Never Used   Substance and Sexual Activity    Alcohol use: Yes     Comment:  rarely    Drug use: No   Other Topics Concern    Caffeine Concern Yes     Comment: soda, tea - 2-3 cups per day    Exercise No    Grew up on a farm No    History of tanning Yes    Outdoor occupation No    Breast feeding No    Reaction to local anesthetic No    Pt has a pacemaker No    Pt has a defibrillator No   Social History Narrative    The patient does not use an assistive device..      The patient does live in a home with stairs.        3 children (5 yo, 5yo and 17 mo - 6/2021)    Work - FT, insurance     Social Drivers of Health     Food Insecurity: No Food Insecurity (3/13/2025)    NCSS - Food Insecurity     Worried About Running Out of Food in the Last Year: No     Ran Out of Food in the Last Year: No   Transportation Needs: No Transportation Needs (3/13/2025)    NCSS - Transportation     Lack of Transportation: No   Housing Stability: Not At Risk (3/13/2025)    NCSS - Housing/Utilities     Has Housing: Yes     Worried About Losing Housing: No     Unable to Get Utilities: No   [4]   Family History  Problem Relation Age of Onset    Uterine Cancer Mother     Lipids Mother         hyperlipidemia    Hypertension Mother     Heart Disease Mother     Cancer Mother         skin, lung, possibly uterine    Lipids Father         hyperlipidema    Hypertension Father     Cancer Father         Stomach, skin    Allergies Sister     Stroke Paternal Grandmother         CVA (stroke)    Other (Other) Paternal Grandmother         NO DX IN NG, JUST 'OTHER'    Alcohol and Other Disorders Associated Paternal Grandfather         alcoholism    Colon Cancer Paternal Grandfather     Cancer Paternal Grandfather         Colon    Breast Cancer Paternal Aunt 55    Asthma Other         cousin    Ovarian Cancer Neg     Pancreatic Cancer Neg     Prostate Cancer Neg    [5]   Current Outpatient Medications   Medication Sig Dispense Refill    terbinafine 250 MG Oral Tab Take 1 tablet (250 mg total) by mouth daily. 7 tablet 0    ibuprofen  200 MG Oral Tab Take 1 tablet (200 mg total) by mouth every 6 (six) hours as needed for Pain.      terbinafine 250 MG Oral Tab Take 1 tablet (250 mg total) by mouth daily. 7 tablet 1    fluticasone propionate 50 MCG/ACT Nasal Suspension by Each Nare route daily.      NON FORMULARY       naproxen 500 MG Oral Tab Take 1 tablet (500 mg total) by mouth 2 (two) times daily with meals. 28 tablet 0    omeprazole 20 MG Oral Capsule Delayed Release Take 1 capsule (20 mg total) by mouth every morning before breakfast.      BLACK ELDERBERRY OR Take by mouth.      Dexmethylphenidate HCl ER 30 MG Oral Capsule SR 24 Hr Take 1 capsule (30 mg total) by mouth daily.      Cetirizine HCl (ZYRTEC ALLERGY) 10 MG Oral Cap       Nutritional Supplements (VITAMIN D BOOSTER OR) As Directed.     [6]   Allergies  Allergen Reactions    Adhesive Tape      Other reaction(s): ADHESIVE BANDAGE    Latex      Other reaction(s): Rash    Ritalin [Methylphenidate] Coughing      done

## 2025-06-19 LAB — LC CALCIUM, IONIZED: 5.1 MG/DL

## 2025-06-23 LAB — ALKALINE PHOSPHATASE BONE SPECIFIC: 17.3 UG/L

## 2025-06-26 ENCOUNTER — CLINICAL ABSTRACT (OUTPATIENT)
Age: 40
End: 2025-06-26

## 2025-06-28 ENCOUNTER — LAB ENCOUNTER (OUTPATIENT)
Dept: LAB | Facility: HOSPITAL | Age: 40
End: 2025-06-28
Attending: INTERNAL MEDICINE
Payer: COMMERCIAL

## 2025-06-28 DIAGNOSIS — R74.8 ELEVATED ALKALINE PHOSPHATASE LEVEL: ICD-10-CM

## 2025-06-28 PROCEDURE — 84080 ASSAY ALKALINE PHOSPHATASES: CPT

## 2025-06-28 PROCEDURE — 84075 ASSAY ALKALINE PHOSPHATASE: CPT

## 2025-06-28 PROCEDURE — 36415 COLL VENOUS BLD VENIPUNCTURE: CPT

## 2025-07-01 LAB
ALK PHOSPHATASE: 121 IU/L
BONE FRAC: 36 %
INTESTINAL FRAC: 16 %
LIVER FRAC: 47 %

## 2025-07-03 ENCOUNTER — OFFICE VISIT (OUTPATIENT)
Age: 40
End: 2025-07-03

## 2025-07-03 VITALS
WEIGHT: 178 LBS | HEART RATE: 76 BPM | SYSTOLIC BLOOD PRESSURE: 120 MMHG | TEMPERATURE: 97.6 F | OXYGEN SATURATION: 100 % | RESPIRATION RATE: 16 BRPM | DIASTOLIC BLOOD PRESSURE: 68 MMHG

## 2025-07-03 DIAGNOSIS — H10.9 CONJUNCTIVITIS OF BOTH EYES, UNSPECIFIED CONJUNCTIVITIS TYPE: Primary | ICD-10-CM

## 2025-07-03 RX ORDER — CETIRIZINE HYDROCHLORIDE 10 MG/1
10 TABLET ORAL DAILY
COMMUNITY

## 2025-07-03 RX ORDER — FERROUS SULFATE 325(65) MG
325 TABLET ORAL
COMMUNITY

## 2025-07-03 RX ORDER — ACETAMINOPHEN 160 MG
2000 TABLET,DISINTEGRATING ORAL DAILY
COMMUNITY

## 2025-07-03 RX ORDER — OMEPRAZOLE 20 MG/1
20 CAPSULE, DELAYED RELEASE ORAL DAILY
COMMUNITY

## 2025-07-03 RX ORDER — M-VIT,TX,IRON,MINS/CALC/FOLIC 27MG-0.4MG
1 TABLET ORAL DAILY
COMMUNITY

## 2025-07-03 RX ORDER — FLUTICASONE PROPIONATE 50 MCG
1 SPRAY, SUSPENSION (ML) NASAL DAILY PRN
COMMUNITY

## 2025-07-03 RX ORDER — TOBRAMYCIN 3 MG/ML
1 SOLUTION/ DROPS OPHTHALMIC EVERY 4 HOURS
Qty: 5 ML | Refills: 0 | Status: SHIPPED | OUTPATIENT
Start: 2025-07-03 | End: 2025-07-13

## 2025-07-03 ASSESSMENT — ENCOUNTER SYMPTOMS
STRIDOR: 0
COUGH: 0
SINUS PRESSURE: 0
CHEST TIGHTNESS: 0
EYE REDNESS: 1
WHEEZING: 0
ABDOMINAL PAIN: 0
EYE DISCHARGE: 1
SORE THROAT: 0
SHORTNESS OF BREATH: 0
TROUBLE SWALLOWING: 0
COLOR CHANGE: 0
ABDOMINAL DISTENTION: 0
EYE PAIN: 0

## 2025-07-03 NOTE — PATIENT INSTRUCTIONS
Eye drops sent  Keep eyes cleaned out  Do not touch dropper to eye  Frequent hand washing  Follow-up with PCP or eye doctor as needed  Go to ER for worrisome symptoms    Patient verbalized understanding and agrees to plan.

## 2025-07-03 NOTE — PROGRESS NOTES
[facility], [facility] ([place])  [facility]  [address]  [address]  [place] [address]  Dept: [phone]    Eleanor Lomeli is a 39 y.o. female who presents today for her medical conditions/complaints as noted below.  Eleanor Lomeli is complaining of Eye Problem (Pt c/o bilateral eye redness.)        HPI:     Eye Problem   Associated symptoms include an eye discharge and eye redness. Pertinent negatives include no fever or weakness.       Eleanor presents to the office complaining of bilateral eye redness and swelling.  Symptoms started 2 days ago.  Denies injury and vision changes.    History reviewed. No pertinent past medical history.    No past surgical history on file.    No family history on file.    Social History     Tobacco Use    Smoking status: Not on file    Smokeless tobacco: Not on file   Substance Use Topics    Alcohol use: Not on file        Current Outpatient Medications   Medication Sig Dispense Refill    Dexmethylphenidate HCl (FOCALIN PO) Take by mouth      vitamin D (VITAMIN D3) 50 MCG (2000 UT) CAPS capsule Take 1 capsule by mouth daily      ferrous sulfate (IRON 325) 325 (65 Fe) MG tablet Take 1 tablet by mouth daily (with breakfast)      omeprazole (PRILOSEC) 20 MG delayed release capsule Take 1 capsule by mouth daily      cetirizine (ZYRTEC) 10 MG tablet Take 1 tablet by mouth daily      fluticasone (FLONASE) 50 MCG/ACT nasal spray 1 spray by Each Nostril route daily as needed for Rhinitis      Multiple Vitamins-Minerals (THERAPEUTIC MULTIVITAMIN-MINERALS) tablet Take 1 tablet by mouth daily      tobramycin (TOBREX) 0.3 % ophthalmic solution Place 1 drop into the right eye every 4 hours for 10 days 5 mL 0     No current facility-administered medications for this visit.       Allergies   Allergen Reactions    Latex Rash    Adhesive Tape Rash       Health Maintenance   Topic Date Due    Depression Screen  Never done    HIV screen  Never done    Hepatitis C screen  Never done

## 2025-08-22 ENCOUNTER — TELEPHONE (OUTPATIENT)
Facility: CLINIC | Age: 40
End: 2025-08-22

## 2025-08-25 ENCOUNTER — CLINICAL DOCUMENTATION (OUTPATIENT)
Dept: OBGYN | Age: 40
End: 2025-08-25

## 2025-08-29 ENCOUNTER — TELEPHONE (OUTPATIENT)
Facility: CLINIC | Age: 40
End: 2025-08-29

## 2025-08-29 ENCOUNTER — OFFICE VISIT (OUTPATIENT)
Facility: CLINIC | Age: 40
End: 2025-08-29

## 2025-08-29 DIAGNOSIS — I83.813 VARICOSE VEINS OF BILATERAL LOWER EXTREMITIES WITH PAIN: Primary | ICD-10-CM

## (undated) NOTE — LETTER
Medical Grade Compression Hose     Patient: Rosita Shirley     YOB: 1985         Diagnosis: Varicose Veins with Pain- Bilateral: I83.813  Compression: 20-30mmHg- Moderate  Style: Thigh-High        Amount: X 2  HCPS: - Thigh High          Physician Signature: _____________________  Date: 3/20/2025     SUSANA Hernandez

## (undated) NOTE — MR AVS SNAPSHOT
Nanda DELATORRE/ Sunday Soto. [place]- [address] [facility] [address]  [phone]               Thank you for choosing us for your health care visit with [id number] [facility].   We are glad to serve you and happy to provide you with this Normal Orders This Visit     [facility] ALT REFERRAL LACTATION FOLLOW UP [[id number] CUSTOM]  Order #:  [id number]     Assoc Dx:  Disorder of lactation, postpartum condition or complication [M67.47]          MyChart     Visit MyChart  You can access your [email or web address]

## (undated) NOTE — MR AVS SNAPSHOT
[facility]  [address], [address] [facility]  [phone]               Thank you for choosing us for your health care visit with Kofi Buckley.  Patricia Zheng MD.  We are glad to serve you and happy to provide you with this summary Visit [employer]  You can access your MyChart to more actively manage your health care and view more details from this visit by going to [email or web address].   If you've recently had a stay at the Hospital you can access your discharge instructions i track your progress   You don’t need to join a gym. Home exercises work great.  Put more priority on exercise in your life                    Visit [facility] online at  [email or web address]

## (undated) NOTE — IP AVS SNAPSHOT
[address]  [address] [facility] Kellyne Mention ~ [phone]                Discharge Summary   1/15/2017    Lala Gaspar           Admission Information        Provider Department    1/15/2017 Isreal Tyler MD [facility] · Headache that does not go away  · Visual changes (seeing spots, blurred vision or partial vision loss)  · Feeling nauseated or vomiting  · Stomach pain/heartburn  · Swelling in your hands, feet or face  · Sudden weight gain  · Shortness of breath  · \"Ju Metabolic Screening Resolved   Bottle Feeding Resolved   Breast Feeding Resolved   SIDS Prevention Resolved   CCHD Screening Resolved   Late  Infant ([place]) Resolved   Car Seat Safety ([place] Only) Resolved         Postpartum Education  Resolve and ask to get set up for an insurance coverage that is in-network with [employer].         MyChart     Visit [employer]  You can access your [employer] to more actively manage your health care and view more details from this visit by going to https:/

## (undated) NOTE — IP AVS SNAPSHOT
[address]  [address] [facility] [phone]                Discharge Summary   1/12/2017    Violette Goldberg           Admission Information        Provider Department    1/12/2017 Jerome Palacios MD [facility]- Specialty:  OBSTETRICS & GYNECOLOGY    Why:  scheduled prenatal appointments    Contact information:    [address] [place] [id number] [phone]        Immunization History as of 1/12/2017  Never Reviewed    INFLUENZA 11/21/20

## (undated) NOTE — MR AVS SNAPSHOT
[facility] DIVISION  [address], [address] [facility]  [phone]               Thank you for choosing us for your health care visit with Troy Millard MD.  We are glad to serve you and happy to provide you with this summary o Visit [email or web address]  You can access your MyChart to more actively manage your health care and view more details from this visit by going to [email or web address].   If you've recently had a stay at the Hospital you can access your discharge instructions i